# Patient Record
Sex: MALE | Race: WHITE | NOT HISPANIC OR LATINO | Employment: OTHER | URBAN - METROPOLITAN AREA
[De-identification: names, ages, dates, MRNs, and addresses within clinical notes are randomized per-mention and may not be internally consistent; named-entity substitution may affect disease eponyms.]

---

## 2018-06-07 ENCOUNTER — OFFICE VISIT (OUTPATIENT)
Dept: RHEUMATOLOGY | Facility: PHYSICIAN GROUP | Age: 73
End: 2018-06-07
Payer: COMMERCIAL

## 2018-06-07 VITALS
OXYGEN SATURATION: 95 % | WEIGHT: 192 LBS | DIASTOLIC BLOOD PRESSURE: 80 MMHG | BODY MASS INDEX: 26.78 KG/M2 | SYSTOLIC BLOOD PRESSURE: 110 MMHG | RESPIRATION RATE: 16 BRPM | HEART RATE: 60 BPM | TEMPERATURE: 98.3 F

## 2018-06-07 DIAGNOSIS — M35.00 SICCA SYNDROME (HCC): ICD-10-CM

## 2018-06-07 DIAGNOSIS — M35.00 SJOGREN'S SYNDROME, WITH UNSPECIFIED ORGAN INVOLVEMENT (HCC): ICD-10-CM

## 2018-06-07 PROCEDURE — 99204 OFFICE O/P NEW MOD 45 MIN: CPT | Performed by: INTERNAL MEDICINE

## 2018-06-07 RX ORDER — CHOLECALCIFEROL (VITAMIN D3) 25 MCG
TABLET,CHEWABLE ORAL
COMMUNITY

## 2018-06-07 RX ORDER — LOSARTAN POTASSIUM 25 MG/1
50 TABLET ORAL DAILY
COMMUNITY
End: 2020-01-27 | Stop reason: SDUPTHER

## 2018-06-07 RX ORDER — LOTEPREDNOL ETABONATE 5 MG/G
GEL OPHTHALMIC
COMMUNITY
Start: 2018-05-07 | End: 2020-05-29

## 2018-06-07 RX ORDER — ZOLPIDEM TARTRATE 10 MG/1
10 TABLET ORAL NIGHTLY PRN
COMMUNITY
End: 2020-01-27 | Stop reason: SDUPTHER

## 2018-06-07 RX ORDER — BEPOTASTINE BESILATE 15 MG/ML
1 SOLUTION/ DROPS OPHTHALMIC PRN
COMMUNITY

## 2018-06-07 RX ORDER — TEMAZEPAM 7.5 MG/1
7.5 CAPSULE ORAL
COMMUNITY
Start: 2017-05-19 | End: 2018-07-24

## 2018-06-07 RX ORDER — TADALAFIL 10 MG/1
10 TABLET ORAL EVERY MORNING
COMMUNITY
End: 2020-09-04 | Stop reason: SDUPTHER

## 2018-06-07 RX ORDER — ASPIRIN AND DIPYRIDAMOLE 25; 200 MG/1; MG/1
1 CAPSULE, EXTENDED RELEASE ORAL 2 TIMES DAILY
COMMUNITY
End: 2020-01-27

## 2018-06-07 RX ORDER — LIOTHYRONINE SODIUM 5 UG/1
5 TABLET ORAL DAILY
COMMUNITY
End: 2020-01-27 | Stop reason: SDUPTHER

## 2018-06-07 RX ORDER — EPINEPHRINE 0.3 MG/.3ML
0.3 INJECTION SUBCUTANEOUS
COMMUNITY
Start: 2017-05-16 | End: 2020-01-27

## 2018-06-07 RX ORDER — LEVOTHYROXINE SODIUM 0.12 MG/1
125 TABLET ORAL
COMMUNITY
End: 2020-01-27 | Stop reason: SDUPTHER

## 2018-06-07 ASSESSMENT — ENCOUNTER SYMPTOMS
FEVER: 0
WEIGHT LOSS: 0
CHILLS: 0
EYE PAIN: 0

## 2018-06-07 ASSESSMENT — PAIN SCALES - GENERAL: PAINLEVEL: NO PAIN
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
## 2018-06-08 ASSESSMENT — ENCOUNTER SYMPTOMS
NAUSEA: 1
VOMITING: 1
BLURRED VISION: 0
HEARTBURN: 0
BRUISES/BLEEDS EASILY: 0
CONSTIPATION: 1

## 2018-07-21 NOTE — PROGRESS NOTES
ESTABLISHED PATIENT    Mr. Martin Briceño is a 73 y.o. male here for follow-up for positive SSB with sicca symptoms, Sjogrens    Sjogren  Notes most significant symptom is fatigue  He denies any arthralgias, rash  Has ordered new OTC treatments for dry mouth      Positive anti TPO  Has a history of hypothyroidism      RHEUM HISTORY  Mr. Martin Briceño presents with a diagnosis of Sjogren.  He reports onsets of fatigue for 30-40 years.  He has had a history of anemia.  He started to notice dry eyes and dry mouth after cataract surgery in 11/2016.  At that point his symptoms brought to his attention the sicca symptoms.  He had attributed the dry mouth to medications.  He denies any parotid tenderness or rashes.  He does notes some new onset minor finger pain.     He does report chronic constipation. No fevers, chills or night sweats.        Labs dated July 13, 2018 [facility]  Vitamin D total 25 hydroxy was 42  DNA double-stranded was negative at 2  Thyroglobulin antibody was negative  Thyroid peroxidase antibody was elevated at 210  CCP antibody was 19 considered negative  CBC showed a white blood cell count of 6.4 and hemoglobin of 13.4 and a platelet count of 128  CRP was normal at 0.7 mg/L  ESR was normal at 14  SPEP showed a normal electrophoretic pattern  FLAVIO was positive with a pattern of speckled and a titer of 1: 40  Angiotensin-converting enzyme was normal at 24 U/L  G6PD was normal at 12.2 (7-20 0.5)  Calcium was 8.9 creatinine was 0.285 AST was 29 ALT 28 alkaline phosphatase was 57.      Past medical history of kidney stones, GERD triggered but food.hypothyroidism started in 2003     Family History: paternal second cousin had rheumatoid arthritis, father had osteoarthritis,  No history of lymphoma.  Father's sister had liver cancer - although she was a heavy alcohol use.       Social History: stopped alcohol in early April, originally from [place], previous [place] vet., previous  smoker       Current Outpatient Prescriptions on File Prior to Visit   Medication Sig Dispense Refill   • levothyroxine (SYNTHROID) 125 MCG Tab Take 125 mcg by mouth Every morning on an empty stomach.     • losartan (COZAAR) 25 MG Tab Take  by mouth every day.     • zolpidem (AMBIEN) 10 MG Tab Take 10 mg by mouth at bedtime as needed for Sleep.     • tadalafil (CIALIS) 10 MG tablet Take 10 mg by mouth as needed for Erectile Dysfunction.     • liothyronine (CYTOMEL) 5 MCG Tab Take 5 mcg by mouth every day.     • Bepotastine Besilate (BEPREVE) 1.5 % Solution by Ophthalmic route.     • aspirin-dipyridamole (AGGRENOX)  MG CAPSULE SR 12 HR Take 1 Cap by mouth 2 times a day.     • Cyanocobalamin (B-12) 1000 MCG Cap Take  by mouth.     • cholecalciferol (D-3-5) 5000 UNIT Cap Take 5,000 Units by mouth every day.     • Coenzyme Q10 (COQ10) 400 MG Cap Take  by mouth.     • EPINEPHrine (EPIPEN) 0.3 MG/0.3ML Solution Auto-injector solution for injection 0.3 mg by Intramuscular route.     • LIPITOR 20 MG PO TABS 30 mg. 1 po daily     • PRILOSEC 20 MG PO CPDR 1 po daily     • CENTRUM PO TABS 1 po daily     • LOTEMAX 0.5 % Gel        No current facility-administered medications on file prior to visit.        REVIEW OF SYSTEMS  Review of Systems   Constitutional: Positive for malaise/fatigue. Negative for chills and fever.   Eyes: Negative for pain.        Notes dry mouth.  Relates dry eyes to cataracts   Respiratory: Negative for shortness of breath.    Cardiovascular: Negative for chest pain.   Musculoskeletal: Negative for joint pain.   Skin: Negative for rash.   Neurological: Negative for dizziness and headaches.       PHYSICAL EXAM  Ambulatory Vitals   Ambulatory Vitals  Encounter Vitals  Temperature: 36.8 °C (98.3 °F)  Temp Source: Temporal  Blood Pressure : 122/70  BP Location: Right, Upper Arm  Patient BP Position: Sitting  Pulse: (!) 50  Respiration: 16  Pulse Oximetry: 94 %  Weight: 84.5 kg (186 lb 4.8 oz)  Weight  Source: Stand Up Scale      Physical Exam   Constitutional: He is oriented to person, place, and time and well-developed, well-nourished, and in no distress. No distress.   HENT:   Head: Normocephalic and atraumatic.   Right Ear: External ear normal.   Left Ear: External ear normal.   Eyes: Conjunctivae are normal. Right eye exhibits no discharge. Left eye exhibits no discharge. No scleral icterus.   Cardiovascular: Normal heart sounds.    No murmur heard.  Pulmonary/Chest: No stridor. No respiratory distress.   Musculoskeletal: He exhibits no edema.   No overt synovitis of the MCP, PIP, DIP   Lymphadenopathy:     He has no cervical adenopathy.   Neurological: He is alert and oriented to person, place, and time. Gait normal.   Skin: Skin is warm and dry. He is not diaphoretic.   Psychiatric: Mood, memory, affect and judgment normal.           DIAGNOSTICS:    Labs    Noted above      Imaging          ASSESSMENT AND PLAN:  Mr. Martin Briceño presents for follow-up of SSB positive sjogren    SJogren; SSB and positive FLAVIO and sicca symptoms  Notes increase fatigue  Discussed starting plaquenil  I will get labs done in 8 weeks  Discussed that medications can take up to 6 months to take effect    I reviewed the side effects of hydroxychloroquine including but not limited to headache, retinal toxicity, skin rash.  I emphasized the importance of baseline and annual follow-up with evaluation with ophthalmology.  Patient reports he will have eyes check    Positive anti TPO antibodies  On levothyroxine  Refer to endocrinology          1. Anti-TPO antibodies present  REFERRAL TO ENDOCRINOLOGY    CBC WITH DIFFERENTIAL    COMP METABOLIC PANEL    HEP B SURFACE ANTIGEN    HEP C VIRUS ANTIBODY    HEP B CORE AB TOTAL   2. Sjogren's syndrome, with unspecified organ involvement (HCC)  CBC WITH DIFFERENTIAL    COMP METABOLIC PANEL    HEP B SURFACE ANTIGEN    HEP C VIRUS ANTIBODY    HEP B CORE AB TOTAL   3. Positive FLAVIO (antinuclear  antibody)  CBC WITH DIFFERENTIAL    COMP METABOLIC PANEL    HEP B SURFACE ANTIGEN    HEP C VIRUS ANTIBODY    HEP B CORE AB TOTAL     Return in about 8 weeks (around 9/18/2018).      I have spent greater than 50% of this 30 minute visit in counseling/coordination of care with the patient regarding review of labs and the benefits and risks of plaquenil.      he agreed and verbalized he agreement and understanding with the current plan. I answered all questions and concerns he has at this time and advised our patient to call at any time in there interim with questions or concerns in regards he care.     Thank you for allowing me to participate in the care of this patient, I will continue to follow closely.      Please note that this dictation was created using voice recognition software. I have made every reasonable attempt to correct obvious errors, but I expect that there are errors of grammar and possibly content that I did not discover before finalizing the note.

## 2018-07-24 ENCOUNTER — OFFICE VISIT (OUTPATIENT)
Dept: RHEUMATOLOGY | Facility: PHYSICIAN GROUP | Age: 73
End: 2018-07-24
Payer: COMMERCIAL

## 2018-07-24 VITALS
OXYGEN SATURATION: 94 % | DIASTOLIC BLOOD PRESSURE: 70 MMHG | HEART RATE: 50 BPM | WEIGHT: 186.3 LBS | RESPIRATION RATE: 16 BRPM | SYSTOLIC BLOOD PRESSURE: 122 MMHG | TEMPERATURE: 98.3 F

## 2018-07-24 DIAGNOSIS — M35.00 SJOGREN'S SYNDROME, WITH UNSPECIFIED ORGAN INVOLVEMENT (HCC): ICD-10-CM

## 2018-07-24 DIAGNOSIS — R76.8 ANTI-TPO ANTIBODIES PRESENT: ICD-10-CM

## 2018-07-24 DIAGNOSIS — R76.8 POSITIVE ANA (ANTINUCLEAR ANTIBODY): ICD-10-CM

## 2018-07-24 PROCEDURE — 99214 OFFICE O/P EST MOD 30 MIN: CPT | Performed by: INTERNAL MEDICINE

## 2018-07-24 RX ORDER — HYDROXYCHLOROQUINE SULFATE 200 MG/1
TABLET, FILM COATED ORAL
Qty: 60 TAB | Refills: 1 | Status: SHIPPED | OUTPATIENT
Start: 2018-07-24 | End: 2018-10-02 | Stop reason: SDUPTHER

## 2018-07-24 ASSESSMENT — ENCOUNTER SYMPTOMS
DIZZINESS: 0
FEVER: 0
HEADACHES: 0
SHORTNESS OF BREATH: 0
CHILLS: 0
EYE PAIN: 0

## 2018-07-24 NOTE — LETTER
[facility]   [address], [address]  [place] [address]  Phone: [phone]  Fax: [phone]              Encounter Date: 7/24/2018    Dear Dr. Hackett,    It was a pleasure seeing your patient, Martin Briceño, on 7/24/2018. Diagnoses of Anti-TPO antibodies present, Sjogren's syndrome, with unspecified organ involvement (HCC), and Positive FLAVIO (antinuclear antibody) were pertinent to this visit.     Please find attached progress note which includes the history I obtained from Mr. Briceño, my physical examination findings, my impression and recommendations.      Once again, it was a pleasure participating in your patient's care.  Please feel free to contact me if you have any questions or if I can be of any further assistance to your patients.      Sincerely,    Karely Dunbar M.D.  Electronically Signed          PROGRESS NOTE:  ESTABLISHED PATIENT    Mr. Martin Briceño is a 73 y.o. male here for follow-up for positive SSB with sicca symptoms, Sjogrens    Sjogren  Notes most significant symptom is fatigue  He denies any arthralgias, rash  Has ordered new OTC treatments for dry mouth      Positive anti TPO  Has a history of hypothyroidism      RHEUM HISTORY  Mr. Martin Briceño presents with a diagnosis of Sjogren.  He reports onsets of fatigue for 30-40 years.  He has had a history of anemia.  He started to notice dry eyes and dry mouth after cataract surgery in 11/2016.  At that point his symptoms brought to his attention the sicca symptoms.  He had attributed the dry mouth to medications.  He denies any parotid tenderness or rashes.  He does notes some new onset minor finger pain.     He does report chronic constipation. No fevers, chills or night sweats.        Labs dated July 13, 2018 from [facility]  Vitamin D total 25 hydroxy was 42  DNA double-stranded was negative at 2  Thyroglobulin antibody was negative  Thyroid peroxidase antibody was elevated at 210  CCP antibody  was 19 considered negative  CBC showed a white blood cell count of 6.4 and hemoglobin of 13.4 and a platelet count of 128  CRP was normal at 0.7 mg/L  ESR was normal at 14  SPEP showed a normal electrophoretic pattern  FLAVIO was positive with a pattern of speckled and a titer of 1: 40  Angiotensin-converting enzyme was normal at 24 U/L  G6PD was normal at 12.2 (7-20 0.5)  Calcium was 8.9 creatinine was 0.285 AST was 29 ALT 28 alkaline phosphatase was 57.      Past medical history of kidney stones, GERD triggered but food.hypothyroidism started in 2003     Family History: paternal second cousin had rheumatoid arthritis, father had osteoarthritis,  No history of lymphoma.  Father's sister had liver cancer - although she was a heavy alcohol use.       Social History: stopped alcohol in early April, originally from [place], previous [place] vet., previous smoker       Current Outpatient Prescriptions on File Prior to Visit   Medication Sig Dispense Refill   • levothyroxine (SYNTHROID) 125 MCG Tab Take 125 mcg by mouth Every morning on an empty stomach.     • losartan (COZAAR) 25 MG Tab Take  by mouth every day.     • zolpidem (AMBIEN) 10 MG Tab Take 10 mg by mouth at bedtime as needed for Sleep.     • tadalafil (CIALIS) 10 MG tablet Take 10 mg by mouth as needed for Erectile Dysfunction.     • liothyronine (CYTOMEL) 5 MCG Tab Take 5 mcg by mouth every day.     • Bepotastine Besilate (BEPREVE) 1.5 % Solution by Ophthalmic route.     • aspirin-dipyridamole (AGGRENOX)  MG CAPSULE SR 12 HR Take 1 Cap by mouth 2 times a day.     • Cyanocobalamin (B-12) 1000 MCG Cap Take  by mouth.     • cholecalciferol (D-3-5) 5000 UNIT Cap Take 5,000 Units by mouth every day.     • Coenzyme Q10 (COQ10) 400 MG Cap Take  by mouth.     • EPINEPHrine (EPIPEN) 0.3 MG/0.3ML Solution Auto-injector solution for injection 0.3 mg by Intramuscular route.     • LIPITOR 20 MG PO TABS 30 mg. 1 po daily     • PRILOSEC 20 MG PO CPDR 1 po daily     •  CENTRUM PO TABS 1 po daily     • LOTEMAX 0.5 % Gel        No current facility-administered medications on file prior to visit.        REVIEW OF SYSTEMS  Review of Systems   Constitutional: Positive for malaise/fatigue. Negative for chills and fever.   Eyes: Negative for pain.        Notes dry mouth.  Relates dry eyes to cataracts   Respiratory: Negative for shortness of breath.    Cardiovascular: Negative for chest pain.   Musculoskeletal: Negative for joint pain.   Skin: Negative for rash.   Neurological: Negative for dizziness and headaches.       PHYSICAL EXAM  Ambulatory Vitals   Ambulatory Vitals  Encounter Vitals  Temperature: 36.8 °C (98.3 °F)  Temp Source: Temporal  Blood Pressure : 122/70  BP Location: Right, Upper Arm  Patient BP Position: Sitting  Pulse: (!) 50  Respiration: 16  Pulse Oximetry: 94 %  Weight: 84.5 kg (186 lb 4.8 oz)  Weight Source: Stand Up Scale      Physical Exam   Constitutional: He is oriented to person, place, and time and well-developed, well-nourished, and in no distress. No distress.   HENT:   Head: Normocephalic and atraumatic.   Right Ear: External ear normal.   Left Ear: External ear normal.   Eyes: Conjunctivae are normal. Right eye exhibits no discharge. Left eye exhibits no discharge. No scleral icterus.   Cardiovascular: Normal heart sounds.    No murmur heard.  Pulmonary/Chest: No stridor. No respiratory distress.   Musculoskeletal: He exhibits no edema.   No overt synovitis of the MCP, PIP, DIP   Lymphadenopathy:     He has no cervical adenopathy.   Neurological: He is alert and oriented to person, place, and time. Gait normal.   Skin: Skin is warm and dry. He is not diaphoretic.   Psychiatric: Mood, memory, affect and judgment normal.           DIAGNOSTICS:    Labs    Noted above      Imaging          ASSESSMENT AND PLAN:  Mr. Martin Briceño presents for follow-up of SSB positive sjogren    SJogren; SSB and positive FLAVIO and sicca symptoms  Notes increase  fatigue  Discussed starting plaquenil  I will get labs done in 8 weeks  Discussed that medications can take up to 6 months to take effect    I reviewed the side effects of hydroxychloroquine including but not limited to headache, retinal toxicity, skin rash.  I emphasized the importance of baseline and annual follow-up with evaluation with ophthalmology.  Patient reports he will have eyes check    Positive anti TPO antibodies  On levothyroxine  Refer to endocrinology          1. Anti-TPO antibodies present  REFERRAL TO ENDOCRINOLOGY    CBC WITH DIFFERENTIAL    COMP METABOLIC PANEL    HEP B SURFACE ANTIGEN    HEP C VIRUS ANTIBODY    HEP B CORE AB TOTAL   2. Sjogren's syndrome, with unspecified organ involvement (HCC)  CBC WITH DIFFERENTIAL    COMP METABOLIC PANEL    HEP B SURFACE ANTIGEN    HEP C VIRUS ANTIBODY    HEP B CORE AB TOTAL   3. Positive FLAVIO (antinuclear antibody)  CBC WITH DIFFERENTIAL    COMP METABOLIC PANEL    HEP B SURFACE ANTIGEN    HEP C VIRUS ANTIBODY    HEP B CORE AB TOTAL     Return in about 8 weeks (around 9/18/2018).      I have spent greater than 50% of this 30 minute visit in counseling/coordination of care with the patient regarding review of labs and the benefits and risks of plaquenil.      he agreed and verbalized he agreement and understanding with the current plan. I answered all questions and concerns he has at this time and advised our patient to call at any time in there interim with questions or concerns in regards he care.     Thank you for allowing me to participate in the care of this patient, I will continue to follow closely.      Please note that this dictation was created using voice recognition software. I have made every reasonable attempt to correct obvious errors, but I expect that there are errors of grammar and possibly content that I did not discover before finalizing the note.

## 2018-09-10 ENCOUNTER — TELEPHONE (OUTPATIENT)
Dept: RHEUMATOLOGY | Facility: PHYSICIAN GROUP | Age: 73
End: 2018-09-10

## 2018-09-10 DIAGNOSIS — D69.6 THROMBOCYTOPENIA (HCC): ICD-10-CM

## 2018-09-10 NOTE — TELEPHONE ENCOUNTER
No need to call Sudha Mckeon  We did find the results      Please call patient.  His platelet count and red blood cell count did decrease.  I would like to decrease his plaquenil dose to 200 mg and 400 mg alternating days and repeat the CBC at the last week of September.    CBC order placed.

## 2018-09-10 NOTE — TELEPHONE ENCOUNTER
Can you please call [facility]  [phone]    I am missing the hepatitis B labs (surface antigen and core antibody) and CBC that was ordered 7/24/2018      CMP showed a calcium level of 8.7 albumin level of 4.1 and a creatinine of 0.96 and AST 26 ALT of 20 and the hepatitis C antibody was nonreactive.

## 2018-09-20 ENCOUNTER — OFFICE VISIT (OUTPATIENT)
Dept: RHEUMATOLOGY | Facility: MEDICAL CENTER | Age: 73
End: 2018-09-20
Payer: COMMERCIAL

## 2018-09-20 VITALS
SYSTOLIC BLOOD PRESSURE: 100 MMHG | OXYGEN SATURATION: 96 % | HEART RATE: 61 BPM | WEIGHT: 182 LBS | RESPIRATION RATE: 16 BRPM | DIASTOLIC BLOOD PRESSURE: 60 MMHG

## 2018-09-20 DIAGNOSIS — Z79.899 ENCOUNTER FOR MONITORING OF HYDROXYCHLOROQUINE THERAPY: ICD-10-CM

## 2018-09-20 DIAGNOSIS — M35.00 SJOGREN'S SYNDROME, WITH UNSPECIFIED ORGAN INVOLVEMENT (HCC): ICD-10-CM

## 2018-09-20 DIAGNOSIS — Z51.81 ENCOUNTER FOR MONITORING OF HYDROXYCHLOROQUINE THERAPY: ICD-10-CM

## 2018-09-20 PROCEDURE — 99213 OFFICE O/P EST LOW 20 MIN: CPT | Performed by: INTERNAL MEDICINE

## 2018-09-20 ASSESSMENT — ENCOUNTER SYMPTOMS
DIZZINESS: 0
EYE PAIN: 0
FEVER: 0
HEADACHES: 0
CHILLS: 0
SHORTNESS OF BREATH: 0

## 2018-09-20 NOTE — PROGRESS NOTES
ESTABLISHED PATIENT    Mr. Martin Briceño is a 73 y.o. male here for follow-up for positive SSB with sicca symptoms, Sjogrens    Sjogren  We had started 7/24/2018 plaquenil 400 mg daily and the first three weeks he had severe fatigue.  He also notes some improvement in his fatigue compared to earlier last month.  Since the reduction he is feeling improved.  He reports no arthralgias. THe last four days his fatigue is improved.    Current Dose  Plaquenil 200 mg and 400 mg q other day    Positive anti TPO  Has a history of hypothyroidism      RHEUM HISTORY  Mr. Martin Briceño presents with a diagnosis of Sjogren.  He reports onsets of fatigue for 30-40 years.  He has had a history of anemia.  He started to notice dry eyes and dry mouth after cataract surgery in 11/2016.  At that point his symptoms brought to his attention the sicca symptoms.  He had attributed the dry mouth to medications.  He denies any parotid tenderness or rashes.  He does notes some new onset minor finger pain.     He does report chronic constipation. No fevers, chills or night sweats.        Labs dated July 13, 2018 from [facility]  Vitamin D total 25 hydroxy was 42  DNA double-stranded was negative at 2  Thyroglobulin antibody was negative  Thyroid peroxidase antibody was elevated at 210  CCP antibody was 19 considered negative  CBC showed a white blood cell count of 6.4 and hemoglobin of 13.4 and a platelet count of 128  CRP was normal at 0.7 mg/L  ESR was normal at 14  SPEP showed a normal electrophoretic pattern  FLAVIO was positive with a pattern of speckled and a titer of 1: 40  Angiotensin-converting enzyme was normal at 24 U/L  G6PD was normal at 12.2 (7-20 0.5)  Calcium was 8.9 creatinine was 0.285 AST was 29 ALT 28 alkaline phosphatase was 57.      Labs dated 9/3/2018  AST = 26  ALT = 20  HCV nonreactive  HBV sAg nonreactive  HBV core antibody nonreactive  CBC: 4.5/13.4/113      Past medical history of kidney stones, GERD  triggered by food, hypothyroidism started in 2003, history of parathyroidectomy     Family History: paternal second cousin had rheumatoid arthritis, father had osteoarthritis,  No history of lymphoma.  Father's sister had liver cancer - although she was a heavy alcohol use.       Social History: stopped alcohol in early April, originally from [place], previous [place] vet., previous smoker       Current Outpatient Prescriptions on File Prior to Visit   Medication Sig Dispense Refill   • hydroxychloroquine (PLAQUENIL) 200 MG Tab 200 mg q day x 7 days then increase to 200 mg BID po labs in 8 weeks 60 Tab 1   • levothyroxine (SYNTHROID) 125 MCG Tab Take 125 mcg by mouth Every morning on an empty stomach.     • losartan (COZAAR) 25 MG Tab Take  by mouth every day.     • zolpidem (AMBIEN) 10 MG Tab Take 10 mg by mouth at bedtime as needed for Sleep.     • tadalafil (CIALIS) 10 MG tablet Take 10 mg by mouth as needed for Erectile Dysfunction.     • liothyronine (CYTOMEL) 5 MCG Tab Take 5 mcg by mouth every day.     • Bepotastine Besilate (BEPREVE) 1.5 % Solution by Ophthalmic route.     • aspirin-dipyridamole (AGGRENOX)  MG CAPSULE SR 12 HR Take 1 Cap by mouth 2 times a day.     • Cyanocobalamin (B-12) 1000 MCG Cap Take  by mouth.     • cholecalciferol (D-3-5) 5000 UNIT Cap Take 5,000 Units by mouth every day.     • Coenzyme Q10 (COQ10) 400 MG Cap Take  by mouth.     • LOTEMAX 0.5 % Gel      • EPINEPHrine (EPIPEN) 0.3 MG/0.3ML Solution Auto-injector solution for injection 0.3 mg by Intramuscular route.     • LIPITOR 20 MG PO TABS 30 mg. 1 po daily     • PRILOSEC 20 MG PO CPDR 1 po daily     • CENTRUM PO TABS 1 po daily       No current facility-administered medications on file prior to visit.        REVIEW OF SYSTEMS  Review of Systems   Constitutional: Positive for malaise/fatigue. Negative for chills and fever.   Eyes: Negative for pain.        Notes dry mouth.  Relates dry eyes to cataracts   Respiratory:  Negative for shortness of breath.    Cardiovascular: Negative for chest pain.   Gastrointestinal: Positive for nausea.   Musculoskeletal: Negative for joint pain.   Skin: Negative for rash.   Neurological: Negative for dizziness and headaches.       PHYSICAL EXAM  Ambulatory Vitals  Vitals:    09/20/18 1300   BP: 100/60   BP Location: Left arm   Patient Position: Sitting   BP Cuff Size: Adult   Pulse: 61   Resp: 16   SpO2: 96%   Weight: 82.6 kg (182 lb)         Physical Exam   Constitutional: He is oriented to person, place, and time and well-developed, well-nourished, and in no distress. No distress.   HENT:   Head: Normocephalic and atraumatic.   Right Ear: External ear normal.   Left Ear: External ear normal.   Eyes: Conjunctivae are normal. Right eye exhibits no discharge. Left eye exhibits no discharge. No scleral icterus.   Cardiovascular: Normal heart sounds.    No murmur heard.  Pulmonary/Chest: No stridor. No respiratory distress.   Musculoskeletal: He exhibits no edema.   No overt synovitis of the MCP, PIP, DIP   Lymphadenopathy:     He has no cervical adenopathy.   Neurological: He is alert and oriented to person, place, and time. Gait normal.   Skin: Skin is warm and dry. He is not diaphoretic.   Psychiatric: Mood, memory, affect and judgment normal.           DIAGNOSTICS:    Labs    Noted above      Imaging          ASSESSMENT AND PLAN:  Mr. Martin Briceño presents for follow-up of SSB positive sjogren    SJogren; SSB and positive FLAVIO and sicca symptoms  Started plaquenil at 400 mg daily and now 200mg and 400 mg q other day with improvement in symptoms  Repeat labs showed slight drop in WBC and so will repeat end for September  Discussed that medications can take up to 6 months to take effect    I reviewed the side effects of hydroxychloroquine including but not limited to headache, retinal toxicity, skin rash.  I emphasized the importance of baseline and annual follow-up with evaluation with  ophthalmology.  Patient reports he will have eyes check    Positive anti TPO antibodies  On levothyroxine  Has an upcoming appointment with endocrinology          1. Sjogren's syndrome, with unspecified organ involvement (HCC)     2. Encounter for monitoring of hydroxychloroquine therapy       Return in about 4 months (around 1/20/2019).        he agreed and verbalized he agreement and understanding with the current plan. I answered all questions and concerns he has at this time and advised our patient to call at any time in there interim with questions or concerns in regards he care.     Thank you for allowing me to participate in the care of this patient, I will continue to follow closely.      Please note that this dictation was created using voice recognition software. I have made every reasonable attempt to correct obvious errors, but I expect that there are errors of grammar and possibly content that I did not discover before finalizing the note.

## 2018-09-20 NOTE — LETTER
[facility]   [address] [place] [address], [address]  [place] [address]  Phone: [phone]  Fax: [phone]              Encounter Date: 9/20/2018    Dear Dr. Diehl,    It was a pleasure seeing your patient, Martin Briceño, on 9/20/2018. Diagnoses of Sjogren's syndrome, with unspecified organ involvement (HCC) and Encounter for monitoring of hydroxychloroquine therapy were pertinent to this visit.     Please find attached progress note which includes the history I obtained from Mr. Briceño, my physical examination findings, my impression and recommendations.      Once again, it was a pleasure participating in your patient's care.  Please feel free to contact me if you have any questions or if I can be of any further assistance to your patients.      Sincerely,    Karely Dunbar M.D.  Electronically Signed          PROGRESS NOTE:  ESTABLISHED PATIENT    Mr. Martin Brcieño is a 73 y.o. male here for follow-up for positive SSB with sicca symptoms, Sjogrens    Sjogren  We had started 7/24/2018 plaquenil 400 mg daily and the first three weeks he had severe fatigue.  He also notes some improvement in his fatigue compared to earlier last month.  Since the reduction he is feeling improved.  He reports no arthralgias. THe last four days his fatigue is improved.    Current Dose  Plaquenil 200 mg and 400 mg q other day    Positive anti TPO  Has a history of hypothyroidism      RHEUM HISTORY  Mr. Martin Briceño presents with a diagnosis of Sjogren.  He reports onsets of fatigue for 30-40 years.  He has had a history of anemia.  He started to notice dry eyes and dry mouth after cataract surgery in 11/2016.  At that point his symptoms brought to his attention the sicca symptoms.  He had attributed the dry mouth to medications.  He denies any parotid tenderness or rashes.  He does notes some new onset minor finger pain.     He does report chronic constipation. No fevers, chills or night sweats.        Labs dated  July 13, 2018 from [facility]  Vitamin D total 25 hydroxy was 42  DNA double-stranded was negative at 2  Thyroglobulin antibody was negative  Thyroid peroxidase antibody was elevated at 210  CCP antibody was 19 considered negative  CBC showed a white blood cell count of 6.4 and hemoglobin of 13.4 and a platelet count of 128  CRP was normal at 0.7 mg/L  ESR was normal at 14  SPEP showed a normal electrophoretic pattern  FLAVIO was positive with a pattern of speckled and a titer of 1: 40  Angiotensin-converting enzyme was normal at 24 U/L  G6PD was normal at 12.2 (7-20 0.5)  Calcium was 8.9 creatinine was 0.285 AST was 29 ALT 28 alkaline phosphatase was 57.      Labs dated 9/3/2018  AST = 26  ALT = 20  HCV nonreactive  HBV sAg nonreactive  HBV core antibody nonreactive  CBC: 4.5/13.4/113      Past medical history of kidney stones, GERD triggered by food, hypothyroidism started in 2003, history of parathyroidectomy     Family History: paternal second cousin had rheumatoid arthritis, father had osteoarthritis,  No history of lymphoma.  Father's sister had liver cancer - although she was a heavy alcohol use.       Social History: stopped alcohol in early April, originally from [place], previous [place] vet., previous smoker       Current Outpatient Prescriptions on File Prior to Visit   Medication Sig Dispense Refill   • hydroxychloroquine (PLAQUENIL) 200 MG Tab 200 mg q day x 7 days then increase to 200 mg BID po labs in 8 weeks 60 Tab 1   • levothyroxine (SYNTHROID) 125 MCG Tab Take 125 mcg by mouth Every morning on an empty stomach.     • losartan (COZAAR) 25 MG Tab Take  by mouth every day.     • zolpidem (AMBIEN) 10 MG Tab Take 10 mg by mouth at bedtime as needed for Sleep.     • tadalafil (CIALIS) 10 MG tablet Take 10 mg by mouth as needed for Erectile Dysfunction.     • liothyronine (CYTOMEL) 5 MCG Tab Take 5 mcg by mouth every day.     • Bepotastine Besilate (BEPREVE) 1.5 % Solution by Ophthalmic  route.     • aspirin-dipyridamole (AGGRENOX)  MG CAPSULE SR 12 HR Take 1 Cap by mouth 2 times a day.     • Cyanocobalamin (B-12) 1000 MCG Cap Take  by mouth.     • cholecalciferol (D-3-5) 5000 UNIT Cap Take 5,000 Units by mouth every day.     • Coenzyme Q10 (COQ10) 400 MG Cap Take  by mouth.     • LOTEMAX 0.5 % Gel      • EPINEPHrine (EPIPEN) 0.3 MG/0.3ML Solution Auto-injector solution for injection 0.3 mg by Intramuscular route.     • LIPITOR 20 MG PO TABS 30 mg. 1 po daily     • PRILOSEC 20 MG PO CPDR 1 po daily     • CENTRUM PO TABS 1 po daily       No current facility-administered medications on file prior to visit.        REVIEW OF SYSTEMS  Review of Systems   Constitutional: Positive for malaise/fatigue. Negative for chills and fever.   Eyes: Negative for pain.        Notes dry mouth.  Relates dry eyes to cataracts   Respiratory: Negative for shortness of breath.    Cardiovascular: Negative for chest pain.   Gastrointestinal: Positive for nausea.   Musculoskeletal: Negative for joint pain.   Skin: Negative for rash.   Neurological: Negative for dizziness and headaches.       PHYSICAL EXAM  Ambulatory Vitals  Vitals:    09/20/18 1300   BP: 100/60   BP Location: Left arm   Patient Position: Sitting   BP Cuff Size: Adult   Pulse: 61   Resp: 16   SpO2: 96%   Weight: 82.6 kg (182 lb)         Physical Exam   Constitutional: He is oriented to person, place, and time and well-developed, well-nourished, and in no distress. No distress.   HENT:   Head: Normocephalic and atraumatic.   Right Ear: External ear normal.   Left Ear: External ear normal.   Eyes: Conjunctivae are normal. Right eye exhibits no discharge. Left eye exhibits no discharge. No scleral icterus.   Cardiovascular: Normal heart sounds.    No murmur heard.  Pulmonary/Chest: No stridor. No respiratory distress.   Musculoskeletal: He exhibits no edema.   No overt synovitis of the MCP, PIP, DIP   Lymphadenopathy:     He has no cervical adenopathy.      Neurological: He is alert and oriented to person, place, and time. Gait normal.   Skin: Skin is warm and dry. He is not diaphoretic.   Psychiatric: Mood, memory, affect and judgment normal.           DIAGNOSTICS:    Labs    Noted above      Imaging          ASSESSMENT AND PLAN:  Mr. Martin Briceño presents for follow-up of SSB positive sjogren    SJogren; SSB and positive FLAVIO and sicca symptoms  Started plaquenil at 400 mg daily and now 200mg and 400 mg q other day with improvement in symptoms  Repeat labs showed slight drop in WBC and so will repeat end for September  Discussed that medications can take up to 6 months to take effect    I reviewed the side effects of hydroxychloroquine including but not limited to headache, retinal toxicity, skin rash.  I emphasized the importance of baseline and annual follow-up with evaluation with ophthalmology.  Patient reports he will have eyes check    Positive anti TPO antibodies  On levothyroxine  Has an upcoming appointment with endocrinology          1. Sjogren's syndrome, with unspecified organ involvement (HCC)     2. Encounter for monitoring of hydroxychloroquine therapy       Return in about 4 months (around 1/20/2019).        he agreed and verbalized he agreement and understanding with the current plan. I answered all questions and concerns he has at this time and advised our patient to call at any time in there interim with questions or concerns in regards he care.     Thank you for allowing me to participate in the care of this patient, I will continue to follow closely.      Please note that this dictation was created using voice recognition software. I have made every reasonable attempt to correct obvious errors, but I expect that there are errors of grammar and possibly content that I did not discover before finalizing the note.

## 2018-09-21 PROBLEM — M35.00 SJOGREN'S SYNDROME (HCC): Status: ACTIVE | Noted: 2018-09-21

## 2018-09-21 ASSESSMENT — ENCOUNTER SYMPTOMS: NAUSEA: 1

## 2018-09-28 ENCOUNTER — PATIENT MESSAGE (OUTPATIENT)
Dept: RHEUMATOLOGY | Facility: MEDICAL CENTER | Age: 73
End: 2018-09-28

## 2018-09-28 DIAGNOSIS — Z79.899 LONG-TERM USE OF HYDROXYCHLOROQUINE: ICD-10-CM

## 2018-10-29 ENCOUNTER — TELEPHONE (OUTPATIENT)
Dept: RHEUMATOLOGY | Facility: MEDICAL CENTER | Age: 73
End: 2018-10-29

## 2018-10-30 NOTE — TELEPHONE ENCOUNTER
Since I have never seen this patient, I would need to see the patient before considering a referral, may be he can ask his PCP    Would also help if he knew which endocrinologist he wanted to go to

## 2018-11-05 ENCOUNTER — OFFICE VISIT (OUTPATIENT)
Dept: RHEUMATOLOGY | Facility: MEDICAL CENTER | Age: 73
End: 2018-11-05
Payer: COMMERCIAL

## 2018-11-05 VITALS
SYSTOLIC BLOOD PRESSURE: 110 MMHG | RESPIRATION RATE: 14 BRPM | WEIGHT: 177 LBS | HEART RATE: 60 BPM | DIASTOLIC BLOOD PRESSURE: 60 MMHG | OXYGEN SATURATION: 97 % | TEMPERATURE: 97.9 F

## 2018-11-05 DIAGNOSIS — E06.9 THYROIDITIS: ICD-10-CM

## 2018-11-05 DIAGNOSIS — E21.3 HYPERPARATHYROIDISM (HCC): ICD-10-CM

## 2018-11-05 DIAGNOSIS — E03.9 HYPOTHYROIDISM, UNSPECIFIED TYPE: ICD-10-CM

## 2018-11-05 DIAGNOSIS — R53.83 FATIGUE, UNSPECIFIED TYPE: ICD-10-CM

## 2018-11-05 DIAGNOSIS — M35.00 SJOGREN'S SYNDROME, WITH UNSPECIFIED ORGAN INVOLVEMENT (HCC): ICD-10-CM

## 2018-11-05 PROCEDURE — 99214 OFFICE O/P EST MOD 30 MIN: CPT | Performed by: INTERNAL MEDICINE

## 2018-11-05 RX ORDER — FLUTICASONE PROPIONATE 50 MCG
1 SPRAY, SUSPENSION (ML) NASAL PRN
COMMUNITY

## 2018-11-05 NOTE — LETTER
[facility]   [address] [place] [address], [address]  [place] [address]  Phone: [phone]  Fax: [phone]              Encounter Date: 11/5/2018    Dear Dr. Rucker ref. provider found,    It was a pleasure seeing your patient, Martin Briceño, on 11/5/2018. Diagnoses of Sjogren's syndrome, with unspecified organ involvement (HCC), Hypothyroidism, unspecified type, Hyperparathyroidism (HCC), Thyroiditis, and Fatigue, unspecified type were pertinent to this visit.     Please find attached progress note which includes the history I obtained from Mr. Briceño, my physical examination findings, my impression and recommendations.      Once again, it was a pleasure participating in your patient's care.  Please feel free to contact me if you have any questions or if I can be of any further assistance to your patients.      Sincerely,    Yuki Rodríguez M.D.  Electronically Signed          PROGRESS NOTE:  No notes on file

## 2018-11-05 NOTE — PROGRESS NOTES
Chief Complaint- joint pain    Subjective:   Martin Briceño is a 73 y.o. male here today for follow up of rheumatological issues    This is a follow-up visit for this patient who is seen in this clinic for Sjogren's syndrome previously followed by Dr. Dunbar now a brand-new patient to me, patient here stating that he was told he needs a referral to endocrinology for what per notes sounds like a positive anti-TPO antibody.  However patient has already been diagnosed as hypothyroidism and is currently being treated with levothyroxine.  Patient does have a diagnosis of Sjogren's but there is been no minor salivary gland lip biopsy done, patient is symptomatic with dry eyes dry mouth however patient has good dentition, patient's main issue is fatigue and chronic constipation.    Additional comorbidities include a history of hyperparathyroidism status post surgical resection of 3 out of the 4 parathyroids and is followed by his PCP.  Patient has a long history of thrombocytopenia followed by his hematologist.    G6PD 12.2 adequate per Dr Pemberton notes 9/20/2018  HBsAg/HbcAb neg 9/2018 [facility]  HCV neg 9/2018 [facility]  FLAVIO neg 8/2017 [facility]  RF neg 8/2017  [facility]  SSA neg 8/2017  [facility]  SSB 36.6 8/2017  [facility]  SP-1 neg 8/2017  [facility]    Current medicines (including changes today)  Current Outpatient Prescriptions   Medication Sig Dispense Refill   • fluticasone (FLONASE) 50 MCG/ACT nasal spray Spray 1 Spray in nose every day.     • hydroxychloroquine (PLAQUENIL) 200 MG Tab Alternate 200mg and 400 mg daily 45 Tab 5   • levothyroxine (SYNTHROID) 125 MCG Tab Take 125 mcg by mouth Every morning on an empty stomach.     • losartan (COZAAR) 25 MG Tab Take 50 mg by mouth every day.     • zolpidem (AMBIEN) 10 MG Tab Take 10 mg by mouth at bedtime as needed for Sleep.     • tadalafil (CIALIS) 10 MG tablet Take 10 mg by mouth as  needed for Erectile Dysfunction.     • liothyronine (CYTOMEL) 5 MCG Tab Take 5 mcg by mouth every day.     • Bepotastine Besilate (BEPREVE) 1.5 % Solution by Ophthalmic route.     • aspirin-dipyridamole (AGGRENOX)  MG CAPSULE SR 12 HR Take 1 Cap by mouth 2 times a day.     • Cyanocobalamin (B-12) 1000 MCG Cap Take  by mouth.     • cholecalciferol (D-3-5) 5000 UNIT Cap Take 5,000 Units by mouth every day.     • Coenzyme Q10 (COQ10) 400 MG Cap Take  by mouth.     • LOTEMAX 0.5 % Gel      • EPINEPHrine (EPIPEN) 0.3 MG/0.3ML Solution Auto-injector solution for injection 0.3 mg by Intramuscular route.     • LIPITOR 20 MG PO TABS 30 mg. 1 po daily     • PRILOSEC 20 MG PO CPDR 1 po daily     • CENTRUM PO TABS 1 po daily       No current facility-administered medications for this visit.      He  has a past medical history of Heart Burn; Indigestion; and Unspecified Disorder of Thyroid.    ROS   Other than what is mentioned in HPI or physical exam, there is no history of headaches, double vision or blurred vision. No temporal tenderness or jaw claudication. No history of cataracts or glaucoma. No trouble swallowing difficulties or sore throats.  No chest complaints including chest pain, cough or sputum production. No GI complaints including nausea, vomiting, change in bowel habits, or past peptic ulcer disease. No history of blood in the stools. No urinary complaints including dysuria or frequency. No history of alopecia, photosensitivity, oral ulcerations, Raynaud's phenomena.       Objective:     Blood pressure 110/60, pulse 60, temperature 36.6 °C (97.9 °F), temperature source Temporal, resp. rate 14, weight 80.3 kg (177 lb), SpO2 97 %. There is no height or weight on file to calculate BMI.   Physical Exam:    Constitutional: Alert and oriented X3, patient is talkative with good eye contact.Skin: Warm, dry, good turgor, no rashes in visible areas, no psoriatic lesions, no petechial lesions, no malar rashes  .Eye:  Equal, round and reactive, conjunctiva clear, lids normal EOM intact, eyes are noninjected ENMT: Lips without lesions, good dentition, no oropharyngeal ulcers, mildly dry buccal mucosa but without ulceration, pinna without deformity, no angular chelitis neck: Trachea midline, no masses, no thyromegaly.Lymph:  No cervical lymphadenopathy, no axillary lymphadenopathy, no supraclavicular lymphadenopathyRespiratory: Unlabored respiratory effort, lungs clear to auscultation, no wheezes, no ronchi.Cardiovascular: Normal S1, S2, no murmur, no edema.Abdomen: Soft, non-tender, no masses, no hepatosplenomegaly.Psych: Alert and oriented x3, normal affect and mood.Neuro: Cranial nerves 2-12 are grossly intact, no loss of sensation LEExt:no joint laxity noted in bilateral arms, no joint laxity noted in bilateral legs, do not see any deformities of the hands or the feet, elbows without flexion contractures no nodules no tophi, shoulders full range of motion      Assessment and Plan:     1. Sjogren's syndrome, with unspecified organ involvement (HCC)  Currently on Plaquenil 200 mg alternating daily versus twice daily which is difficult for patient to maintain, we opted to do a trial of Plaquenil 20 mg p.o. daily to see if this helps with his fatigue issue, patient states he thinks it is.  Of note G6PD levels are adequate  Patient needs ophthalmology evaluation every year patient has an ophthalmology evaluation pending January 2019  Patient will need CBC every 6 months, next CBC due about March 2019  - fluticasone (FLONASE) 50 MCG/ACT nasal spray; Spray 1 Spray in nose every day.  - US-SOFT TISSUES OF HEAD - NECK; Future    2. Hypothyroidism, unspecified type  Currently being treated with levothyroxine  Patient does have a positive anti-TPO, today we will check thyroid ultrasound to assure no thyroid nodules  - fluticasone (FLONASE) 50 MCG/ACT nasal spray; Spray 1 Spray in nose every day.  - US-SOFT TISSUES OF HEAD - NECK;  Future    3. Hyperparathyroidism (HCC)  Status post resection of 3 of the 4 parathyroid glands with calcium levels stable  - fluticasone (FLONASE) 50 MCG/ACT nasal spray; Spray 1 Spray in nose every day.  - US-SOFT TISSUES OF HEAD - NECK; Future    4. Thyroiditis  Positive anti-TPO check thyroid ultrasound for thyroid nodules  - fluticasone (FLONASE) 50 MCG/ACT nasal spray; Spray 1 Spray in nose every day.  - US-SOFT TISSUES OF HEAD - NECK; Future    5. Fatigue, unspecified type  Unclear etiology being worked up by his PCP    Followup: Return in about 2 months (around 1/5/2019). or sooner enedina Briceño was seen 30 minutes face-to-face of which more than 50% of the time was spent counseling the patient (excluding time for procedures)  regarding  rheumatological condition and care. Therapy was discussed in detail.    Please note that this dictation was created using voice recognition software. I have made every reasonable attempt to correct obvious errors, but I expect that there are errors of grammar and possibly content that I did not discover before finalizing the note.

## 2018-11-12 ENCOUNTER — TELEPHONE (OUTPATIENT)
Dept: RHEUMATOLOGY | Facility: MEDICAL CENTER | Age: 73
End: 2018-11-12

## 2018-11-13 NOTE — TELEPHONE ENCOUNTER
Please notify patient that we received the results of his thyroid ultrasound and it did not show any problem.  We will see patient at his upcoming appointment January 28, 2019

## 2019-01-28 ENCOUNTER — OFFICE VISIT (OUTPATIENT)
Dept: RHEUMATOLOGY | Facility: MEDICAL CENTER | Age: 74
End: 2019-01-28
Payer: COMMERCIAL

## 2019-01-28 VITALS
WEIGHT: 179 LBS | DIASTOLIC BLOOD PRESSURE: 60 MMHG | HEART RATE: 60 BPM | SYSTOLIC BLOOD PRESSURE: 98 MMHG | RESPIRATION RATE: 16 BRPM | OXYGEN SATURATION: 96 % | TEMPERATURE: 98.1 F

## 2019-01-28 DIAGNOSIS — R53.83 FATIGUE, UNSPECIFIED TYPE: ICD-10-CM

## 2019-01-28 DIAGNOSIS — E03.9 HYPOTHYROIDISM, UNSPECIFIED TYPE: ICD-10-CM

## 2019-01-28 DIAGNOSIS — E21.3 HYPERPARATHYROIDISM (HCC): ICD-10-CM

## 2019-01-28 DIAGNOSIS — M35.00 SJOGREN'S SYNDROME, WITH UNSPECIFIED ORGAN INVOLVEMENT (HCC): ICD-10-CM

## 2019-01-28 DIAGNOSIS — E06.9 THYROIDITIS: ICD-10-CM

## 2019-01-28 DIAGNOSIS — Z79.899 LONG-TERM USE OF PLAQUENIL: ICD-10-CM

## 2019-01-28 PROCEDURE — 99214 OFFICE O/P EST MOD 30 MIN: CPT | Performed by: INTERNAL MEDICINE

## 2019-01-28 RX ORDER — TEMAZEPAM 15 MG/1
15 CAPSULE ORAL NIGHTLY PRN
COMMUNITY
End: 2020-01-27 | Stop reason: SDUPTHER

## 2019-01-28 NOTE — LETTER
[facility]   [address] [place] [address], [address]  [place] [address]  Phone: [phone]  Fax: [phone]              Encounter Date: 1/28/2019    Dear Dr. Rucker ref. provider found,    It was a pleasure seeing your patient, Martin Briceño, on 1/28/2019. Diagnoses of Sjogren's syndrome, with unspecified organ involvement (HCC), Long-term use of Plaquenil, Hyperparathyroidism (HCC), Hypothyroidism, unspecified type, Thyroiditis, and Fatigue, unspecified type were pertinent to this visit.     Please find attached progress note which includes the history I obtained from Mr. Briceño, my physical examination findings, my impression and recommendations.      Once again, it was a pleasure participating in your patient's care.  Please feel free to contact me if you have any questions or if I can be of any further assistance to your patients.      Sincerely,    Yuki Rodríguez M.D.  Electronically Signed          PROGRESS NOTE:  No notes on file

## 2019-01-28 NOTE — PROGRESS NOTES
Chief Complaint- joint pain    Subjective:   Martin Briceño is a 73 y.o. male here today for follow up of rheumatological issues    This is a follow-up visit for this patient who was seen in this clinic for Sjogren's syndrome followed by Dr. Dunbar previously this is the second visit with me.  Patient is currently on Plaquenil at 200 mg p.o. daily predominately for fatigue issues.  Patient uses silo melts for his mouth nightly with helps keep his mouth moist, denies any dental problems does have an ophthalmology evaluation pending and denies any problems with the eyes.  Patient denies any new rashes denies any fevers of unknown etiology denies any parotid gland enlargement.  Patient denies any side effects from the medication, denies any unexplained weight loss, denies any fevers of unknown etiology, denies any GI upset, denies any rashes, denies any new joint swelling, denies recurrent infections.      Additional comorbidities include a history of hyperparathyroidism status post surgical resection of 3 out of the 4 parathyroids and is followed by his PCP.  Patient has a long history of thrombocytopenia followed by his hematologist.     G6PD 12.2 adequate per Dr Pemberton notes 9/20/2018  HBsAg/HbcAb neg 9/2018 [facility]  HCV neg 9/2018 [facility]  FLAVIO neg 8/2017 [facility]  RF neg 8/2017  [facility]  SSA neg 8/2017  [facility]  SSB 36.6 8/2017  [facility]  SP-1 neg 8/2017  [facility]     Current medicines (including changes today)  Current Outpatient Prescriptions   Medication Sig Dispense Refill   • temazepam (RESTORIL) 15 MG Cap Take 15 mg by mouth at bedtime as needed for Sleep.     • fluticasone (FLONASE) 50 MCG/ACT nasal spray Spray 1 Spray in nose every day.     • hydroxychloroquine (PLAQUENIL) 200 MG Tab Alternate 200mg and 400 mg daily 45 Tab 5   • levothyroxine (SYNTHROID) 125 MCG Tab Take 125 mcg by mouth Every morning on an  empty stomach.     • losartan (COZAAR) 25 MG Tab Take 50 mg by mouth every day.     • zolpidem (AMBIEN) 10 MG Tab Take 10 mg by mouth at bedtime as needed for Sleep.     • tadalafil (CIALIS) 10 MG tablet Take 10 mg by mouth as needed for Erectile Dysfunction.     • liothyronine (CYTOMEL) 5 MCG Tab Take 5 mcg by mouth every day.     • aspirin-dipyridamole (AGGRENOX)  MG CAPSULE SR 12 HR Take 1 Cap by mouth 2 times a day.     • Cyanocobalamin (B-12) 1000 MCG Cap Take  by mouth.     • Coenzyme Q10 (COQ10) 400 MG Cap Take  by mouth.     • LIPITOR 20 MG PO TABS 30 mg. 1 po daily     • PRILOSEC 20 MG PO CPDR 1 po daily     • Bepotastine Besilate (BEPREVE) 1.5 % Solution by Ophthalmic route.     • cholecalciferol (D-3-5) 5000 UNIT Cap Take 5,000 Units by mouth every day.     • LOTEMAX 0.5 % Gel      • EPINEPHrine (EPIPEN) 0.3 MG/0.3ML Solution Auto-injector solution for injection 0.3 mg by Intramuscular route.     • CENTRUM PO TABS 1 po daily       No current facility-administered medications for this visit.      He  has a past medical history of Heart Burn; Indigestion; and Unspecified Disorder of Thyroid.    ROS   Other than what is mentioned in HPI or physical exam, there is no history of headaches, double vision or blurred vision. No temporal tenderness or jaw claudication. No history of cataracts or glaucoma. No trouble swallowing difficulties or sore throats.  No chest complaints including chest pain, cough or sputum production. No GI complaints including nausea, vomiting, change in bowel habits, or past peptic ulcer disease. No history of blood in the stools. No urinary complaints including dysuria or frequency. No history of alopecia, photosensitivity, oral ulcerations, Raynaud's phenomena.       Objective:     Blood pressure (!) 98/60, pulse 60, temperature 36.7 °C (98.1 °F), temperature source Temporal, resp. rate 16, weight 81.2 kg (179 lb), SpO2 96 %. There is no height or weight on file to calculate BMI.    Physical Exam:    Constitutional: Alert and oriented X3, patient is talkative with good eye contact.Skin: Warm, dry, good turgor, no rashes in visible areas, no psoriatic lesions, no petechial lesions, no malar rashes  .Eye: Equal, round and reactive, conjunctiva clear, lids normal EOM intactENMT: Lips without lesions, good dentition, no oropharyngeal ulcers, moist buccal mucosa, pinna without deformityNeck: Trachea midline, no masses, no thyromegaly.Lymph:  No cervical lymphadenopathy, no axillary lymphadenopathy, no supraclavicular lymphadenopathyRespiratory: Unlabored respiratory effort, lungs clear to auscultation, no wheezes, no ronchi.Cardiovascular: Normal S1, S2, no murmur, no edema.Abdomen: Soft, non-tender, no masses, no hepatosplenomegaly.Psych: Alert and oriented x3, normal affect and mood.Neuro: Cranial nerves 2-12 are grossly intact, no loss of sensation LEExt:no joint laxity noted in bilateral arms, no joint laxity noted in bilateral legs, no evidence of sausage digits no dactylitis no splayed toes no crossover toes no evidence of swan-neck or boutonniere deformities, gait without antalgia and without foot drop        Assessment and Plan:     1. Sjogren's syndrome, with unspecified organ involvement (HCC)  With sicca symptoms and positive SSB, currently on Plaquenil at 200 mg p.o. daily and also Zylet melts nightly  - COMP METABOLIC PANEL; Future  - CBC WITH DIFFERENTIAL; Future    2. Long-term use of Plaquenil  Patient needs labs every 6 months patient due for labs February 2019, labs ordered for patient  Of note G6PD levels are adequate  Patient scheduled for ophthalmology evaluation pending  - COMP METABOLIC PANEL; Future  - CBC WITH DIFFERENTIAL; Future    3. Hyperparathyroidism (HCC)  Followed by endocrinology  - COMP METABOLIC PANEL; Future  - CBC WITH DIFFERENTIAL; Future    4. Hypothyroidism, unspecified type  Followed by endocrinology    5. Thyroiditis  All of endocrinology    6. Fatigue,  unspecified type  Helped with Plaquenil 20 mg p.o. daily.    Followup: Return in about 6 months (around 7/28/2019). or sooner enedina Briceño was seen 30 minutes face-to-face of which more than 50% of the time was spent counseling the patient (excluding time for procedures)  regarding  rheumatological condition and care. Therapy was discussed in detail.    Please note that this dictation was created using voice recognition software. I have made every reasonable attempt to correct obvious errors, but I expect that there are errors of grammar and possibly content that I did not discover before finalizing the note.

## 2019-04-25 RX ORDER — HYDROXYCHLOROQUINE SULFATE 200 MG/1
TABLET, FILM COATED ORAL
Qty: 90 TAB | Refills: 1 | Status: SHIPPED | OUTPATIENT
Start: 2019-04-25 | End: 2019-11-25 | Stop reason: SDUPTHER

## 2019-04-25 NOTE — TELEPHONE ENCOUNTER
Was the patient seen in the last year in this department? Yes  April labs in media    Does patient have an active prescription for medications requested? No     Received Request Via: Pharmacy

## 2019-07-29 ENCOUNTER — OFFICE VISIT (OUTPATIENT)
Dept: RHEUMATOLOGY | Facility: MEDICAL CENTER | Age: 74
End: 2019-07-29
Payer: COMMERCIAL

## 2019-07-29 VITALS
WEIGHT: 184.75 LBS | RESPIRATION RATE: 14 BRPM | HEART RATE: 68 BPM | OXYGEN SATURATION: 95 % | SYSTOLIC BLOOD PRESSURE: 110 MMHG | DIASTOLIC BLOOD PRESSURE: 58 MMHG | TEMPERATURE: 98.2 F

## 2019-07-29 DIAGNOSIS — Z79.899 LONG-TERM USE OF PLAQUENIL: ICD-10-CM

## 2019-07-29 DIAGNOSIS — E06.9 THYROIDITIS: ICD-10-CM

## 2019-07-29 DIAGNOSIS — M35.00 SJOGREN'S SYNDROME, WITH UNSPECIFIED ORGAN INVOLVEMENT (HCC): ICD-10-CM

## 2019-07-29 DIAGNOSIS — E03.9 HYPOTHYROIDISM, UNSPECIFIED TYPE: ICD-10-CM

## 2019-07-29 DIAGNOSIS — E21.3 HYPERPARATHYROIDISM (HCC): ICD-10-CM

## 2019-07-29 PROCEDURE — 99214 OFFICE O/P EST MOD 30 MIN: CPT | Performed by: INTERNAL MEDICINE

## 2019-07-29 NOTE — LETTER
[facility]   [address] [place] [address], [address]  [place] [address]  Phone: [phone]  Fax: [phone]              Encounter Date: 7/29/2019    Dear Dr. Rucker ref. provider found,    It was a pleasure seeing your patient, Martin Briceño, on 7/29/2019. Diagnoses of Sjogren's syndrome, with unspecified organ involvement (HCC), Long-term use of Plaquenil, Thyroiditis, Hyperparathyroidism (HCC), and Hypothyroidism, unspecified type were pertinent to this visit.     Please find attached progress note which includes the history I obtained from Mr. Briceño, my physical examination findings, my impression and recommendations.      Once again, it was a pleasure participating in your patient's care.  Please feel free to contact me if you have any questions or if I can be of any further assistance to your patients.      Sincerely,    Yuki Rodríguez M.D.  Electronically Signed          PROGRESS NOTE:  No notes on file

## 2019-07-29 NOTE — PROGRESS NOTES
Chief Complaint- joint pain     Subjective:   Martin Briceño is a 74 y.o. male here today for follow up of rheumatological issues    This is a follow-up visit for this patient who is seen in this clinic for Sjogren's syndrome, this is the third visit with me previously followed by Dr. Dunbar.  Patient is currently on Plaquenil 200 mg p.o. daily with symptoms predominantly fatigue.  Patient states that since taking Plaquenil his fatigue issues have been a lot less.  Patient denies any side effects from the medication, denies any unexplained weight loss, denies any fevers of unknown etiology, denies any GI upset, denies any rashes, denies any new joint swelling, denies recurrent infections.  Patient states that his last ophthalmology evaluation was within the last year.     Patient's major issue is upper GI pain, currently being followed by Dr. Gallo up at [facility] gastroenterology.  Thus far no etiology per patient report.    Additional comorbidities include a history of hyperparathyroidism status post surgical resection of 3 out of the 4 parathyroids and is followed by his PCP.  Patient has a long history of thrombocytopenia followed by his hematologist.    Of note, patient does use THC products on a regular basis, as this is considered high risk we will not be able to prescribe any narcotic pain medications for this patient.     G6PD 12.2 adequate per Dr Pemberton notes 9/20/2018  HBsAg/HbcAb neg 9/2018 [facility]  HCV neg 9/2018 [facility]  FLAVIO neg 8/2017 [facility]  RF neg 8/2017  [facility]  SSA neg 8/2017  [facility]    Addendum 10/16/2019   SSA-52/SSA-60 neg 10/2019 [facility]    SSB 36.6 8/2017  [facility]  SP-1 neg 8/2017  [facility]      Addendum 10/8/2019  SS-B neg 10/2019 [facility]        Current medicines (including changes today)  Current Outpatient Prescriptions   Medication Sig Dispense Refill   •  hydroxychloroquine (PLAQUENIL) 200 MG Tab 1 tab po qday 90 Tab 1   • temazepam (RESTORIL) 15 MG Cap Take 15 mg by mouth at bedtime as needed for Sleep.     • fluticasone (FLONASE) 50 MCG/ACT nasal spray Spray 1 Spray in nose every day.     • levothyroxine (SYNTHROID) 125 MCG Tab Take 125 mcg by mouth Every morning on an empty stomach.     • losartan (COZAAR) 25 MG Tab Take 50 mg by mouth every day.     • zolpidem (AMBIEN) 10 MG Tab Take 10 mg by mouth at bedtime as needed for Sleep.     • tadalafil (CIALIS) 10 MG tablet Take 10 mg by mouth as needed for Erectile Dysfunction.     • liothyronine (CYTOMEL) 5 MCG Tab Take 5 mcg by mouth every day.     • Bepotastine Besilate (BEPREVE) 1.5 % Solution by Ophthalmic route.     • aspirin-dipyridamole (AGGRENOX)  MG CAPSULE SR 12 HR Take 1 Cap by mouth 2 times a day.     • Cyanocobalamin (B-12) 1000 MCG Cap Take  by mouth.     • cholecalciferol (D-3-5) 5000 UNIT Cap Take 5,000 Units by mouth every day.     • Coenzyme Q10 (COQ10) 400 MG Cap Take  by mouth.     • LIPITOR 20 MG PO TABS 30 mg. 1 po daily     • PRILOSEC 20 MG PO CPDR 1 po daily     • LOTEMAX 0.5 % Gel      • EPINEPHrine (EPIPEN) 0.3 MG/0.3ML Solution Auto-injector solution for injection 0.3 mg by Intramuscular route.     • CENTRUM PO TABS 1 po daily       No current facility-administered medications for this visit.      He  has a past medical history of Heart Burn; Indigestion; and Unspecified Disorder of Thyroid.    ROS   Other than what is mentioned in HPI or physical exam, there is no history of headaches, double vision or blurred vision. No temporal tenderness or jaw claudication. No history of cataracts or glaucoma. No trouble swallowing difficulties or sore throats.  No chest complaints including chest pain, cough or sputum production. No GI complaints including nausea, vomiting, change in bowel habits, or past peptic ulcer disease. No history of blood in the stools. No urinary complaints including  dysuria or frequency. No history of alopecia, photosensitivity, oral ulcerations, Raynaud's phenomena.       Objective:     /58   Pulse 68   Temp 36.8 °C (98.2 °F) (Temporal)   Resp 14   Wt 83.8 kg (184 lb 11.9 oz)   SpO2 95%  There is no height or weight on file to calculate BMI.   Physical Exam:    Constitutional: Alert and oriented X3, patient is talkative with good eye contact.Skin: Warm, dry, good turgor, no rashes in visible areas no real apparent dry skin.Eye: Equal, round and reactive, conjunctiva clear, lids normal EOM intactENMT: Lips without lesions, good dentition, no oropharyngeal ulcers, moist buccal mucosa, pinna without deformity, no parotid gland enlargement neck: Trachea midline, no masses, no thyromegaly.Lymph:  No cervical lymphadenopathy, no axillary lymphadenopathy, no supraclavicular lymphadenopathyRespiratory: Unlabored respiratory effort, lungs clear to auscultation, no wheezes, no ronchi.Cardiovascular: Normal S1, S2, no murmur, no edema.Abdomen: Soft, non-tender, no masses, no hepatosplenomegaly.Psych: Alert and oriented x3, normal affect and mood.Neuro: Cranial nerves 2-12 are grossly intact, no loss of sensation LEExt:no joint laxity noted in bilateral arms, no joint laxity noted in bilateral legs, joints with full range of motion minimal crepitus in knees no dactylitis no joint deformities shoulders full range of motion without limitation gait without antalgia without foot drop, no Achilles inflammation     Assessment and Plan:     1. Sjogren's syndrome, with unspecified organ involvement (HCC)  Clinically stable, continue Plaquenil 200 mg p.o. daily, patient can take an extra tablet a day if fatigue is exacerbated  Patient on Plaquenil 200 mg p.o. daily, patient needs CBC every 6 months next CBC due October 2019, labs ordered for patient  Of note G6PD levels are adequate  Patient states his last ophthalmology evaluation was within the year  - Comp Metabolic Panel; Future  -  CBC WITH DIFFERENTIAL; Future  - WESTERGREN SED RATE; Future  - SSA 52 AND 60 (RO)(RAJ) AB, IGG; Future  - SSB(LA)(RAJ)IGG AB; Future    2. Thyroiditis  On thyroid medication  Can exacerbate joint pain, I recommend monitoring thyroid on a regular basis to assure it is not contributing to the patient's joint pain  - Comp Metabolic Panel; Future  - CBC WITH DIFFERENTIAL; Future  - WESTERGREN SED RATE; Future  - SSA 52 AND 60 (RO)(RAJ) AB, IGG; Future  - SSB(LA)(RAJ)IGG AB; Future    3. Hyperparathyroidism (HCC)  Followed by endocrinology  - Comp Metabolic Panel; Future  - CBC WITH DIFFERENTIAL; Future  - WESTERGREN SED RATE; Future  - SSA 52 AND 60 (RO)(RAJ) AB, IGG; Future  - SSB(LA)(RAJ)IGG AB; Future    4. Plaquenil  On Plaquenil 200 mg p.o. daily, patient did take an extra tablet on days with fatigue is a problem.  Patient will need labs every 6 months, next labs due October 2019, labs ordered for patient  Of note G6PD levels are adequate  Followup: Return in about 6 months (around 1/29/2020). or sooner enedina Briceño  was seen 30 minutes face-to-face of which more than 50% of the time was spent counseling the patient (excluding time for procedures)  regarding  rheumatological condition and care. Therapy was discussed in detail.      Please note that this dictation was created using voice recognition software. I have made every reasonable attempt to correct obvious errors, but I expect that there are errors of grammar and possibly content that I did not discover before finalizing the note.

## 2019-10-07 ENCOUNTER — HOSPITAL ENCOUNTER (OUTPATIENT)
Dept: LAB | Facility: MEDICAL CENTER | Age: 74
End: 2019-10-07
Attending: INTERNAL MEDICINE
Payer: COMMERCIAL

## 2019-10-07 ENCOUNTER — TELEPHONE (OUTPATIENT)
Dept: RHEUMATOLOGY | Facility: MEDICAL CENTER | Age: 74
End: 2019-10-07

## 2019-10-07 DIAGNOSIS — R79.89 LFT ELEVATION: ICD-10-CM

## 2019-10-07 LAB
ALT SERPL-CCNC: 36 U/L (ref 2–50)
AST SERPL-CCNC: 42 U/L (ref 12–45)

## 2019-10-07 PROCEDURE — 84450 TRANSFERASE (AST) (SGOT): CPT

## 2019-10-07 PROCEDURE — 84460 ALANINE AMINO (ALT) (SGPT): CPT

## 2019-10-07 PROCEDURE — 36415 COLL VENOUS BLD VENIPUNCTURE: CPT

## 2019-10-07 NOTE — TELEPHONE ENCOUNTER
Please notify patient that we received the results of his blood tests from October 4, 2019 and his liver tests are quite high  Did patient drink any alcohol within 3 days of doing the blood test?  If he did then this can cause a falsely elevated liver function    We need to recheck his liver tests, labs of been ordered in the computer, please do not drink any alcohol including no beer or Tylenol within 3 days of doing the blood test.

## 2019-10-10 ENCOUNTER — TELEPHONE (OUTPATIENT)
Dept: RHEUMATOLOGY | Facility: MEDICAL CENTER | Age: 74
End: 2019-10-10

## 2019-10-10 NOTE — TELEPHONE ENCOUNTER
Tex called wanting to touch base with you about the results of his Liver numbers.  They are still higher than they normally run and he doesn't drink and hasnt taken any Tylenol.  Please call him.  Thank you

## 2019-10-10 NOTE — TELEPHONE ENCOUNTER
Please notify patient that his repeat liver test study done October 7, 2019 are in normal range now    Not sure why his liver tests were elevated, could be 1 of the THC products that he uses?  May be check with his primary care doctor.

## 2019-10-31 ENCOUNTER — TELEPHONE (OUTPATIENT)
Dept: RHEUMATOLOGY | Facility: MEDICAL CENTER | Age: 74
End: 2019-10-31

## 2019-10-31 NOTE — TELEPHONE ENCOUNTER
Tex called today stating he has really been experiencing a lot more fatigue. He wanted to know if you thought he needed to increase the Plaquenil from 1X a day to twice daily?   Please advise and thank you

## 2019-11-25 RX ORDER — HYDROXYCHLOROQUINE SULFATE 200 MG/1
TABLET, FILM COATED ORAL
Qty: 180 TAB | Refills: 0 | Status: SHIPPED | OUTPATIENT
Start: 2019-11-25 | End: 2020-01-07

## 2019-11-25 NOTE — TELEPHONE ENCOUNTER
Was the patient seen in the last year in this department? Yes  [facility]   Does patient have an active prescription for medications requested? No     Received Request Via: Pharmacy

## 2019-11-27 ENCOUNTER — OFFICE VISIT (OUTPATIENT)
Dept: URGENT CARE | Facility: CLINIC | Age: 74
End: 2019-11-27
Payer: MEDICARE

## 2019-11-27 VITALS
DIASTOLIC BLOOD PRESSURE: 60 MMHG | SYSTOLIC BLOOD PRESSURE: 106 MMHG | OXYGEN SATURATION: 94 % | WEIGHT: 175 LBS | RESPIRATION RATE: 16 BRPM | HEART RATE: 56 BPM | TEMPERATURE: 99.7 F

## 2019-11-27 DIAGNOSIS — M62.830 SPASM OF THORACIC BACK MUSCLE: ICD-10-CM

## 2019-11-27 DIAGNOSIS — M54.6 ACUTE THORACIC BACK PAIN, UNSPECIFIED BACK PAIN LATERALITY: ICD-10-CM

## 2019-11-27 LAB
APPEARANCE UR: CLEAR
BILIRUB UR STRIP-MCNC: NORMAL MG/DL
COLOR UR AUTO: YELLOW
GLUCOSE UR STRIP.AUTO-MCNC: NORMAL MG/DL
KETONES UR STRIP.AUTO-MCNC: NORMAL MG/DL
LEUKOCYTE ESTERASE UR QL STRIP.AUTO: NORMAL
NITRITE UR QL STRIP.AUTO: NORMAL
PH UR STRIP.AUTO: 5.5 [PH] (ref 5–8)
PROT UR QL STRIP: NORMAL MG/DL
RBC UR QL AUTO: NORMAL
SP GR UR STRIP.AUTO: 1.03
UROBILINOGEN UR STRIP-MCNC: 0.2 MG/DL

## 2019-11-27 PROCEDURE — 81002 URINALYSIS NONAUTO W/O SCOPE: CPT | Performed by: PHYSICIAN ASSISTANT

## 2019-11-27 PROCEDURE — 99204 OFFICE O/P NEW MOD 45 MIN: CPT | Performed by: PHYSICIAN ASSISTANT

## 2019-11-27 RX ORDER — FLUOROMETHOLONE ACETATE 1 MG/ML
SUSPENSION/ DROPS OPHTHALMIC
Refills: 0 | COMMUNITY
Start: 2019-11-11 | End: 2020-01-27

## 2019-11-27 RX ORDER — MULTIVIT WITH MINERALS/LUTEIN
1000 TABLET ORAL
COMMUNITY

## 2019-11-27 RX ORDER — CYCLOBENZAPRINE HCL 5 MG
5-10 TABLET ORAL 3 TIMES DAILY PRN
Qty: 30 TAB | Refills: 0 | Status: SHIPPED | OUTPATIENT
Start: 2019-11-27 | End: 2020-01-27

## 2019-11-27 RX ORDER — CYCLOSPORINE 0.5 MG/ML
1 EMULSION OPHTHALMIC
COMMUNITY
Start: 2017-05-11 | End: 2020-01-27

## 2019-11-27 RX ORDER — PHENOL 1.4 %
1800 AEROSOL, SPRAY (ML) MUCOUS MEMBRANE
COMMUNITY
End: 2020-05-29

## 2019-11-27 RX ORDER — METHYLPREDNISOLONE 4 MG/1
TABLET ORAL
Qty: 21 TAB | Refills: 0 | Status: SHIPPED | OUTPATIENT
Start: 2019-11-27 | End: 2020-01-27

## 2019-11-27 ASSESSMENT — ENCOUNTER SYMPTOMS
BACK PAIN: 1
NAUSEA: 0
ABDOMINAL PAIN: 0
VOMITING: 0
FEVER: 0
COUGH: 0
CHILLS: 0
SHORTNESS OF BREATH: 0

## 2019-11-27 NOTE — PROGRESS NOTES
Subjective:   Martin Briceño  is a 74 y.o. male who presents for Back Pain (left side mid- back pain x1 days )        Back Pain   Pertinent negatives include no abdominal pain or fever.     Patient notes last 24 to 48 hours of pain in the left hip area back.  Notes no radiation of pain in the legs or upper back but notes some radiation across to right-sided back with movement.  Denies past medical history of similar.  Denies history of back injuries or surgeries.  Denies history of renal abnormalities.  Denies history of kidney stones or urinary infection.  Denies any abnormalities of urine noted now.  Denies dysuria frequency urgency hematuria.  Denies fevers chills nausea vomiting abdominal pain.  Has tried Tylenol for pain with little improvement.  Notes he recently moved homes.  He is been lifting many boxes and building shelving units over the last few days and suspects this may have contributed to onset of back pain.  Long-term use of Plaquenil.  Patient denies feeling sick denying fevers or chills signs of illness.    Review of Systems   Constitutional: Negative for chills and fever.   Respiratory: Negative for cough and shortness of breath.    Gastrointestinal: Negative for abdominal pain, nausea and vomiting.   Musculoskeletal: Positive for back pain.   Skin: Negative for rash.     Allergies   Allergen Reactions   • Mirtazapine Unspecified     Keeps him up       Objective:   /60   Pulse (!) 56   Temp 37.6 °C (99.7 °F) (Temporal)   Resp 16   Wt 79.4 kg (175 lb)   SpO2 94%   Physical Exam  Vitals signs and nursing note reviewed.   Constitutional:       General: He is not in acute distress.     Appearance: He is well-developed. He is not diaphoretic.   HENT:      Head: Normocephalic and atraumatic.      Right Ear: External ear normal.      Left Ear: External ear normal.      Nose: Nose normal.   Eyes:      General: No scleral icterus.        Right eye: No discharge.         Left eye: No discharge.       Conjunctiva/sclera: Conjunctivae normal.   Neck:      Musculoskeletal: Neck supple.   Pulmonary:      Effort: Pulmonary effort is normal. No respiratory distress.   Musculoskeletal:      Thoracic back: He exhibits decreased range of motion, tenderness, bony tenderness ( trace area of focal upper ttp, grossly normal, minimal pain), pain and spasm. He exhibits no swelling, no edema, no deformity, no laceration and normal pulse.        Back:    Skin:     General: Skin is warm and dry.      Coloration: Skin is not pale.   Neurological:      Mental Status: He is alert and oriented to person, place, and time.      Coordination: Coordination normal.     POCT UA -- NEG / NORMAL      Assessment/Plan:   1. Acute thoracic back pain, unspecified back pain laterality  - POCT Urinalysis  - methylPREDNISolone (MEDROL DOSEPAK) 4 MG Tablet Therapy Pack; Follow schedule on package instructions.  Dispense: 21 Tab; Refill: 0  - cyclobenzaprine (FLEXERIL) 5 MG tablet; Take 1-2 Tabs by mouth 3 times a day as needed.  Dispense: 30 Tab; Refill: 0    2. Spasm of thoracic back muscle    Other orders  - Calcium Carbonate 600 MG Tab; Take 1,800 mg by mouth.  - Ascorbic Acid (VITAMIN C) 1000 MG Tab; Take 1,000 mg by mouth.  - cyclosporin (RESTASIS) 0.05 % ophthalmic emulsion; 1 Drop.  - Lifitegrast (XIIDRA) 5 % Solution  - FLAREX 0.1 % ophthalmic suspension; INSTILL 1 DROP BOTH EYES DAILY AS NEEDED; Refill: 0  Recommend conservative care, rest, ice/heat, work on gentle ROM exercises - sent w/ stretching exercises, discuss red flag s/sx to RTC vs ER -   Cautioned regarding potential side effects of steroid, avoid nsaids while using  Return to clinic with lack of resolution or progression of symptoms.  Discuss   Cautioned regarding potential for sedation with medication.    Differential diagnosis, natural history, supportive care, and indications for immediate follow-up discussed.

## 2019-12-01 ENCOUNTER — TELEPHONE (OUTPATIENT)
Dept: URGENT CARE | Facility: CLINIC | Age: 74
End: 2019-12-01

## 2019-12-02 DIAGNOSIS — M62.830 SPASM OF THORACIC BACK MUSCLE: ICD-10-CM

## 2019-12-02 DIAGNOSIS — M54.6 ACUTE THORACIC BACK PAIN, UNSPECIFIED BACK PAIN LATERALITY: ICD-10-CM

## 2019-12-02 NOTE — PROGRESS NOTES
Patient contacts clinic will request referral for follow-up with persistent back pain symptoms.  Sports medicine referral urgently placed today    BH

## 2019-12-04 ENCOUNTER — OFFICE VISIT (OUTPATIENT)
Dept: MEDICAL GROUP | Facility: CLINIC | Age: 74
End: 2019-12-04
Payer: MEDICARE

## 2019-12-04 VITALS
RESPIRATION RATE: 14 BRPM | OXYGEN SATURATION: 98 % | HEIGHT: 70 IN | DIASTOLIC BLOOD PRESSURE: 60 MMHG | TEMPERATURE: 98.2 F | SYSTOLIC BLOOD PRESSURE: 102 MMHG | BODY MASS INDEX: 25.05 KG/M2 | HEART RATE: 60 BPM | WEIGHT: 175 LBS

## 2019-12-04 DIAGNOSIS — S29.012A STRAIN OF THORACIC PARASPINAL MUSCLES EXCLUDING T1 AND T2 LEVELS, INITIAL ENCOUNTER: ICD-10-CM

## 2019-12-04 PROCEDURE — 99202 OFFICE O/P NEW SF 15 MIN: CPT | Performed by: FAMILY MEDICINE

## 2019-12-04 ASSESSMENT — ENCOUNTER SYMPTOMS
SHORTNESS OF BREATH: 0
FEVER: 0
VOMITING: 0
FOCAL WEAKNESS: 0
BACK PAIN: 1

## 2019-12-04 NOTE — PROGRESS NOTES
Subjective:     Martin Briceño is a 74 y.o. male who presents for Back Pain (No known history of back issues. Last week, on knees, slightly bent forward, building shelving and moving baskets. ) and Flank Pain (Left side. )    HPI  Pt presents for evaluation of back pain   Pt with back pain which started about a week ago   Has been moving houses and has been more active lately   No distinct strain or injury that he can recall, however does recall doing repetitive side-to-side twisting motions  Having pain which is more in the mid back   Pain more on the left side   Pain will radiate into the low back and into sides   Pain is been improving since onset  Was given steroids in urgent care and thinks they helped a little  Was given some exercises to do urgent care, however some of them are too difficult/painful to do at home    Review of Systems   Constitutional: Negative for fever.   Respiratory: Negative for shortness of breath.    Cardiovascular: Negative for chest pain.   Gastrointestinal: Negative for vomiting.   Musculoskeletal: Positive for back pain.   Skin: Negative for rash.   Neurological: Negative for focal weakness.       PMH:  has a past medical history of Heart burn, Indigestion, and Unspecified disorder of thyroid.  MEDS:   Current Outpatient Medications:   •  Calcium Carbonate 600 MG Tab, Take 1,800 mg by mouth., Disp: , Rfl:   •  Ascorbic Acid (VITAMIN C) 1000 MG Tab, Take 1,000 mg by mouth., Disp: , Rfl:   •  cyclosporin (RESTASIS) 0.05 % ophthalmic emulsion, 1 Drop., Disp: , Rfl:   •  Lifitegrast (XIIDRA) 5 % Solution, , Disp: , Rfl:   •  FLAREX 0.1 % ophthalmic suspension, INSTILL 1 DROP BOTH EYES DAILY AS NEEDED, Disp: , Rfl: 0  •  methylPREDNISolone (MEDROL DOSEPAK) 4 MG Tablet Therapy Pack, Follow schedule on package instructions., Disp: 21 Tab, Rfl: 0  •  cyclobenzaprine (FLEXERIL) 5 MG tablet, Take 1-2 Tabs by mouth 3 times a day as needed., Disp: 30 Tab, Rfl: 0  •  hydroxychloroquine  (PLAQUENIL) 200 MG Tab, 1 tab po twice daily, Disp: 180 Tab, Rfl: 0  •  temazepam (RESTORIL) 15 MG Cap, Take 15 mg by mouth at bedtime as needed for Sleep., Disp: , Rfl:   •  fluticasone (FLONASE) 50 MCG/ACT nasal spray, Spray 1 Spray in nose every day., Disp: , Rfl:   •  levothyroxine (SYNTHROID) 125 MCG Tab, Take 125 mcg by mouth Every morning on an empty stomach., Disp: , Rfl:   •  losartan (COZAAR) 25 MG Tab, Take 50 mg by mouth every day., Disp: , Rfl:   •  zolpidem (AMBIEN) 10 MG Tab, Take 10 mg by mouth at bedtime as needed for Sleep., Disp: , Rfl:   •  tadalafil (CIALIS) 10 MG tablet, Take 10 mg by mouth as needed for Erectile Dysfunction., Disp: , Rfl:   •  liothyronine (CYTOMEL) 5 MCG Tab, Take 5 mcg by mouth every day., Disp: , Rfl:   •  Bepotastine Besilate (BEPREVE) 1.5 % Solution, by Ophthalmic route., Disp: , Rfl:   •  aspirin-dipyridamole (AGGRENOX)  MG CAPSULE SR 12 HR, Take 1 Cap by mouth 2 times a day., Disp: , Rfl:   •  Cyanocobalamin (B-12) 1000 MCG Cap, Take  by mouth., Disp: , Rfl:   •  cholecalciferol (D-3-5) 5000 UNIT Cap, Take 5,000 Units by mouth every day., Disp: , Rfl:   •  Coenzyme Q10 (COQ10) 400 MG Cap, Take  by mouth., Disp: , Rfl:   •  LOTEMAX 0.5 % Gel, , Disp: , Rfl:   •  EPINEPHrine (EPIPEN) 0.3 MG/0.3ML Solution Auto-injector solution for injection, 0.3 mg by Intramuscular route., Disp: , Rfl:   •  LIPITOR 20 MG PO TABS, 30 mg. 1 po daily, Disp: , Rfl:   •  PRILOSEC 20 MG PO CPDR, 1 po daily, Disp: , Rfl:   •  CENTRUM PO TABS, 1 po daily, Disp: , Rfl:   ALLERGIES:   Allergies   Allergen Reactions   • Mirtazapine Unspecified     Keeps him up      SURGHX:   Past Surgical History:   Procedure Laterality Date   • GASTROSCOPY WITH BIOPSY  6/11/08    Performed by JAYNE RIZO at [facility]   • EGD W/ENDOSCOPIC ULTRASOUND  6/11/08    Performed by JAYNE RIZO at [facility]   • OTHER      partial thyroidectomy 2003   • OTHER  "ABDOMINAL SURGERY      appendectomy 1976     SOCHX:  reports that he has never smoked. He has never used smokeless tobacco.  FH: Family history was reviewed, not contributing to acute pain      Objective:   /60 (BP Location: Left arm, Patient Position: Sitting, BP Cuff Size: Adult)   Pulse 60   Temp 36.8 °C (98.2 °F) (Temporal)   Resp 14   Ht 1.778 m (5' 10\")   Wt 79.4 kg (175 lb)   SpO2 98%   BMI 25.11 kg/m²     Physical Exam  Constitutional:       General: He is not in acute distress.     Appearance: He is well-developed. He is not diaphoretic.   HENT:      Head: Normocephalic and atraumatic.   Musculoskeletal:      Comments: Back:  General: No asymmetry, bruising, or erythema appreciated  ROM: flexion 90°, extension 30°, lateral bend 30°, lateral twist 30°  Palpation: +Mild TTP along left latissimus dorsi and paraspinal muscles, no tenderness to palpation of spinous processes, no step-offs appreciated, no tenderness to palpation of right paraspinal muscles, no significant scoliosis appreciated  Strength: 5/5 hip flexion/extension  Neuro: Sensation intact and equal bilaterally in LE's   Skin:     General: Skin is warm and dry.      Findings: No rash.   Neurological:      Mental Status: He is alert and oriented to person, place, and time.   Psychiatric:         Behavior: Behavior normal.         Thought Content: Thought content normal.         Judgment: Judgment normal.         Assessment/Plan:   Assessment    1. Strain of thoracic paraspinal muscles excluding T1 and T2 levels, initial encounter  Patient is a 74-year-old male with thoracic muscle strain.  Also appears to have strained latissimus dorsi.  Advised that pain should slowly improve on its own, though could be helped by heat, gentle stretching, gentle exercise, and prevention of reinjury.  Did not recommend trigger point injections at this time.  Patient given list of exercises to begin doing and as long as he is making full recovery on his " own, will not require follow-up.  If not making full recovery over the next 2 to 3 weeks, will call back and would plan to get into physical therapy.

## 2020-01-08 RX ORDER — HYDROXYCHLOROQUINE SULFATE 200 MG/1
TABLET, FILM COATED ORAL
Qty: 180 TAB | Refills: 0 | Status: SHIPPED | OUTPATIENT
Start: 2020-01-08 | End: 2020-03-23

## 2020-01-14 ENCOUNTER — OFFICE VISIT (OUTPATIENT)
Dept: RHEUMATOLOGY | Facility: MEDICAL CENTER | Age: 75
End: 2020-01-14
Payer: MEDICARE

## 2020-01-14 VITALS
TEMPERATURE: 98.2 F | WEIGHT: 171 LBS | BODY MASS INDEX: 24.54 KG/M2 | RESPIRATION RATE: 14 BRPM | DIASTOLIC BLOOD PRESSURE: 70 MMHG | HEART RATE: 60 BPM | OXYGEN SATURATION: 97 % | SYSTOLIC BLOOD PRESSURE: 120 MMHG

## 2020-01-14 DIAGNOSIS — E21.3 HYPERPARATHYROIDISM (HCC): ICD-10-CM

## 2020-01-14 DIAGNOSIS — E55.9 VITAMIN D DEFICIENCY: ICD-10-CM

## 2020-01-14 DIAGNOSIS — E06.9 THYROIDITIS: ICD-10-CM

## 2020-01-14 DIAGNOSIS — K57.92 DIVERTICULITIS: ICD-10-CM

## 2020-01-14 DIAGNOSIS — M35.00 SJOGREN'S SYNDROME, WITH UNSPECIFIED ORGAN INVOLVEMENT (HCC): ICD-10-CM

## 2020-01-14 DIAGNOSIS — Z79.899 LONG-TERM USE OF PLAQUENIL: ICD-10-CM

## 2020-01-14 PROCEDURE — 99214 OFFICE O/P EST MOD 30 MIN: CPT | Performed by: INTERNAL MEDICINE

## 2020-01-14 RX ORDER — CYCLOSPORINE 0 G/ML
1 SOLUTION/ DROPS OPHTHALMIC; TOPICAL
COMMUNITY
End: 2020-08-26

## 2020-01-14 NOTE — PROGRESS NOTES
Chief Complaint- joint pain     Subjective:   Martin Briceño is a 74 y.o. male here today for follow up of rheumatological issues    This is a follow-up visit for this patient who we see in this clinic for Sjogren's syndrome, this is the fourth visit with me previously followed by Dr. Dunbar.  Patient is currently on Plaquenil 200 mg p.o. twice daily without any overt symptoms of Sjogren's.   Patient denies any side effects from the medication, denies any unexplained weight loss, denies any fevers of unknown etiology, denies any GI upset, denies any rashes, denies any new joint swelling, denies recurrent infections.  Of note patient has an upcoming ophthalmology evaluation was February 2020 at [facility], patient denies any teeth loss denies any angular colitis denies any oral ulcerations, uses eyedrops for dry eyes    Patient here today complaining of left-sided abdominal pain, denies any urinary frequency or dysuria, denies any bruising, patient does have a GI physician Dr. Gallo at [facility] for which patient last saw 6 months ago but did not contact his GI physician.  After extensive questioning patient does have a diagnosis of diverticulosis, patient has been eating a lot of nuts lately, suspect diverticulitis, patient states symptoms are resolving.     Additional comorbidities include a history of hyperparathyroidism status post surgical resection of 3 out of the 4 parathyroids and is followed by his PCP.  Patient has a long history of thrombocytopenia followed by his hematologist.     Of note, patient does use THC products on a regular basis, and also takes a benzo diazepam nightly, as this is considered high risk medication use and high risk recreational drug use, we will not be able to prescribe any narcotic pain medications for this patient in this clinic.     G6PD 12.2 adequate per Dr Pemberton notes 9/20/2018  HBsAg/HbcAb neg 9/2018 [facility]  HCV neg 9/2018 [facility]  [facility]  LFAVIO neg 8/2017 [facility]  RF neg 8/2017  [facility]  SSA neg 8/2017  [facility]    Addendum 5/21/2020  SSA neg 5/2020  SSB neg 5/2020       SSA-52/SSA-60 neg 10/2019 [facility]  SSB 36.6 8/2017  [facility]; SS-B neg 10/2019 [facility]  SP-1 neg 8/2017  [facility]       Current medicines (including changes today)  Current Outpatient Medications   Medication Sig Dispense Refill   • cycloSPORINE, PF, (CEQUA) 0.09 % Solution by Ophthalmic route.     • hydroxychloroquine (PLAQUENIL) 200 MG Tab 1 tab p.o. twice daily 180 Tab 0   • Calcium Carbonate 600 MG Tab Take 1,800 mg by mouth.     • Ascorbic Acid (VITAMIN C) 1000 MG Tab Take 1,000 mg by mouth.     • Lifitegrast (XIIDRA) 5 % Solution      • FLAREX 0.1 % ophthalmic suspension INSTILL 1 DROP BOTH EYES DAILY AS NEEDED  0   • temazepam (RESTORIL) 15 MG Cap Take 15 mg by mouth at bedtime as needed for Sleep.     • fluticasone (FLONASE) 50 MCG/ACT nasal spray Spray 1 Spray in nose every day.     • levothyroxine (SYNTHROID) 125 MCG Tab Take 125 mcg by mouth Every morning on an empty stomach.     • losartan (COZAAR) 25 MG Tab Take 50 mg by mouth every day.     • zolpidem (AMBIEN) 10 MG Tab Take 10 mg by mouth at bedtime as needed for Sleep.     • tadalafil (CIALIS) 10 MG tablet Take 10 mg by mouth as needed for Erectile Dysfunction.     • liothyronine (CYTOMEL) 5 MCG Tab Take 5 mcg by mouth every day.     • Bepotastine Besilate (BEPREVE) 1.5 % Solution by Ophthalmic route.     • aspirin-dipyridamole (AGGRENOX)  MG CAPSULE SR 12 HR Take 1 Cap by mouth 2 times a day.     • Cyanocobalamin (B-12) 1000 MCG Cap Take  by mouth.     • cholecalciferol (D-3-5) 5000 UNIT Cap Take 5,000 Units by mouth every day.     • Coenzyme Q10 (COQ10) 400 MG Cap Take  by mouth.     • EPINEPHrine (EPIPEN) 0.3 MG/0.3ML Solution Auto-injector solution for injection 0.3 mg by Intramuscular route.     •  LIPITOR 20 MG PO TABS 30 mg. 1 po daily     • PRILOSEC 20 MG PO CPDR 1 po daily     • cyclosporin (RESTASIS) 0.05 % ophthalmic emulsion 1 Drop.     • methylPREDNISolone (MEDROL DOSEPAK) 4 MG Tablet Therapy Pack Follow schedule on package instructions. (Patient not taking: Reported on 1/14/2020) 21 Tab 0   • cyclobenzaprine (FLEXERIL) 5 MG tablet Take 1-2 Tabs by mouth 3 times a day as needed. (Patient not taking: Reported on 1/14/2020) 30 Tab 0   • LOTEMAX 0.5 % Gel      • CENTRUM PO TABS 1 po daily       No current facility-administered medications for this visit.      He  has a past medical history of Heart burn, Indigestion, and Unspecified disorder of thyroid.    ROS   Other than what is mentioned in HPI or physical exam, there is no history of headaches, double vision or blurred vision. No temporal tenderness or jaw claudication. No trouble swallowing difficulties or sore throats.  No chest complaints including chest pain, cough or sputum production. No GI complaints including nausea, vomiting, change in bowel habits, or past peptic ulcer disease. No history of blood in the stools. No urinary complaints including dysuria or frequency. No history of alopecia, photosensitivity, oral ulcerations, Raynaud's phenomena.       Objective:     /70   Pulse 60   Temp 36.8 °C (98.2 °F) (Temporal)   Resp 14   Wt 77.6 kg (171 lb)   SpO2 97%  Body mass index is 24.54 kg/m².   Physical Exam:    Constitutional: Alert and oriented X3, patient is talkative with good eye contact.Skin: Warm, dry, good turgor, no rashes in visible areas.Eye: Equal, round and reactive, conjunctiva clear, lids normal EOM intactENMT: Lips without lesions, good dentition, no oropharyngeal ulcers, moist buccal mucosa, pinna without deformityNeck: Trachea midline, no masses, no thyromegaly.Lymph:  No cervical lymphadenopathy, no axillary lymphadenopathy, no supraclavicular lymphadenopathyRespiratory: Unlabored respiratory effort, lungs clear to  auscultation, no wheezes, no ronchi.Cardiovascular: Normal S1, S2, no murmur, no edema.Abdomen: Soft, non-tender, no masses, no hepatosplenomegaly, tenderness to palpation left middle quadrant, no rebound no guarding.Psych: Alert and oriented x3, normal affect and mood.Neuro: Cranial nerves 2-12 are grossly intact, no loss of sensation LEExt:no joint laxity noted in bilateral arms, no joint laxity noted in bilateral legs, no Raynaud's no digital tip ulcers    Lab Results   Component Value Date/Time    ASTSGOT 42 10/07/2019 04:44 PM    ALTSGPT 36 10/07/2019 04:44 PM      Assessment and Plan:     1. Sjogren's syndrome, with unspecified organ involvement (HCC)  Currently serologically negative, Plaquenil seems to help patient's fatigue per patient report, will continue Plaquenil at 200 mg p.o. twice daily.  - Comp Metabolic Panel; Future  - CBC WITH DIFFERENTIAL; Future  - VITAMIN D,25 HYDROXY; Future  - WESTERGREN SED RATE; Future    2. Long-term use of Plaquenil  On Plaquenil 200 mg p.o. twice daily of note G6PD levels are adequate, next ophthalmology evaluation pending February 2020 with [facility]  Patient needs monitoring labs every 6 months next labs will be due April 2020, labs ordered for patient  - Comp Metabolic Panel; Future  - CBC WITH DIFFERENTIAL; Future  - VITAMIN D,25 HYDROXY; Future  - WESTERGREN SED RATE; Future    3. Thyroiditis  Followed by endocrinology, currently on levothyroxine  - Comp Metabolic Panel; Future  - CBC WITH DIFFERENTIAL; Future  - VITAMIN D,25 HYDROXY; Future  - WESTERGREN SED RATE; Future    4. Hyperparathyroidism (HCC)  Followed by endocrinology, calcium levels not elevated  - Comp Metabolic Panel; Future  - CBC WITH DIFFERENTIAL; Future  - VITAMIN D,25 HYDROXY; Future  - WESTERGREN SED RATE; Future    5. Diverticulitis  Secondary to eating a lot of nuts in the light of diverticulosis, advised patient to encourage fiber, avoid small nuts and seeds, patient states  symptoms are better, if symptoms worsen strongly recommend going to ER for possible need of antibiotics, patient currently without fever and is stated before symptoms are improving  Patient will be asking his PCP for a referral to a new GI here in [place] as patient has changed all his doctors from [facility] to [place].    6. Vitamin D deficiency  Today check vitamin D level to evaluate for need of supplementation  - cycloSPORINE, PF, (CEQUA) 0.09 % Solution; by Ophthalmic route.  - Comp Metabolic Panel; Future  - CBC WITH DIFFERENTIAL; Future  - VITAMIN D,25 HYDROXY; Future  - WESTERGREN SED RATE; Future    Followup: Return in about 6 months (around 7/14/2020). or sooner enedina Briceño  was seen 30 minutes face-to-face of which more than 50% of the time was spent counseling the patient (excluding time for procedures)  regarding  rheumatological condition and care. Therapy was discussed in detail.      Please note that this dictation was created using voice recognition software. I have made every reasonable attempt to correct obvious errors, but I expect that there are errors of grammar and possibly content that I did not discover before finalizing the note.

## 2020-01-27 ENCOUNTER — OFFICE VISIT (OUTPATIENT)
Dept: MEDICAL GROUP | Facility: MEDICAL CENTER | Age: 75
End: 2020-01-27
Payer: MEDICARE

## 2020-01-27 VITALS
HEART RATE: 64 BPM | SYSTOLIC BLOOD PRESSURE: 136 MMHG | WEIGHT: 170.5 LBS | BODY MASS INDEX: 24.41 KG/M2 | DIASTOLIC BLOOD PRESSURE: 70 MMHG | HEIGHT: 70 IN | OXYGEN SATURATION: 98 % | TEMPERATURE: 97.7 F

## 2020-01-27 DIAGNOSIS — G89.29 CHRONIC LEFT SHOULDER PAIN: ICD-10-CM

## 2020-01-27 DIAGNOSIS — E06.3 HYPOTHYROIDISM DUE TO HASHIMOTO'S THYROIDITIS: ICD-10-CM

## 2020-01-27 DIAGNOSIS — I10 ESSENTIAL HYPERTENSION: ICD-10-CM

## 2020-01-27 DIAGNOSIS — E78.00 PURE HYPERCHOLESTEROLEMIA: ICD-10-CM

## 2020-01-27 DIAGNOSIS — M35.00 SJOGREN'S SYNDROME, WITH UNSPECIFIED ORGAN INVOLVEMENT (HCC): ICD-10-CM

## 2020-01-27 DIAGNOSIS — S29.012A STRAIN OF THORACIC PARASPINAL MUSCLES EXCLUDING T1 AND T2 LEVELS, INITIAL ENCOUNTER: ICD-10-CM

## 2020-01-27 DIAGNOSIS — K21.9 GASTROESOPHAGEAL REFLUX DISEASE WITHOUT ESOPHAGITIS: ICD-10-CM

## 2020-01-27 DIAGNOSIS — F51.01 PRIMARY INSOMNIA: ICD-10-CM

## 2020-01-27 DIAGNOSIS — E03.8 HYPOTHYROIDISM DUE TO HASHIMOTO'S THYROIDITIS: ICD-10-CM

## 2020-01-27 DIAGNOSIS — J30.2 SEASONAL ALLERGIES: ICD-10-CM

## 2020-01-27 DIAGNOSIS — R10.32 LEFT LOWER QUADRANT ABDOMINAL PAIN: ICD-10-CM

## 2020-01-27 DIAGNOSIS — M25.512 CHRONIC LEFT SHOULDER PAIN: ICD-10-CM

## 2020-01-27 PROBLEM — G47.01 INSOMNIA DUE TO MEDICAL CONDITION: Status: ACTIVE | Noted: 2020-01-27

## 2020-01-27 PROBLEM — R10.10 PAIN OF UPPER ABDOMEN: Status: ACTIVE | Noted: 2020-01-27

## 2020-01-27 PROCEDURE — 99214 OFFICE O/P EST MOD 30 MIN: CPT | Performed by: FAMILY MEDICINE

## 2020-01-27 RX ORDER — LEVOTHYROXINE SODIUM 0.12 MG/1
125 TABLET ORAL
Qty: 90 TAB | Refills: 3 | Status: SHIPPED | OUTPATIENT
Start: 2020-01-27 | End: 2020-03-17 | Stop reason: SDUPTHER

## 2020-01-27 RX ORDER — OMEPRAZOLE 40 MG/1
40 CAPSULE, DELAYED RELEASE ORAL DAILY
Qty: 90 CAP | Refills: 3 | Status: SHIPPED | OUTPATIENT
Start: 2020-01-27 | End: 2020-09-04 | Stop reason: SDUPTHER

## 2020-01-27 RX ORDER — ZOLPIDEM TARTRATE 10 MG/1
10 TABLET ORAL NIGHTLY PRN
Qty: 30 TAB | Refills: 5 | Status: SHIPPED
Start: 2020-01-27 | End: 2020-02-26

## 2020-01-27 RX ORDER — LOSARTAN POTASSIUM 50 MG/1
50 TABLET ORAL DAILY
Qty: 90 TAB | Refills: 3 | Status: SHIPPED
Start: 2020-01-27 | End: 2020-07-21

## 2020-01-27 RX ORDER — ATORVASTATIN CALCIUM 20 MG/1
20 TABLET, FILM COATED ORAL DAILY
Qty: 90 TAB | Refills: 3 | Status: SHIPPED | OUTPATIENT
Start: 2020-01-27 | End: 2020-03-17 | Stop reason: SDUPTHER

## 2020-01-27 RX ORDER — TEMAZEPAM 15 MG/1
15 CAPSULE ORAL NIGHTLY PRN
Qty: 30 CAP | Refills: 5 | Status: SHIPPED
Start: 2020-01-27 | End: 2020-03-17 | Stop reason: SDUPTHER

## 2020-01-27 RX ORDER — LIOTHYRONINE SODIUM 5 UG/1
5 TABLET ORAL DAILY
Qty: 90 TAB | Refills: 3 | Status: SHIPPED | OUTPATIENT
Start: 2020-01-27 | End: 2020-03-17 | Stop reason: SDUPTHER

## 2020-01-27 RX ORDER — ATORVASTATIN CALCIUM 10 MG/1
10 TABLET, FILM COATED ORAL DAILY
Qty: 90 TAB | Refills: 3 | Status: SHIPPED | OUTPATIENT
Start: 2020-01-27 | End: 2020-03-17 | Stop reason: SDUPTHER

## 2020-01-27 SDOH — HEALTH STABILITY: MENTAL HEALTH: HOW OFTEN DO YOU HAVE A DRINK CONTAINING ALCOHOL?: NEVER

## 2020-01-27 ASSESSMENT — PATIENT HEALTH QUESTIONNAIRE - PHQ9: CLINICAL INTERPRETATION OF PHQ2 SCORE: 0

## 2020-01-27 NOTE — PROGRESS NOTES
Martin Briceño is a 74 y.o. male new patient here for abdominal pain and shoulder pain  HPI:  Moved from [facility] to [place]. See's his brother as patient.    Primary insomnia  Getting to sleep has always been a problem and would sleep all night. Now sleeping only about 3-5 am.  Takes Ambien 10 mg, quarter tablet, and temazepam 15 mg nightly, plus THC.    Pure hypercholesterolemia  Patient is tolerating atorvastatin 30 mg daily. History of 3 TIA's, 9622-4459.    Essential hypertension  Taking losartan 50 mg tablet, since the TIA's in 2010.    Sjogren's syndrome (HCC)  Patient has been having seronegative Sjogren's.  Currently on Plaquenil 200 mg twice daily. Being followed by Rheumatologist.    Hypothyroidism  Patient is tolerating levothyroxine 125 mcg daily and cytomel 5 mcg daily.    Gastroesophageal reflux disease without esophagitis  Has been taking omeprazole 40 mg once daily for years that controls GERD symptoms. Has to avoid spice, onions, acidic foods, garlic.    Seasonal allergies  Has done allergy shots in the past, having recurrent fall allergies, typically has fall and spring allergies.    Left lower quadrant abdominal pain  For the last 2 months has been having sharp pain after eating any food, in left sided pain. Has reduced food and lost 5 pounds as a result. There is also associated burning pain in upper epigastric region.    Chronic left shoulder pain  History of rotator cuff injury and bicep repair in right shoulder a couple years ago.    Now left shoulder is bothersome for the last 2 months. Pain is significant and radiating down his right arm. Lateral raise makes symptoms worse. Was told by previous ortho that he may need surgery eventually.    Current medicines (including changes today)  Current Outpatient Medications   Medication Sig Dispense Refill   • losartan (COZAAR) 50 MG Tab Take 1 Tab by mouth every day. 90 Tab 3   • atorvastatin (LIPITOR) 20 MG Tab Take 1 Tab by mouth every day. 90 Tab 3   •  levothyroxine (SYNTHROID) 125 MCG Tab Take 1 Tab by mouth Every morning on an empty stomach. 90 Tab 3   • liothyronine (CYTOMEL) 5 MCG Tab Take 1 Tab by mouth every day. 90 Tab 3   • atorvastatin (LIPITOR) 10 MG Tab Take 1 Tab by mouth every day. 90 Tab 3   • omeprazole (PRILOSEC) 40 MG delayed-release capsule Take 1 Cap by mouth every day. 90 Cap 3   • temazepam (RESTORIL) 15 MG Cap Take 1 Cap by mouth at bedtime as needed for Sleep for up to 30 days. 30 Cap 5   • zolpidem (AMBIEN) 10 MG Tab Take 1 Tab by mouth at bedtime as needed for Sleep for up to 30 days. 30 Tab 5   • cycloSPORINE, PF, (CEQUA) 0.09 % Solution by Ophthalmic route.     • hydroxychloroquine (PLAQUENIL) 200 MG Tab 1 tab p.o. twice daily 180 Tab 0   • Calcium Carbonate 600 MG Tab Take 1,800 mg by mouth.     • Ascorbic Acid (VITAMIN C) 1000 MG Tab Take 1,000 mg by mouth.     • Lifitegrast (XIIDRA) 5 % Solution      • tadalafil (CIALIS) 10 MG tablet Take 10 mg by mouth as needed for Erectile Dysfunction.     • Bepotastine Besilate (BEPREVE) 1.5 % Solution by Ophthalmic route.     • aspirin-dipyridamole (AGGRENOX)  MG CAPSULE SR 12 HR Take 1 Cap by mouth 2 times a day.     • Cyanocobalamin (B-12) 1000 MCG Cap Take  by mouth.     • cholecalciferol (D-3-5) 5000 UNIT Cap Take 6,000 Units by mouth every day.     • Coenzyme Q10 (COQ10) 400 MG Cap Take  by mouth.     • EPINEPHrine (EPIPEN) 0.3 MG/0.3ML Solution Auto-injector solution for injection 0.3 mg by Intramuscular route.     • CENTRUM PO TABS 1 po daily     • fluticasone (FLONASE) 50 MCG/ACT nasal spray Spray 1 Spray in nose every day.     • LOTEMAX 0.5 % Gel        No current facility-administered medications for this visit.      He  has a past medical history of Heart burn, Indigestion, and Unspecified disorder of thyroid.  He  has a past surgical history that includes other abdominal surgery; gastroscopy with biopsy (6/11/08); egd w/endoscopic ultrasound (6/11/08); and thyroidectomy  "().  Social History     Tobacco Use   • Smoking status: Former Smoker     Packs/day: 1.00     Years: 30.00     Pack years: 30.00     Types: Cigarettes     Start date:      Last attempt to quit:      Years since quittin.0   • Smokeless tobacco: Never Used   Substance Use Topics   • Alcohol use: Not Currently     Frequency: Never   • Drug use: Yes     Types: Marijuana     Social History     Patient does not qualify to have social determinant information on file (likely too young).   Social History Narrative   • Not on file     Family History   Problem Relation Age of Onset   • COPD Mother    • Heart Disease Father         A. Fib     Family Status   Relation Name Status   • Mo     • Fa  Alive   • Bro  Alive   • Child  Alive         ROS  No chest pain  All other systems reviewed and are negative     Objective:     /70 (BP Location: Right arm, Patient Position: Sitting)   Pulse 64   Temp 36.5 °C (97.7 °F) (Temporal)   Ht 1.778 m (5' 10\")   Wt 77.3 kg (170 lb 8 oz)   SpO2 98%  Body mass index is 24.46 kg/m².  Physical Exam:    Constitutional: Alert, no distress.  Skin: Warm, dry, good turgor, no rashes in visible areas.  Eye: Equal, round and reactive, conjunctiva clear, lids normal.  ENMT: Lips without lesions, good dentition, oropharynx clear. TMs pearly gray bilaterally.  Neck: Trachea midline, no masses, no thyromegaly. No cervical or supraclavicular lymphadenopathy.  Respiratory: Unlabored respiratory effort, lungs clear to auscultation, no wheezes, no ronchi.  Cardiovascular: Normal S1, S2, no murmur, no edema.  Abdomen: Soft, tender in epigastric region and minimal tenderness in left lower quadrant.  Psych: Alert and oriented x3, normal affect and mood.        Assessment and Plan:   The following treatment plan was discussed    1. Essential hypertension  Controlled. Continue losartan.    2. Pure hypercholesterolemia  Controlled. Continue atorvastatin. Check labs and message with " results.  - Lipid Profile; Future    3. Primary insomnia  Stable. Continue current medications.  - temazepam (RESTORIL) 15 MG Cap; Take 1 Cap by mouth at bedtime as needed for Sleep for up to 30 days.  Dispense: 30 Cap; Refill: 5  - zolpidem (AMBIEN) 10 MG Tab; Take 1 Tab by mouth at bedtime as needed for Sleep for up to 30 days.  Dispense: 30 Tab; Refill: 5    4. Sjogren's syndrome, with unspecified organ involvement (HCC)  Stable. Continue plaquenil.    5. Hypothyroidism due to Hashimoto's thyroiditis  Continue current medications. Check labs and call with results.  - TSH; Future  - FREE THYROXINE; Future  - T3 FREE; Future    6. Gastroesophageal reflux disease without esophagitis  Controlled. Continue omeprazole. Discontinue THC to see if that improves.  - REFERRAL TO GASTROENTEROLOGY    7. Seasonal allergies  Monitor. May need to go back to seasonal allergies.    8. Left lower quadrant abdominal pain  Check CT scan to rule out diverticulitis.  - CT-ABDOMEN-PELVIS WITH  - REFERRAL TO GASTROENTEROLOGY    9. Chronic left shoulder pain  Referral to ortho to eval and treatment.  - REFERRAL TO ORTHOPEDICS    10. Strain of thoracic paraspinal muscles excluding T1 and T2 levels, initial encounter  Improving slowly. Referral to PT.  - REFERRAL TO PHYSICAL THERAPY Reason for Therapy: Eval/Treat/Report      Records requested.  Followup: Return if symptoms worsen or fail to improve.

## 2020-01-27 NOTE — ASSESSMENT & PLAN NOTE
History of rotator cuff injury and bicep repair in right shoulder a couple years ago.    Now left shoulder is bothersome for the last 2 months. Pain is significant and radiating down his right arm. Lateral raise makes symptoms worse. Was told by previous ortho that he may need surgery eventually.

## 2020-01-27 NOTE — ASSESSMENT & PLAN NOTE
Has been taking omeprazole 40 mg once daily for years that controls GERD symptoms. Has to avoid spice, onions, acidic foods, garlic.

## 2020-01-27 NOTE — ASSESSMENT & PLAN NOTE
Patient has been having seronegative Sjogren's.  Currently on Plaquenil 200 mg twice daily. Being followed by Rheumatologist.

## 2020-01-27 NOTE — ASSESSMENT & PLAN NOTE
For the last 2 months has been having sharp pain after eating any food, in left sided pain. Has reduced food and lost 5 pounds as a result. There is also associated burning pain in upper epigastric region.

## 2020-01-27 NOTE — ASSESSMENT & PLAN NOTE
Has done allergy shots in the past, having recurrent fall allergies, typically has fall and spring allergies.

## 2020-01-27 NOTE — ASSESSMENT & PLAN NOTE
Getting to sleep has always been a problem and would sleep all night. Now sleeping only about 3-5 am.  Takes Ambien 10 mg, quarter tablet, and temazepam 15 mg nightly, plus THC.

## 2020-01-28 ENCOUNTER — TELEPHONE (OUTPATIENT)
Dept: MEDICAL GROUP | Facility: MEDICAL CENTER | Age: 75
End: 2020-01-28

## 2020-01-28 ENCOUNTER — HOSPITAL ENCOUNTER (OUTPATIENT)
Dept: LAB | Facility: MEDICAL CENTER | Age: 75
End: 2020-01-28
Attending: FAMILY MEDICINE
Payer: MEDICARE

## 2020-01-28 DIAGNOSIS — E78.00 PURE HYPERCHOLESTEROLEMIA: ICD-10-CM

## 2020-01-28 DIAGNOSIS — E06.3 HYPOTHYROIDISM DUE TO HASHIMOTO'S THYROIDITIS: ICD-10-CM

## 2020-01-28 DIAGNOSIS — I10 ESSENTIAL HYPERTENSION: ICD-10-CM

## 2020-01-28 DIAGNOSIS — E03.8 HYPOTHYROIDISM DUE TO HASHIMOTO'S THYROIDITIS: ICD-10-CM

## 2020-01-28 LAB
CHOLEST SERPL-MCNC: 94 MG/DL (ref 100–199)
FASTING STATUS PATIENT QL REPORTED: NORMAL
HDLC SERPL-MCNC: 42 MG/DL
LDLC SERPL CALC-MCNC: 37 MG/DL
T3FREE SERPL-MCNC: 2.83 PG/ML (ref 2.4–4.2)
T4 FREE SERPL-MCNC: 0.81 NG/DL (ref 0.53–1.43)
TRIGL SERPL-MCNC: 73 MG/DL (ref 0–149)
TSH SERPL DL<=0.005 MIU/L-ACNC: 2.4 UIU/ML (ref 0.38–5.33)

## 2020-01-28 PROCEDURE — 84481 FREE ASSAY (FT-3): CPT

## 2020-01-28 PROCEDURE — 36415 COLL VENOUS BLD VENIPUNCTURE: CPT

## 2020-01-28 PROCEDURE — 84439 ASSAY OF FREE THYROXINE: CPT

## 2020-01-28 PROCEDURE — 80061 LIPID PANEL: CPT

## 2020-01-28 PROCEDURE — 84443 ASSAY THYROID STIM HORMONE: CPT

## 2020-01-28 NOTE — TELEPHONE ENCOUNTER
Patient has scheduled CT of abdomen and pelvis. Patient needs labs to check creatinine and BUN. Please order labs.

## 2020-01-29 ENCOUNTER — TELEPHONE (OUTPATIENT)
Dept: MEDICAL GROUP | Facility: MEDICAL CENTER | Age: 75
End: 2020-01-29

## 2020-01-29 NOTE — TELEPHONE ENCOUNTER
----- Message from Vasu Zamudio M.D. sent at 1/29/2020  7:16 AM PST -----  Please notify patient that thyroid function and cholesterol are both well controlled.  Continue current medications as prescribed.  We should recheck labs in 6 to 12 months.  Vasu Zamudio M.D.

## 2020-01-30 ENCOUNTER — HOSPITAL ENCOUNTER (OUTPATIENT)
Dept: LAB | Facility: MEDICAL CENTER | Age: 75
End: 2020-01-30
Attending: FAMILY MEDICINE
Payer: MEDICARE

## 2020-01-30 DIAGNOSIS — I10 ESSENTIAL HYPERTENSION: ICD-10-CM

## 2020-01-30 LAB
ANION GAP SERPL CALC-SCNC: 9 MMOL/L (ref 0–11.9)
BUN SERPL-MCNC: 18 MG/DL (ref 8–22)
CALCIUM SERPL-MCNC: 9.2 MG/DL (ref 8.5–10.5)
CHLORIDE SERPL-SCNC: 103 MMOL/L (ref 96–112)
CO2 SERPL-SCNC: 25 MMOL/L (ref 20–33)
CREAT SERPL-MCNC: 0.95 MG/DL (ref 0.5–1.4)
GLUCOSE SERPL-MCNC: 89 MG/DL (ref 65–99)
POTASSIUM SERPL-SCNC: 3.7 MMOL/L (ref 3.6–5.5)
SODIUM SERPL-SCNC: 137 MMOL/L (ref 135–145)

## 2020-01-30 PROCEDURE — 80048 BASIC METABOLIC PNL TOTAL CA: CPT

## 2020-01-30 PROCEDURE — 36415 COLL VENOUS BLD VENIPUNCTURE: CPT

## 2020-02-03 ENCOUNTER — HOSPITAL ENCOUNTER (OUTPATIENT)
Dept: RADIOLOGY | Facility: MEDICAL CENTER | Age: 75
End: 2020-02-03
Attending: FAMILY MEDICINE
Payer: MEDICARE

## 2020-02-03 PROCEDURE — 700117 HCHG RX CONTRAST REV CODE 255: Performed by: FAMILY MEDICINE

## 2020-02-03 PROCEDURE — 74177 CT ABD & PELVIS W/CONTRAST: CPT

## 2020-02-03 RX ADMIN — IOHEXOL 100 ML: 350 INJECTION, SOLUTION INTRAVENOUS at 11:15

## 2020-02-03 RX ADMIN — IOHEXOL 25 ML: 240 INJECTION, SOLUTION INTRATHECAL; INTRAVASCULAR; INTRAVENOUS; ORAL at 11:15

## 2020-02-19 RX ORDER — HYDROXYCHLOROQUINE SULFATE 200 MG/1
TABLET, FILM COATED ORAL
Qty: 180 TAB | Refills: 0 | OUTPATIENT
Start: 2020-02-19

## 2020-02-19 NOTE — TELEPHONE ENCOUNTER
Received request via: Patient  [facility] labs, will get our labs done in April before his appt w you  Was the patient seen in the last year in this department? Yes    Does the patient have an active prescription (recently filled or refills available) for medication(s) requested? No

## 2020-03-17 ENCOUNTER — PATIENT MESSAGE (OUTPATIENT)
Dept: MEDICAL GROUP | Facility: MEDICAL CENTER | Age: 75
End: 2020-03-17

## 2020-03-17 DIAGNOSIS — F51.01 PRIMARY INSOMNIA: ICD-10-CM

## 2020-03-17 RX ORDER — ASPIRIN AND DIPYRIDAMOLE 25; 200 MG/1; MG/1
1 CAPSULE, EXTENDED RELEASE ORAL 2 TIMES DAILY
Qty: 180 CAP | Refills: 3 | Status: ON HOLD
Start: 2020-03-17 | End: 2020-09-03

## 2020-03-17 RX ORDER — LIOTHYRONINE SODIUM 5 UG/1
5 TABLET ORAL DAILY
Qty: 90 TAB | Refills: 3 | Status: SHIPPED | OUTPATIENT
Start: 2020-03-17 | End: 2020-09-04 | Stop reason: SDUPTHER

## 2020-03-17 RX ORDER — ATORVASTATIN CALCIUM 20 MG/1
20 TABLET, FILM COATED ORAL DAILY
Qty: 90 TAB | Refills: 3 | Status: SHIPPED | OUTPATIENT
Start: 2020-03-17 | End: 2020-09-04 | Stop reason: SDUPTHER

## 2020-03-17 RX ORDER — ATORVASTATIN CALCIUM 10 MG/1
10 TABLET, FILM COATED ORAL DAILY
Qty: 90 TAB | Refills: 3 | Status: SHIPPED
Start: 2020-03-17 | End: 2020-05-29

## 2020-03-17 RX ORDER — TEMAZEPAM 15 MG/1
15 CAPSULE ORAL NIGHTLY PRN
Qty: 90 CAP | Refills: 1 | Status: SHIPPED
Start: 2020-03-17 | End: 2020-06-15

## 2020-03-17 RX ORDER — LEVOTHYROXINE SODIUM 0.12 MG/1
125 TABLET ORAL
Qty: 90 TAB | Refills: 3 | Status: SHIPPED | OUTPATIENT
Start: 2020-03-17 | End: 2020-09-04 | Stop reason: SDUPTHER

## 2020-03-17 NOTE — TELEPHONE ENCOUNTER
From: Martin Briceño  To: Vasu Zamudio M.D.  Sent: 3/17/2020 9:41 AM PDT  Subject: Prescription Question    Dr. Zamudio,  When I met you for my intake a couple of months ago, I provided my list of medications. Most still on reorder from my physician in [facility]. (JACOB Diehl).  Unfortunately, some can no longer be refilled. (As I recall you submitted them to [facility].)  The following show No Refill: Atorvastatin 20mg -90 days; Atorvastatin 10mg - 90 days; Levothyroxine 125 mcg; Tenazeoam 15mg - 90 with 2 refills.    While I currently have enough Aggrenox and Liothyronine to last for another month or two, refills will  3/31.    I am 75, and while I have had no fever, I have had a low grade headache for about a week and minor sore throat off and on. So I am pretty well in lock down.    Do I need to come in to refill?    Thank you,  Tex MEJIA

## 2020-03-21 ENCOUNTER — HOSPITAL ENCOUNTER (EMERGENCY)
Facility: MEDICAL CENTER | Age: 75
End: 2020-03-21
Attending: EMERGENCY MEDICINE
Payer: MEDICARE

## 2020-03-21 VITALS
TEMPERATURE: 98.4 F | HEIGHT: 70 IN | HEART RATE: 69 BPM | WEIGHT: 171.3 LBS | RESPIRATION RATE: 18 BRPM | DIASTOLIC BLOOD PRESSURE: 83 MMHG | BODY MASS INDEX: 24.52 KG/M2 | OXYGEN SATURATION: 96 % | SYSTOLIC BLOOD PRESSURE: 142 MMHG

## 2020-03-21 DIAGNOSIS — B34.9 VIRAL SYNDROME: ICD-10-CM

## 2020-03-21 LAB
S PYO AG THROAT QL: NORMAL
S PYO DNA SPEC NAA+PROBE: NOT DETECTED
SIGNIFICANT IND 70042: NORMAL
SITE SITE: NORMAL
SOURCE SOURCE: NORMAL

## 2020-03-21 PROCEDURE — 87651 STREP A DNA AMP PROBE: CPT

## 2020-03-21 PROCEDURE — 87081 CULTURE SCREEN ONLY: CPT

## 2020-03-21 PROCEDURE — 99283 EMERGENCY DEPT VISIT LOW MDM: CPT

## 2020-03-21 PROCEDURE — 87880 STREP A ASSAY W/OPTIC: CPT

## 2020-03-21 ASSESSMENT — ENCOUNTER SYMPTOMS
SORE THROAT: 1
ABDOMINAL PAIN: 0
FEVER: 0
COUGH: 1
SHORTNESS OF BREATH: 0
WHEEZING: 0
NECK PAIN: 0
VOMITING: 0
NAUSEA: 0
HEADACHES: 0
BACK PAIN: 0

## 2020-03-21 NOTE — ED PROVIDER NOTES
ED Provider Note   3/21/2020  4:54 PM    Means of Arrival: Walk In  History obtained by: patient  Limitations: none    CHIEF COMPLAINT  Chief Complaint   Patient presents with   • Cough     dry cough starting today    • Fever     99.5 f measured at home on    • Sore Throat     x 10 days along with KNUTSON       HPI  Martin Briceño is a 74 y.o. male with history of Sjogren's disease takes Plaquenil presents with concerns of sore throat for the past 10 days.  Over the past 24 hours he is now developed cough and a low-grade fever.  No known sick contacts.  Last travel was in [facility] 1 week ago.  No nausea or vomiting.  No other GI symptoms.  No chest pain.  He says sore throat feels very dry, scratchy pain worse with swallowing. Cough is dry and infrequent.     REVIEW OF SYSTEMS  Review of Systems   Constitutional: Negative for fever.   HENT: Positive for sore throat. Negative for congestion.    Respiratory: Positive for cough. Negative for shortness of breath and wheezing.    Cardiovascular: Negative for chest pain.   Gastrointestinal: Negative for abdominal pain, nausea and vomiting.   Musculoskeletal: Negative for back pain and neck pain.   Neurological: Negative for headaches.   All other systems reviewed and are negative.    See HPI for further details.     PAST MEDICAL HISTORY   has a past medical history of Heart burn, Indigestion, Stroke (HCC), and Unspecified disorder of thyroid.    SOCIAL HISTORY  Social History     Tobacco Use   • Smoking status: Former Smoker     Packs/day: 1.00     Years: 30.00     Pack years: 30.00     Types: Cigarettes     Start date:      Last attempt to quit:      Years since quittin.2   • Smokeless tobacco: Never Used   Substance and Sexual Activity   • Alcohol use: Not Currently     Frequency: Never   • Drug use: Yes     Types: Marijuana, Inhaled     Comment: estela   • Sexual activity: Not Currently       SURGICAL HISTORY   has a past surgical history that  "includes other abdominal surgery; gastroscopy with biopsy (6/11/08); egd w/endoscopic ultrasound (6/11/08); and thyroidectomy (2003).    CURRENT MEDICATIONS  Home Medications    **Home medications have not yet been reviewed for this encounter**         ALLERGIES  Allergies   Allergen Reactions   • Mirtazapine Unspecified     Keeps him up        PHYSICAL EXAM  VITAL SIGNS: /92   Pulse 63   Temp 36.9 °C (98.4 °F) (Temporal)   Resp 16   Ht 1.778 m (5' 10\")   Wt 77.7 kg (171 lb 4.8 oz)   SpO2 95%   BMI 24.58 kg/m²    Pulse ox interpretation: I interpret this pulse ox as normal.  Constitutional: Alert in no apparent distress.  HENT: Normocephalic, Atraumatic, Bilateral external ears normal. Nose normal. Erythema in posterior pharynx with mild edema.   Eyes: Pupils are equal. Conjunctiva normal, non-icteric.   Heart: Regular rate and rythm, no murmurs.    Lungs: No respiratory distress, regular respirations. Clear to auscultation bilaterally.  Abdomen: Normal appearance, nondistended, nontender.  Skin: Warm, Dry, No erythema, No rash.   Neurologic: Alert, Grossly non-focal. No slurred speech. Moving extremities normally.   MSK: Normal range of motion without limitations or pain.   Psychiatric: Affect normal, Judgment normal, Mood normal, Appears appropriate and not intoxicated.   Physical Exam      COURSE & MEDICAL DECISION MAKING  Pertinent Labs & Imaging studies reviewed. (See chart for details)    4:54 PM This is an emergent evaluation of a 74 y.o., male who presents with concerns cough and sore throat.  Symptom onset 10 days ago.  Last travel was to [facility] 1 week ago.  Otherwise no known sick contacts.  Different diagnosis includes viral syndrome, strep pharyngitis.  He does have erythematous posterior pharynx with mild edema and he will be tested for strep pharyngitis.  We also discussed possibility of him having Covid 19, I think this is definitely in the normal possibilities.  He is not severely " ill with respiratory distress or abnormal lung sounds.  He has normal oxygen levels.  Plan will be if he has strep pharyngitis to be treated with antibiotics otherwise he needs to go to self-isolation, contact the health department to discuss testing.  We have limited testing and it is being reserved for hospitalized patients.    7:04 PM  Negative strep test.  He was given discharge instructions on self distancing and information on [place] 19.  He needs to come back if he is unable swallow, has significant trouble breathing.    During all my interactions with him he was wearing a mask and I was protecting myself with goggles, N95 mask, gloves and used hand hygiene techniques.  Except for brief physical evaluation I also was 6 feet away during interview.     The patient will return for worsening symptoms and is stable at the time of discharge. The patient verbalizes understanding. Guidance was provided on appropriate use of medications including driving under the influence, overdose, and side effects.     FINAL IMPRESSION  1. Viral syndrome               Electronically signed by: Antoni Magdaleno II, M.D., 3/21/2020 4:54 PM

## 2020-03-21 NOTE — ED TRIAGE NOTES
Chief Complaint   Patient presents with   • Cough     dry cough starting today    • Fever     99.5 f measured at home on 19th   • Sore Throat     x 10 days along with KNUTSON   Pt meets screening criteria 1 for covid as he has cough and traveled to [facility] 1 week ago.

## 2020-03-22 NOTE — ED NOTES
Called lab regarding strep test  Results, pcr order can take up to two hours, per ERP, collected rapid strep.

## 2020-03-22 NOTE — DISCHARGE INSTRUCTIONS
Your strep test was negative.       You likely have a viral illness and may have COVID-19. At this point we do not have testing available in the outpatient setting here in the emergency department for COVID-19. We also have limited testing for flu and other viral illnesses due to national shortages.  Therefore, COVID-19 is not ruled out and you will need to remain in home quarantine until all three of the following are true:  You are 7 days from symptom onset  your symptoms are improving   you have been fever free for at least 72hours.  Testing is only occurring through the health district at this point; you may call them at [phone].  After a phone triage process you may or may not be tested.  If you develop significant shortness of breath, meaning that it is difficult for you to walk even short distances without having to stop and catch your breath, or you become severely dizzy and this is persistent then please return to the emergency department.

## 2020-03-22 NOTE — ED NOTES
D/c instructions went over with pt and verbalized understanding, ambulated out in no acute distress.

## 2020-03-24 LAB
S PYO SPEC QL CULT: NORMAL
SIGNIFICANT IND 70042: NORMAL
SITE SITE: NORMAL
SOURCE SOURCE: NORMAL

## 2020-03-31 PROBLEM — D63.8 ANEMIA OF CHRONIC DISEASE: Status: ACTIVE | Noted: 2020-03-31

## 2020-04-16 ENCOUNTER — TELEMEDICINE (OUTPATIENT)
Dept: MEDICAL GROUP | Facility: MEDICAL CENTER | Age: 75
End: 2020-04-16
Payer: MEDICARE

## 2020-04-16 VITALS
DIASTOLIC BLOOD PRESSURE: 87 MMHG | HEIGHT: 70 IN | BODY MASS INDEX: 23.77 KG/M2 | SYSTOLIC BLOOD PRESSURE: 134 MMHG | TEMPERATURE: 97.7 F | WEIGHT: 166 LBS

## 2020-04-16 DIAGNOSIS — J01.90 ACUTE NON-RECURRENT SINUSITIS, UNSPECIFIED LOCATION: ICD-10-CM

## 2020-04-16 PROCEDURE — 99214 OFFICE O/P EST MOD 30 MIN: CPT | Mod: 95,CR | Performed by: FAMILY MEDICINE

## 2020-04-16 RX ORDER — ALBUTEROL SULFATE 90 UG/1
2 AEROSOL, METERED RESPIRATORY (INHALATION) EVERY 4 HOURS PRN
Qty: 1 INHALER | Refills: 2 | Status: SHIPPED | OUTPATIENT
Start: 2020-04-16 | End: 2020-09-04 | Stop reason: SDUPTHER

## 2020-04-16 RX ORDER — AMOXICILLIN 875 MG/1
875 TABLET, COATED ORAL 2 TIMES DAILY
Qty: 20 TAB | Refills: 0 | Status: SHIPPED | OUTPATIENT
Start: 2020-04-16 | End: 2020-04-26

## 2020-04-16 NOTE — PROGRESS NOTES
Telemedicine Visit: Established Patient     This encounter was conducted via Zoom .   Verbal consent was obtained. Patient's identity was verified.    Subjective:   CC: sinus problems  Martin Briceño is a 74 y.o. male presenting for evaluation and management of:    Started 1 month ago because he was having upper chest congestion and tmax of 100, sore throat that came and went, cough that came and went. He went to the ER 3 weeks ago and was advised that it did not sound like COVID-19. He has been having consistent and more significant fatigue.  2 weeks ago he started to feel better, so he spent more time up and doing more, but fatigue hit him the next 1-2 days.  Today his most persistent symptoms is post nasal drip, cough and overall weakness. Post nasal drip is better in the afternoon but more present in the morning, although this morning he is doing better.  He takes Plaquenil for Sjogren's.      ROS   Denies any recent fevers or chills. No chest pains or shortness of breath.     Allergies   Allergen Reactions   • Mirtazapine Unspecified     Keeps him up        Current medicines (including changes today)  Current Outpatient Medications   Medication Sig Dispense Refill   • hydroxychloroquine (PLAQUENIL) 200 MG Tab TAKE 1 TABLET BY MOUTH TWICE A DAY 60 Tab 0   • atorvastatin (LIPITOR) 20 MG Tab Take 1 Tab by mouth every day. 90 Tab 3   • atorvastatin (LIPITOR) 10 MG Tab Take 1 Tab by mouth every day. 90 Tab 3   • levothyroxine (SYNTHROID) 125 MCG Tab Take 1 Tab by mouth Every morning on an empty stomach. 90 Tab 3   • liothyronine (CYTOMEL) 5 MCG Tab Take 1 Tab by mouth every day. 90 Tab 3   • temazepam (RESTORIL) 15 MG Cap Take 1 Cap by mouth at bedtime as needed for Sleep for up to 90 days. 90 Cap 1   • aspirin-dipyridamole (AGGRENOX)  MG CAPSULE SR 12 HR Take 1 Cap by mouth 2 times a day. 180 Cap 3   • losartan (COZAAR) 50 MG Tab Take 1 Tab by mouth every day. 90 Tab 3   • omeprazole (PRILOSEC) 40 MG  delayed-release capsule Take 1 Cap by mouth every day. 90 Cap 3   • cycloSPORINE, PF, (CEQUA) 0.09 % Solution by Ophthalmic route.     • Calcium Carbonate 600 MG Tab Take 1,800 mg by mouth.     • Ascorbic Acid (VITAMIN C) 1000 MG Tab Take 1,000 mg by mouth.     • Lifitegrast (XIIDRA) 5 % Solution      • fluticasone (FLONASE) 50 MCG/ACT nasal spray Spray 1 Spray in nose every day.     • tadalafil (CIALIS) 10 MG tablet Take 10 mg by mouth as needed for Erectile Dysfunction.     • Bepotastine Besilate (BEPREVE) 1.5 % Solution by Ophthalmic route.     • Cyanocobalamin (B-12) 1000 MCG Cap Take  by mouth.     • cholecalciferol (D-3-5) 5000 UNIT Cap Take 6,000 Units by mouth every day.     • Coenzyme Q10 (COQ10) 400 MG Cap Take  by mouth.     • LOTEMAX 0.5 % Gel      • CENTRUM PO TABS 1 po daily       No current facility-administered medications for this visit.        Patient Active Problem List    Diagnosis Date Noted   • Anemia of chronic disease 03/31/2020   • Essential hypertension 01/27/2020   • Pure hypercholesterolemia 01/27/2020   • Primary insomnia 01/27/2020   • Gastroesophageal reflux disease without esophagitis 01/27/2020   • Seasonal allergies 01/27/2020   • Left lower quadrant abdominal pain 01/27/2020   • Chronic left shoulder pain 01/27/2020   • Long-term use of Plaquenil 07/29/2019   • Hypothyroidism 11/05/2018   • Hyperparathyroidism (HCC) 11/05/2018   • Fatigue 11/05/2018   • Sjogren's syndrome (HCC) 09/21/2018       Family History   Problem Relation Age of Onset   • COPD Mother    • Heart Disease Father         A. Fib       He  has a past medical history of Heart burn, Indigestion, Stroke (HCC), and Unspecified disorder of thyroid.  He  has a past surgical history that includes other abdominal surgery; gastroscopy with biopsy (6/11/08); egd w/endoscopic ultrasound (6/11/08); and thyroidectomy (2003).       Objective:   Vitals obtained by patient:  /87 (BP Location: Right arm, Patient Position:  "Sitting)   Temp 36.5 °C (97.7 °F)   Ht 1.778 m (5' 10\")   Wt 75.3 kg (166 lb)     Physical Exam:  Constitutional: Alert, no distress, well-groomed.  Skin: No rashes in visible areas.  Eye: Round. Conjunctiva clear, lids normal. No icterus.   ENMT: Lips pink without lesions, good dentition, moist mucous membranes. Phonation normal.  Neck: No masses, no thyromegaly. Moves freely without pain.  CV: Pulse as reported by patient  Respiratory: Unlabored respiratory effort, no cough or audible wheeze  Psych: Alert and oriented x3, normal affect and mood.       Assessment and Plan:   The following treatment plan was discussed:     1. Acute non-recurrent sinusitis, unspecified location  New problem. Given that his symptoms are waxing and waning without clear longitudinal improvement and that he is on Plaquenil, we will do atrial of Amoxil and Flonase for possible bacterial sinusitis. If no improvement this could also be slow to improve post viral syndrome and continue Flonase and add Astelin nasal if he requires more symptoms relief.  - amoxicillin (AMOXIL) 875 MG tablet; Take 1 Tab by mouth 2 times a day for 10 days.  Dispense: 20 Tab; Refill: 0            Follow-up: Return if symptoms worsen or fail to improve.           "

## 2020-04-28 ENCOUNTER — PATIENT MESSAGE (OUTPATIENT)
Dept: MEDICAL GROUP | Facility: MEDICAL CENTER | Age: 75
End: 2020-04-28

## 2020-04-28 DIAGNOSIS — K43.9 HERNIA OF ABDOMINAL WALL: ICD-10-CM

## 2020-05-13 ENCOUNTER — OFFICE VISIT (OUTPATIENT)
Dept: MEDICAL GROUP | Facility: MEDICAL CENTER | Age: 75
End: 2020-05-13
Payer: MEDICARE

## 2020-05-13 ENCOUNTER — HOSPITAL ENCOUNTER (OUTPATIENT)
Dept: LAB | Facility: MEDICAL CENTER | Age: 75
End: 2020-05-13
Attending: FAMILY MEDICINE
Payer: MEDICARE

## 2020-05-13 VITALS
TEMPERATURE: 98.1 F | HEART RATE: 64 BPM | DIASTOLIC BLOOD PRESSURE: 60 MMHG | WEIGHT: 166 LBS | BODY MASS INDEX: 23.77 KG/M2 | SYSTOLIC BLOOD PRESSURE: 128 MMHG | HEIGHT: 70 IN | OXYGEN SATURATION: 97 %

## 2020-05-13 DIAGNOSIS — S22.070S COMPRESSION FRACTURE OF T10 VERTEBRA, SEQUELA: ICD-10-CM

## 2020-05-13 DIAGNOSIS — R53.82 CHRONIC FATIGUE: ICD-10-CM

## 2020-05-13 DIAGNOSIS — Z87.09 HISTORY OF UPPER RESPIRATORY INFECTION: ICD-10-CM

## 2020-05-13 DIAGNOSIS — J30.2 SEASONAL ALLERGIES: ICD-10-CM

## 2020-05-13 LAB
ALBUMIN SERPL BCP-MCNC: 4.5 G/DL (ref 3.2–4.9)
ALBUMIN/GLOB SERPL: 1.8 G/DL
ALP SERPL-CCNC: 53 U/L (ref 30–99)
ALT SERPL-CCNC: 15 U/L (ref 2–50)
ANION GAP SERPL CALC-SCNC: 12 MMOL/L (ref 7–16)
AST SERPL-CCNC: 20 U/L (ref 12–45)
BASOPHILS # BLD AUTO: 0.5 % (ref 0–1.8)
BASOPHILS # BLD: 0.04 K/UL (ref 0–0.12)
BILIRUB SERPL-MCNC: 0.5 MG/DL (ref 0.1–1.5)
BUN SERPL-MCNC: 14 MG/DL (ref 8–22)
CALCIUM SERPL-MCNC: 9.1 MG/DL (ref 8.4–10.2)
CHLORIDE SERPL-SCNC: 103 MMOL/L (ref 96–112)
CO2 SERPL-SCNC: 25 MMOL/L (ref 20–33)
CREAT SERPL-MCNC: 0.88 MG/DL (ref 0.5–1.4)
EOSINOPHIL # BLD AUTO: 0.05 K/UL (ref 0–0.51)
EOSINOPHIL NFR BLD: 0.7 % (ref 0–6.9)
ERYTHROCYTE [DISTWIDTH] IN BLOOD BY AUTOMATED COUNT: 46.5 FL (ref 35.9–50)
FASTING STATUS PATIENT QL REPORTED: NORMAL
GLOBULIN SER CALC-MCNC: 2.5 G/DL (ref 1.9–3.5)
GLUCOSE SERPL-MCNC: 89 MG/DL (ref 65–99)
HCT VFR BLD AUTO: 38.7 % (ref 42–52)
HGB BLD-MCNC: 13.3 G/DL (ref 14–18)
IMM GRANULOCYTES # BLD AUTO: 0.04 K/UL (ref 0–0.11)
IMM GRANULOCYTES NFR BLD AUTO: 0.5 % (ref 0–0.9)
LYMPHOCYTES # BLD AUTO: 1.44 K/UL (ref 1–4.8)
LYMPHOCYTES NFR BLD: 19.7 % (ref 22–41)
MCH RBC QN AUTO: 34.9 PG (ref 27–33)
MCHC RBC AUTO-ENTMCNC: 34.4 G/DL (ref 33.7–35.3)
MCV RBC AUTO: 101.6 FL (ref 81.4–97.8)
MONOCYTES # BLD AUTO: 0.78 K/UL (ref 0–0.85)
MONOCYTES NFR BLD AUTO: 10.7 % (ref 0–13.4)
NEUTROPHILS # BLD AUTO: 4.96 K/UL (ref 1.82–7.42)
NEUTROPHILS NFR BLD: 67.9 % (ref 44–72)
NRBC # BLD AUTO: 0 K/UL
NRBC BLD-RTO: 0 /100 WBC
PLATELET # BLD AUTO: 130 K/UL (ref 164–446)
PMV BLD AUTO: 8 FL (ref 9–12.9)
POTASSIUM SERPL-SCNC: 3.9 MMOL/L (ref 3.6–5.5)
PROT SERPL-MCNC: 7 G/DL (ref 6–8.2)
RBC # BLD AUTO: 3.81 M/UL (ref 4.7–6.1)
SODIUM SERPL-SCNC: 140 MMOL/L (ref 135–145)
TESTOST SERPL-MCNC: 911 NG/DL (ref 175–781)
TSH SERPL DL<=0.005 MIU/L-ACNC: 0.57 UIU/ML (ref 0.38–5.33)
VIT B12 SERPL-MCNC: 1124 PG/ML (ref 211–911)
WBC # BLD AUTO: 7.3 K/UL (ref 4.8–10.8)

## 2020-05-13 PROCEDURE — 84403 ASSAY OF TOTAL TESTOSTERONE: CPT

## 2020-05-13 PROCEDURE — 99214 OFFICE O/P EST MOD 30 MIN: CPT | Performed by: FAMILY MEDICINE

## 2020-05-13 PROCEDURE — 36415 COLL VENOUS BLD VENIPUNCTURE: CPT

## 2020-05-13 PROCEDURE — 82607 VITAMIN B-12: CPT

## 2020-05-13 PROCEDURE — 80053 COMPREHEN METABOLIC PANEL: CPT

## 2020-05-13 PROCEDURE — 84443 ASSAY THYROID STIM HORMONE: CPT

## 2020-05-13 PROCEDURE — 86769 SARS-COV-2 COVID-19 ANTIBODY: CPT

## 2020-05-13 PROCEDURE — 85025 COMPLETE CBC W/AUTO DIFF WBC: CPT

## 2020-05-13 NOTE — PROGRESS NOTES
"Subjective:   Martin Briceño is a 75 y.o. male here today for fatigue    Every morning and night he gets significant fatigue, dizziness, headache, tightness in \"bronchials\", mild sore throat, mild cough, symptoms have been present for the last 9 weeks.  He has taken albuterol inhaler, which helps slightly. OTC cough and cold medication, which helps with symptoms. Flonase when he gets nasal drainage. We tried amoxicillin but did not resolve symptoms. We discontinued Plaquenil because of current symptoms.      Current medicines (including changes today)  Current Outpatient Medications   Medication Sig Dispense Refill   • albuterol 108 (90 Base) MCG/ACT Aero Soln inhalation aerosol Inhale 2 Puffs by mouth every four hours as needed for Shortness of Breath. 1 Inhaler 2   • hydroxychloroquine (PLAQUENIL) 200 MG Tab TAKE 1 TABLET BY MOUTH TWICE A DAY 60 Tab 0   • atorvastatin (LIPITOR) 20 MG Tab Take 1 Tab by mouth every day. 90 Tab 3   • atorvastatin (LIPITOR) 10 MG Tab Take 1 Tab by mouth every day. 90 Tab 3   • levothyroxine (SYNTHROID) 125 MCG Tab Take 1 Tab by mouth Every morning on an empty stomach. 90 Tab 3   • liothyronine (CYTOMEL) 5 MCG Tab Take 1 Tab by mouth every day. 90 Tab 3   • temazepam (RESTORIL) 15 MG Cap Take 1 Cap by mouth at bedtime as needed for Sleep for up to 90 days. 90 Cap 1   • aspirin-dipyridamole (AGGRENOX)  MG CAPSULE SR 12 HR Take 1 Cap by mouth 2 times a day. 180 Cap 3   • losartan (COZAAR) 50 MG Tab Take 1 Tab by mouth every day. 90 Tab 3   • omeprazole (PRILOSEC) 40 MG delayed-release capsule Take 1 Cap by mouth every day. 90 Cap 3   • cycloSPORINE, PF, (CEQUA) 0.09 % Solution by Ophthalmic route.     • Calcium Carbonate 600 MG Tab Take 1,800 mg by mouth.     • Ascorbic Acid (VITAMIN C) 1000 MG Tab Take 1,000 mg by mouth.     • Lifitegrast (XIIDRA) 5 % Solution      • fluticasone (FLONASE) 50 MCG/ACT nasal spray Spray 1 Spray in nose every day.     • tadalafil (CIALIS) 10 MG " "tablet Take 10 mg by mouth as needed for Erectile Dysfunction.     • Bepotastine Besilate (BEPREVE) 1.5 % Solution by Ophthalmic route.     • Cyanocobalamin (B-12) 1000 MCG Cap Take  by mouth.     • cholecalciferol (D-3-5) 5000 UNIT Cap Take 6,000 Units by mouth every day.     • Coenzyme Q10 (COQ10) 400 MG Cap Take  by mouth.     • LOTEMAX 0.5 % Gel      • CENTRUM PO TABS 1 po daily       No current facility-administered medications for this visit.      He  has a past medical history of Heart burn, Indigestion, Stroke (HCC), and Unspecified disorder of thyroid.    ROS   Thin nasal drainage, no wheezing in lungs, but does get wheezing in nose.       Objective:     /60   Pulse 64   Temp 36.7 °C (98.1 °F) (Temporal)   Ht 1.778 m (5' 10\")   Wt 75.3 kg (166 lb)   SpO2 97%  Body mass index is 23.82 kg/m².   Physical Exam:  Constitutional: Alert, no distress.  Skin: Warm, dry, good turgor, no rashes in visible areas.  Eye: Equal, round and reactive, conjunctiva clear, lids normal.  ENMT: Lips without lesions, good dentition, oropharynx with visible post nasal drainage. + thin nasal drainage bilaterally.  Respiratory: Unlabored respiratory effort, lungs clear to auscultation, no wheezes, no ronchi.  Psych: Alert and oriented x3, normal affect and mood.        Assessment and Plan:   The following treatment plan was discussed    1. Chronic fatigue  Possibly related to Sjogren's.  Will check labs and message with results.  - CBC WITH DIFFERENTIAL; Future  - Comp Metabolic Panel; Future  - TSH WITH REFLEX TO FT4; Future  - TESTOSTERONE SERUM; Future  - VITAMIN B12; Future    2. Seasonal allergies  Uncontrolled we will try a week of regular Flonase twice daily.  If no improvement consider adding Astelin nasal spray.    3. Compression fracture of T10 vertebra, sequela  Still having pain, possibly because of overactivity recently.  Advised patient to follow-up with physical therapy.    4. History of upper respiratory " infection  Check COVID-19 antibodies.  - Miscellaneous Test        Followup: Return if symptoms worsen or fail to improve.

## 2020-05-14 ENCOUNTER — PATIENT MESSAGE (OUTPATIENT)
Dept: MEDICAL GROUP | Facility: MEDICAL CENTER | Age: 75
End: 2020-05-14

## 2020-05-14 DIAGNOSIS — R79.89 ELEVATED TESTOSTERONE LEVEL IN MALE: ICD-10-CM

## 2020-05-14 LAB — TEST NAME 95000: NORMAL

## 2020-05-19 ENCOUNTER — TELEPHONE (OUTPATIENT)
Dept: RHEUMATOLOGY | Facility: MEDICAL CENTER | Age: 75
End: 2020-05-19

## 2020-05-19 ENCOUNTER — HOSPITAL ENCOUNTER (OUTPATIENT)
Dept: LAB | Facility: MEDICAL CENTER | Age: 75
End: 2020-05-19
Attending: INTERNAL MEDICINE
Payer: MEDICARE

## 2020-05-19 DIAGNOSIS — E55.9 VITAMIN D DEFICIENCY: ICD-10-CM

## 2020-05-19 DIAGNOSIS — R71.8 ELEVATED MCV: ICD-10-CM

## 2020-05-19 DIAGNOSIS — E21.3 HYPERPARATHYROIDISM (HCC): ICD-10-CM

## 2020-05-19 DIAGNOSIS — Z79.899 LONG-TERM USE OF PLAQUENIL: ICD-10-CM

## 2020-05-19 DIAGNOSIS — E06.9 THYROIDITIS: ICD-10-CM

## 2020-05-19 DIAGNOSIS — M35.00 SJOGREN'S SYNDROME, WITH UNSPECIFIED ORGAN INVOLVEMENT (HCC): ICD-10-CM

## 2020-05-19 LAB
25(OH)D3 SERPL-MCNC: 59 NG/ML (ref 30–100)
ALBUMIN SERPL BCP-MCNC: 4.4 G/DL (ref 3.2–4.9)
ALBUMIN/GLOB SERPL: 1.8 G/DL
ALP SERPL-CCNC: 51 U/L (ref 30–99)
ALT SERPL-CCNC: 13 U/L (ref 2–50)
ANION GAP SERPL CALC-SCNC: 13 MMOL/L (ref 7–16)
AST SERPL-CCNC: 18 U/L (ref 12–45)
BASOPHILS # BLD AUTO: 0.7 % (ref 0–1.8)
BASOPHILS # BLD: 0.03 K/UL (ref 0–0.12)
BILIRUB SERPL-MCNC: 0.7 MG/DL (ref 0.1–1.5)
BUN SERPL-MCNC: 13 MG/DL (ref 8–22)
CALCIUM SERPL-MCNC: 8.9 MG/DL (ref 8.4–10.2)
CHLORIDE SERPL-SCNC: 102 MMOL/L (ref 96–112)
CO2 SERPL-SCNC: 26 MMOL/L (ref 20–33)
CREAT SERPL-MCNC: 0.81 MG/DL (ref 0.5–1.4)
EOSINOPHIL # BLD AUTO: 0.05 K/UL (ref 0–0.51)
EOSINOPHIL NFR BLD: 1.2 % (ref 0–6.9)
ERYTHROCYTE [DISTWIDTH] IN BLOOD BY AUTOMATED COUNT: 45.8 FL (ref 35.9–50)
ERYTHROCYTE [SEDIMENTATION RATE] IN BLOOD BY WESTERGREN METHOD: 4 MM/HOUR (ref 0–20)
FASTING STATUS PATIENT QL REPORTED: NORMAL
GLOBULIN SER CALC-MCNC: 2.5 G/DL (ref 1.9–3.5)
GLUCOSE SERPL-MCNC: 110 MG/DL (ref 65–99)
HCT VFR BLD AUTO: 38.1 % (ref 42–52)
HGB BLD-MCNC: 13.1 G/DL (ref 14–18)
IMM GRANULOCYTES # BLD AUTO: 0.02 K/UL (ref 0–0.11)
IMM GRANULOCYTES NFR BLD AUTO: 0.5 % (ref 0–0.9)
LYMPHOCYTES # BLD AUTO: 1.22 K/UL (ref 1–4.8)
LYMPHOCYTES NFR BLD: 29.8 % (ref 22–41)
MCH RBC QN AUTO: 34.7 PG (ref 27–33)
MCHC RBC AUTO-ENTMCNC: 34.4 G/DL (ref 33.7–35.3)
MCV RBC AUTO: 101.1 FL (ref 81.4–97.8)
MONOCYTES # BLD AUTO: 0.51 K/UL (ref 0–0.85)
MONOCYTES NFR BLD AUTO: 12.4 % (ref 0–13.4)
NEUTROPHILS # BLD AUTO: 2.27 K/UL (ref 1.82–7.42)
NEUTROPHILS NFR BLD: 55.4 % (ref 44–72)
NRBC # BLD AUTO: 0 K/UL
NRBC BLD-RTO: 0 /100 WBC
PLATELET # BLD AUTO: 130 K/UL (ref 164–446)
PMV BLD AUTO: 8.3 FL (ref 9–12.9)
POTASSIUM SERPL-SCNC: 3.8 MMOL/L (ref 3.6–5.5)
PROT SERPL-MCNC: 6.9 G/DL (ref 6–8.2)
RBC # BLD AUTO: 3.77 M/UL (ref 4.7–6.1)
SODIUM SERPL-SCNC: 141 MMOL/L (ref 135–145)
WBC # BLD AUTO: 4.1 K/UL (ref 4.8–10.8)

## 2020-05-19 PROCEDURE — 82306 VITAMIN D 25 HYDROXY: CPT

## 2020-05-19 PROCEDURE — 85025 COMPLETE CBC W/AUTO DIFF WBC: CPT

## 2020-05-19 PROCEDURE — 80053 COMPREHEN METABOLIC PANEL: CPT

## 2020-05-19 PROCEDURE — 36415 COLL VENOUS BLD VENIPUNCTURE: CPT

## 2020-05-19 PROCEDURE — 85652 RBC SED RATE AUTOMATED: CPT

## 2020-05-19 PROCEDURE — 86235 NUCLEAR ANTIGEN ANTIBODY: CPT

## 2020-05-19 NOTE — TELEPHONE ENCOUNTER
Please notify patient that we received the results of his blood tests and his blood test shows slightly enlarged red blood cells which could indicate a vitamin B deficiency    I have ordered labs in the computer to check his vitamin B levels, patient has had a vitamin B12 level done recently which is okay, I am going to check folic acid and vitamin B1, if patient's vitamin B levels are normal then we will refer to hematology for further evaluation  Patient does not need to be fasting

## 2020-05-20 ENCOUNTER — HOSPITAL ENCOUNTER (OUTPATIENT)
Dept: LAB | Facility: MEDICAL CENTER | Age: 75
End: 2020-05-20
Attending: INTERNAL MEDICINE
Payer: MEDICARE

## 2020-05-20 DIAGNOSIS — R71.8 ELEVATED MCV: ICD-10-CM

## 2020-05-20 LAB — FOLATE SERPL-MCNC: 9.2 NG/ML

## 2020-05-20 PROCEDURE — 36415 COLL VENOUS BLD VENIPUNCTURE: CPT

## 2020-05-20 PROCEDURE — 82746 ASSAY OF FOLIC ACID SERUM: CPT

## 2020-05-20 PROCEDURE — 84425 ASSAY OF VITAMIN B-1: CPT

## 2020-05-21 ENCOUNTER — PATIENT MESSAGE (OUTPATIENT)
Dept: MEDICAL GROUP | Facility: MEDICAL CENTER | Age: 75
End: 2020-05-21

## 2020-05-21 LAB
ENA SS-B IGG SER IA-ACNC: 0 AU/ML (ref 0–40)
SSA52 R0ENA AB IGG Q0420: 3 AU/ML (ref 0–40)
SSA60 R0ENA AB IGG Q0419: 0 AU/ML (ref 0–40)

## 2020-05-21 RX ORDER — AZELASTINE 1 MG/ML
1 SPRAY, METERED NASAL 2 TIMES DAILY
Qty: 30 ML | Refills: 2 | Status: SHIPPED
Start: 2020-05-21 | End: 2020-08-26

## 2020-05-23 LAB — VIT B1 BLD-MCNC: 93 NMOL/L (ref 70–180)

## 2020-05-27 ENCOUNTER — PATIENT MESSAGE (OUTPATIENT)
Dept: MEDICAL GROUP | Facility: MEDICAL CENTER | Age: 75
End: 2020-05-27

## 2020-05-28 ENCOUNTER — OFFICE VISIT (OUTPATIENT)
Dept: ADMISSIONS | Facility: MEDICAL CENTER | Age: 75
End: 2020-05-28
Attending: ORTHOPAEDIC SURGERY
Payer: MEDICARE

## 2020-05-28 DIAGNOSIS — Z01.812 PRE-OPERATIVE LABORATORY EXAMINATION: ICD-10-CM

## 2020-05-28 LAB — COVID ORDER STATUS COVID19: NORMAL

## 2020-05-28 PROCEDURE — C9803 HOPD COVID-19 SPEC COLLECT: HCPCS

## 2020-05-29 DIAGNOSIS — Z01.810 PRE-OPERATIVE CARDIOVASCULAR EXAMINATION: ICD-10-CM

## 2020-05-29 PROCEDURE — 93010 ELECTROCARDIOGRAM REPORT: CPT | Performed by: INTERNAL MEDICINE

## 2020-05-29 PROCEDURE — 93005 ELECTROCARDIOGRAM TRACING: CPT

## 2020-05-29 RX ORDER — AMOXICILLIN 875 MG/1
TABLET, COATED ORAL
COMMUNITY
End: 2020-05-29

## 2020-05-29 RX ORDER — ZOLPIDEM TARTRATE 10 MG/1
2.5 TABLET ORAL NIGHTLY PRN
COMMUNITY
End: 2020-09-04 | Stop reason: SDUPTHER

## 2020-05-29 RX ORDER — OXYCODONE AND ACETAMINOPHEN 10; 325 MG/1; MG/1
TABLET ORAL
COMMUNITY
Start: 2020-05-27 | End: 2020-07-31

## 2020-05-29 RX ORDER — FLUOROMETHOLONE ACETATE 1 MG/ML
SUSPENSION/ DROPS OPHTHALMIC
COMMUNITY
End: 2020-07-31

## 2020-05-29 RX ORDER — CALCIUM CARBONATE 500 MG/1
500-1000 TABLET, CHEWABLE ORAL PRN
COMMUNITY

## 2020-05-29 RX ORDER — CYCLOBENZAPRINE HCL 5 MG
TABLET ORAL
COMMUNITY
End: 2020-07-21

## 2020-05-29 RX ORDER — ALPRAZOLAM 2 MG/1
TABLET ORAL
COMMUNITY
End: 2020-05-29

## 2020-05-29 RX ORDER — METHYLPREDNISOLONE 4 MG/1
TABLET ORAL
COMMUNITY
End: 2020-05-29

## 2020-05-29 RX ORDER — ALBUTEROL SULFATE 90 UG/1
AEROSOL, METERED RESPIRATORY (INHALATION)
COMMUNITY
Start: 2020-04-16 | End: 2020-05-29

## 2020-05-29 ASSESSMENT — FIBROSIS 4 INDEX: FIB4 SCORE: 2.8

## 2020-05-29 NOTE — OR NURSING
"Preadmit appointment: \" Preparing for your Procedure information\" sheet given to patient with verbal and written instructions. Patient instructed to continue prescribed medications through the day before surgery, instructed to take the following medications the day of surgery per anesthesia protocol:   Albuterol PRN, Bepreve eye drops, Cequa Eye drops, Flarex eye drops, Flonase, Synthroid, XIIdra eye drops, Cytomel, Prilosec, Tylenol PRN. Pt states, \"no issues with anesthesia\".  Anesthesia Fasting guidelines handout given to Pt, NPO 8 hours prior to surgery check-in and clear liquids up to 2 hours prior to surgery check-in, clear liquids defined for Pt    All Pt's questions and concerns answered or addressed.  COVID TEST 5/28.  Pt does have a history with his prostate, he is unable to void post surgery and would like to be catheterized in surgery if possible.      Pt Stopped the Aggrenox on 5/27.  "

## 2020-05-30 LAB
EKG IMPRESSION: NORMAL
SARS-COV-2 RNA RESP QL NAA+PROBE: NOT DETECTED
SPECIMEN SOURCE: NORMAL

## 2020-05-30 NOTE — OR NURSING
"1740: Pt called and asked COVID screening questions. Answered \"No\" to all questions. Pt reminded to bring a mask and that no visitors are allowed.  "

## 2020-05-31 ENCOUNTER — ANESTHESIA EVENT (OUTPATIENT)
Dept: SURGERY | Facility: MEDICAL CENTER | Age: 75
End: 2020-05-31
Payer: MEDICARE

## 2020-05-31 NOTE — ANESTHESIA PREPROCEDURE EVALUATION
76 yo M with Sjogren's syndrome, hypothyroidism, h/o TIASs, HTN, GERD, HLD, daily MJ use presenting for L shoulder arthroscopy for RCR    Relevant Problems   CARDIAC   (+) Essential hypertension      GI   (+) Gastroesophageal reflux disease without esophagitis      ENDO   (+) Hypothyroidism       Physical Exam    Airway   Mallampati: I  TM distance: >3 FB  Neck ROM: full       Cardiovascular - normal exam  Rhythm: regular  Rate: normal  (-) murmur     Dental - normal exam           Pulmonary - normal exam  Breath sounds clear to auscultation     Abdominal   (-) obese     Neurological - normal exam                 Anesthesia Plan    ASA 2       Plan - general and peripheral nerve block     Peripheral nerve block will be post-op pain control  Airway plan will be ETT        Induction: intravenous    Postoperative Plan: Postoperative administration of opioids is intended.    Pertinent diagnostic labs and testing reviewed    Informed Consent:    Anesthetic plan and risks discussed with patient.    Use of blood products discussed with: patient whom consented to blood products.

## 2020-06-01 ENCOUNTER — ANESTHESIA (OUTPATIENT)
Dept: SURGERY | Facility: MEDICAL CENTER | Age: 75
End: 2020-06-01
Payer: MEDICARE

## 2020-06-01 ENCOUNTER — HOSPITAL ENCOUNTER (OUTPATIENT)
Facility: MEDICAL CENTER | Age: 75
End: 2020-06-01
Attending: ORTHOPAEDIC SURGERY | Admitting: ORTHOPAEDIC SURGERY
Payer: MEDICARE

## 2020-06-01 VITALS
OXYGEN SATURATION: 95 % | SYSTOLIC BLOOD PRESSURE: 140 MMHG | RESPIRATION RATE: 18 BRPM | WEIGHT: 166.01 LBS | HEART RATE: 64 BPM | TEMPERATURE: 97.3 F | BODY MASS INDEX: 23.77 KG/M2 | HEIGHT: 70 IN | DIASTOLIC BLOOD PRESSURE: 88 MMHG

## 2020-06-01 PROCEDURE — 700111 HCHG RX REV CODE 636 W/ 250 OVERRIDE (IP): Performed by: ANESTHESIOLOGY

## 2020-06-01 PROCEDURE — 700101 HCHG RX REV CODE 250: Performed by: ANESTHESIOLOGY

## 2020-06-01 PROCEDURE — A9270 NON-COVERED ITEM OR SERVICE: HCPCS | Performed by: ANESTHESIOLOGY

## 2020-06-01 PROCEDURE — 160025 RECOVERY II MINUTES (STATS): Performed by: ORTHOPAEDIC SURGERY

## 2020-06-01 PROCEDURE — 502581 HCHG PACK, SHOULDER ARTHROSCOPY: Performed by: ORTHOPAEDIC SURGERY

## 2020-06-01 PROCEDURE — 160002 HCHG RECOVERY MINUTES (STAT): Performed by: ORTHOPAEDIC SURGERY

## 2020-06-01 PROCEDURE — 160009 HCHG ANES TIME/MIN: Performed by: ORTHOPAEDIC SURGERY

## 2020-06-01 PROCEDURE — 160029 HCHG SURGERY MINUTES - 1ST 30 MINS LEVEL 4: Performed by: ORTHOPAEDIC SURGERY

## 2020-06-01 PROCEDURE — 700102 HCHG RX REV CODE 250 W/ 637 OVERRIDE(OP): Performed by: ORTHOPAEDIC SURGERY

## 2020-06-01 PROCEDURE — 160041 HCHG SURGERY MINUTES - EA ADDL 1 MIN LEVEL 4: Performed by: ORTHOPAEDIC SURGERY

## 2020-06-01 PROCEDURE — 64415 NJX AA&/STRD BRCH PLXS IMG: CPT | Performed by: ORTHOPAEDIC SURGERY

## 2020-06-01 PROCEDURE — 501162 HCHG PROBE, VULCAN: Performed by: ORTHOPAEDIC SURGERY

## 2020-06-01 PROCEDURE — 700102 HCHG RX REV CODE 250 W/ 637 OVERRIDE(OP): Performed by: ANESTHESIOLOGY

## 2020-06-01 PROCEDURE — 160035 HCHG PACU - 1ST 60 MINS PHASE I: Performed by: ORTHOPAEDIC SURGERY

## 2020-06-01 PROCEDURE — 160046 HCHG PACU - 1ST 60 MINS PHASE II: Performed by: ORTHOPAEDIC SURGERY

## 2020-06-01 PROCEDURE — 700111 HCHG RX REV CODE 636 W/ 250 OVERRIDE (IP): Performed by: ORTHOPAEDIC SURGERY

## 2020-06-01 PROCEDURE — 160048 HCHG OR STATISTICAL LEVEL 1-5: Performed by: ORTHOPAEDIC SURGERY

## 2020-06-01 PROCEDURE — A9270 NON-COVERED ITEM OR SERVICE: HCPCS | Performed by: ORTHOPAEDIC SURGERY

## 2020-06-01 PROCEDURE — C1713 ANCHOR/SCREW BN/BN,TIS/BN: HCPCS | Performed by: ORTHOPAEDIC SURGERY

## 2020-06-01 PROCEDURE — 700105 HCHG RX REV CODE 258: Performed by: ORTHOPAEDIC SURGERY

## 2020-06-01 DEVICE — ANCHOR KNOTLESS REELX STT 4.5MM (5EA/BX): Type: IMPLANTABLE DEVICE | Site: SHOULDER | Status: FUNCTIONAL

## 2020-06-01 RX ORDER — MIDAZOLAM HYDROCHLORIDE 1 MG/ML
INJECTION INTRAMUSCULAR; INTRAVENOUS PRN
Status: DISCONTINUED | OUTPATIENT
Start: 2020-06-01 | End: 2020-06-01 | Stop reason: SURG

## 2020-06-01 RX ORDER — DEXAMETHASONE SODIUM PHOSPHATE 4 MG/ML
INJECTION, SOLUTION INTRA-ARTICULAR; INTRALESIONAL; INTRAMUSCULAR; INTRAVENOUS; SOFT TISSUE PRN
Status: DISCONTINUED | OUTPATIENT
Start: 2020-06-01 | End: 2020-06-01 | Stop reason: SURG

## 2020-06-01 RX ORDER — HYDROMORPHONE HYDROCHLORIDE 1 MG/ML
0.4 INJECTION, SOLUTION INTRAMUSCULAR; INTRAVENOUS; SUBCUTANEOUS
Status: DISCONTINUED | OUTPATIENT
Start: 2020-06-01 | End: 2020-06-01 | Stop reason: HOSPADM

## 2020-06-01 RX ORDER — LIDOCAINE HYDROCHLORIDE 40 MG/ML
SOLUTION TOPICAL PRN
Status: DISCONTINUED | OUTPATIENT
Start: 2020-06-01 | End: 2020-06-01 | Stop reason: SURG

## 2020-06-01 RX ORDER — MEPERIDINE HYDROCHLORIDE 25 MG/ML
6.25 INJECTION INTRAMUSCULAR; INTRAVENOUS; SUBCUTANEOUS
Status: DISCONTINUED | OUTPATIENT
Start: 2020-06-01 | End: 2020-06-01 | Stop reason: HOSPADM

## 2020-06-01 RX ORDER — HYDRALAZINE HYDROCHLORIDE 20 MG/ML
5 INJECTION INTRAMUSCULAR; INTRAVENOUS
Status: DISCONTINUED | OUTPATIENT
Start: 2020-06-01 | End: 2020-06-01 | Stop reason: HOSPADM

## 2020-06-01 RX ORDER — KETAMINE HYDROCHLORIDE 50 MG/ML
INJECTION, SOLUTION INTRAMUSCULAR; INTRAVENOUS PRN
Status: DISCONTINUED | OUTPATIENT
Start: 2020-06-01 | End: 2020-06-01 | Stop reason: SURG

## 2020-06-01 RX ORDER — BUPIVACAINE HYDROCHLORIDE AND EPINEPHRINE 5; 5 MG/ML; UG/ML
INJECTION, SOLUTION EPIDURAL; INTRACAUDAL; PERINEURAL
Status: COMPLETED | OUTPATIENT
Start: 2020-06-01 | End: 2020-06-01

## 2020-06-01 RX ORDER — SODIUM CHLORIDE, SODIUM LACTATE, POTASSIUM CHLORIDE, CALCIUM CHLORIDE 600; 310; 30; 20 MG/100ML; MG/100ML; MG/100ML; MG/100ML
INJECTION, SOLUTION INTRAVENOUS CONTINUOUS
Status: DISCONTINUED | OUTPATIENT
Start: 2020-06-01 | End: 2020-06-01 | Stop reason: HOSPADM

## 2020-06-01 RX ORDER — ONDANSETRON 2 MG/ML
INJECTION INTRAMUSCULAR; INTRAVENOUS PRN
Status: DISCONTINUED | OUTPATIENT
Start: 2020-06-01 | End: 2020-06-01 | Stop reason: SURG

## 2020-06-01 RX ORDER — HYDROMORPHONE HYDROCHLORIDE 1 MG/ML
0.2 INJECTION, SOLUTION INTRAMUSCULAR; INTRAVENOUS; SUBCUTANEOUS
Status: DISCONTINUED | OUTPATIENT
Start: 2020-06-01 | End: 2020-06-01 | Stop reason: HOSPADM

## 2020-06-01 RX ORDER — ROCURONIUM BROMIDE 10 MG/ML
INJECTION, SOLUTION INTRAVENOUS PRN
Status: DISCONTINUED | OUTPATIENT
Start: 2020-06-01 | End: 2020-06-01 | Stop reason: SURG

## 2020-06-01 RX ORDER — ONDANSETRON 2 MG/ML
4 INJECTION INTRAMUSCULAR; INTRAVENOUS
Status: DISCONTINUED | OUTPATIENT
Start: 2020-06-01 | End: 2020-06-01 | Stop reason: HOSPADM

## 2020-06-01 RX ORDER — LABETALOL HYDROCHLORIDE 5 MG/ML
5 INJECTION, SOLUTION INTRAVENOUS
Status: DISCONTINUED | OUTPATIENT
Start: 2020-06-01 | End: 2020-06-01 | Stop reason: HOSPADM

## 2020-06-01 RX ORDER — ACETAMINOPHEN 500 MG
1000 TABLET ORAL ONCE
Status: DISCONTINUED | OUTPATIENT
Start: 2020-06-01 | End: 2020-06-01 | Stop reason: HOSPADM

## 2020-06-01 RX ORDER — LORAZEPAM 2 MG/ML
0.5 INJECTION INTRAMUSCULAR
Status: DISCONTINUED | OUTPATIENT
Start: 2020-06-01 | End: 2020-06-01 | Stop reason: HOSPADM

## 2020-06-01 RX ORDER — HALOPERIDOL 5 MG/ML
1 INJECTION INTRAMUSCULAR
Status: DISCONTINUED | OUTPATIENT
Start: 2020-06-01 | End: 2020-06-01 | Stop reason: HOSPADM

## 2020-06-01 RX ORDER — EPINEPHRINE 1 MG/ML(1)
AMPUL (ML) INJECTION
Status: DISCONTINUED | OUTPATIENT
Start: 2020-06-01 | End: 2020-06-01 | Stop reason: HOSPADM

## 2020-06-01 RX ORDER — HYDROMORPHONE HYDROCHLORIDE 1 MG/ML
0.1 INJECTION, SOLUTION INTRAMUSCULAR; INTRAVENOUS; SUBCUTANEOUS
Status: DISCONTINUED | OUTPATIENT
Start: 2020-06-01 | End: 2020-06-01 | Stop reason: HOSPADM

## 2020-06-01 RX ORDER — SUCCINYLCHOLINE/SOD CL,ISO/PF 200MG/10ML
SYRINGE (ML) INTRAVENOUS PRN
Status: DISCONTINUED | OUTPATIENT
Start: 2020-06-01 | End: 2020-06-01 | Stop reason: SURG

## 2020-06-01 RX ORDER — DIPHENHYDRAMINE HYDROCHLORIDE 50 MG/ML
12.5 INJECTION INTRAMUSCULAR; INTRAVENOUS
Status: DISCONTINUED | OUTPATIENT
Start: 2020-06-01 | End: 2020-06-01 | Stop reason: HOSPADM

## 2020-06-01 RX ORDER — GABAPENTIN 300 MG/1
300 CAPSULE ORAL ONCE
Status: COMPLETED | OUTPATIENT
Start: 2020-06-01 | End: 2020-06-01

## 2020-06-01 RX ORDER — LIDOCAINE HYDROCHLORIDE 20 MG/ML
INJECTION, SOLUTION EPIDURAL; INFILTRATION; INTRACAUDAL; PERINEURAL PRN
Status: DISCONTINUED | OUTPATIENT
Start: 2020-06-01 | End: 2020-06-01 | Stop reason: HOSPADM

## 2020-06-01 RX ORDER — OXYCODONE HCL 5 MG/5 ML
5 SOLUTION, ORAL ORAL
Status: DISCONTINUED | OUTPATIENT
Start: 2020-06-01 | End: 2020-06-01 | Stop reason: HOSPADM

## 2020-06-01 RX ORDER — OXYCODONE HCL 5 MG/5 ML
10 SOLUTION, ORAL ORAL
Status: DISCONTINUED | OUTPATIENT
Start: 2020-06-01 | End: 2020-06-01 | Stop reason: HOSPADM

## 2020-06-01 RX ORDER — CEFAZOLIN SODIUM 1 G/3ML
INJECTION, POWDER, FOR SOLUTION INTRAMUSCULAR; INTRAVENOUS PRN
Status: DISCONTINUED | OUTPATIENT
Start: 2020-06-01 | End: 2020-06-01 | Stop reason: SURG

## 2020-06-01 RX ADMIN — FENTANYL CITRATE 25 MCG: 50 INJECTION, SOLUTION INTRAMUSCULAR; INTRAVENOUS at 11:20

## 2020-06-01 RX ADMIN — KETAMINE HYDROCHLORIDE 25 MG: 50 INJECTION INTRAMUSCULAR; INTRAVENOUS at 10:24

## 2020-06-01 RX ADMIN — PROPOFOL 80 MG: 10 INJECTION, EMULSION INTRAVENOUS at 10:25

## 2020-06-01 RX ADMIN — ONDANSETRON 4 MG: 2 INJECTION INTRAMUSCULAR; INTRAVENOUS at 10:30

## 2020-06-01 RX ADMIN — DEXAMETHASONE SODIUM PHOSPHATE 4 MG: 4 INJECTION, SOLUTION INTRAMUSCULAR; INTRAVENOUS at 10:30

## 2020-06-01 RX ADMIN — MIDAZOLAM HYDROCHLORIDE 1 MG: 1 INJECTION, SOLUTION INTRAMUSCULAR; INTRAVENOUS at 10:05

## 2020-06-01 RX ADMIN — POVIDONE-IODINE 15 ML: 10 SOLUTION TOPICAL at 08:25

## 2020-06-01 RX ADMIN — ROCURONIUM BROMIDE 30 MG: 10 INJECTION, SOLUTION INTRAVENOUS at 10:30

## 2020-06-01 RX ADMIN — CEFAZOLIN 2 G: 1 INJECTION, POWDER, FOR SOLUTION INTRAVENOUS at 10:30

## 2020-06-01 RX ADMIN — MIDAZOLAM HYDROCHLORIDE 1 MG: 1 INJECTION, SOLUTION INTRAMUSCULAR; INTRAVENOUS at 10:22

## 2020-06-01 RX ADMIN — SODIUM CHLORIDE, POTASSIUM CHLORIDE, SODIUM LACTATE AND CALCIUM CHLORIDE: 600; 310; 30; 20 INJECTION, SOLUTION INTRAVENOUS at 08:25

## 2020-06-01 RX ADMIN — FENTANYL CITRATE 25 MCG: 50 INJECTION, SOLUTION INTRAMUSCULAR; INTRAVENOUS at 10:05

## 2020-06-01 RX ADMIN — LIDOCAINE HYDROCHLORIDE 120 MG: 40 SOLUTION TOPICAL at 10:26

## 2020-06-01 RX ADMIN — Medication 100 MG: at 10:25

## 2020-06-01 RX ADMIN — LIDOCAINE HYDROCHLORIDE 60 MG: 20 INJECTION, SOLUTION EPIDURAL; INFILTRATION; INTRACAUDAL; PERINEURAL at 10:25

## 2020-06-01 RX ADMIN — GABAPENTIN 300 MG: 300 CAPSULE ORAL at 08:25

## 2020-06-01 RX ADMIN — SUGAMMADEX 200 MG: 100 INJECTION, SOLUTION INTRAVENOUS at 11:20

## 2020-06-01 RX ADMIN — MEPERIDINE HYDROCHLORIDE 6.25 MG: 25 INJECTION INTRAMUSCULAR; INTRAVENOUS; SUBCUTANEOUS at 12:52

## 2020-06-01 RX ADMIN — BUPIVACAINE HYDROCHLORIDE AND EPINEPHRINE BITARTRATE 15 ML: 5; .0091 INJECTION, SOLUTION EPIDURAL; INTRACAUDAL; PERINEURAL at 10:05

## 2020-06-01 ASSESSMENT — PAIN SCALES - GENERAL: PAIN_LEVEL: 5

## 2020-06-01 ASSESSMENT — FIBROSIS 4 INDEX: FIB4 SCORE: 2.8

## 2020-06-01 NOTE — OR NURSING
0836: Patient allergies and NPO status verified, home medication reconciliation completed and belongings secured. Patient verbalizes understanding of pain scale, expected course of stay and plan of care. Surgical site verified with patient. IV access established. Sequentials placed on legs. Triple aim completed by CNA.

## 2020-06-01 NOTE — OR NURSING
"1133: To PACU post left shoulder arthroscopy w/ block. Pt is extubated, breathing is spontaneous and unlabored. C/o pain in lower arm.  1159: Pain in lower arm is \"dull ache\", tolerable.  1224: No change. Meets criteria for stage ll.  "

## 2020-06-01 NOTE — OR SURGEON
Immediate Post OP Note    PreOp Diagnosis: L partial RCT, SLAP    PostOp Diagnosis: same    Procedure(s):  ARTHROSCOPY, SHOULDER, WITH ROTATOR CUFF REPAIR -  DEBRIDEMENT  DECOMPRESSION, SHOULDER, ARTHROSCOPIC - SUBACROMIAL  ARTHROSCOPY, SHOULDER, WITH BICEPS TENOTOMY TENOTOMY - Wound Class: Clean    Surgeon(s):  Newton Perea M.D.    Anesthesiologist/Type of Anesthesia:  Anesthesiologist: Shalini Justin M.D./General    Surgical Staff:  Circulator: Vielka Betancur R.N.  Relief Circulator: Derek Trimble R.N.  Scrub Person: Vasquez Jane Assist: Freedom Wu    Specimens removed if any:  * No specimens in log *    Estimated Blood Loss: MIN    Findings: ABOVE    Complications: NONE        6/1/2020 11:31 AM Newton Perea M.D.

## 2020-06-01 NOTE — ANESTHESIA PROCEDURE NOTES
Peripheral Block    Date/Time: 6/1/2020 10:05 AM  Performed by: Shalini Justin M.D.  Authorized by: Shalini Justin M.D.     Patient Location:  OR  Start Time:  6/1/2020 10:05 AM  End Time:  6/1/2020 10:10 AM  Reason for Block: at surgeon's request and post-op pain management    patient identified, IV checked, site marked, risks and benefits discussed, surgical consent, monitors and equipment checked, pre-op evaluation and timeout performed    Patient Position:  Supine  Prep: ChloraPrep    Monitoring:  Heart rate, continuous pulse ox and cardiac monitor  Block Region:  Upper Extremity  Upper Extremity - Block Type:  BRACHIAL PLEXUS block, Interscalene approach      Laterality:  Left  Procedures: ultrasound guided  Image captured, interpreted and electronically stored.  Strength:  1 %  Dose:  3 ml  Block Type:  Single-shot  Needle Length:  50mm  Needle Gauge:  22 G  Needle Localization:  Ultrasound guidance  Catheter at Skin Depth:  3  Injection Assessment:  Negative aspiration for heme, no paresthesia on injection, incremental injection and local visualized surrounding nerve on ultrasound  Evidence of intravascular injection: No     H&P reviewed. Consent signed. R/B/A discussed with patient including incomplete block, nerve damage, pain on injection, paresthesias, temporary hemidiaphragmatic paralysis, Amrit's syndrome, intravascular injection. All questions answered. Site marked and T.O. performed. Pt given sedation. Using sterile technique and ultrasound, identified brachial plexus at the level of the anterior and middle scalene muscles. Incremental injection (3-5 mL) with negative aspiration of heme. Pt tolerated procedure well.

## 2020-06-01 NOTE — ANESTHESIA PROCEDURE NOTES
Airway    Date/Time: 6/1/2020 10:27 AM  Performed by: Shalini Justin M.D.  Authorized by: Shalini Justin M.D.     Location:  OR  Urgency:  Elective  Indications for Airway Management:  Anesthesia      Spontaneous Ventilation: absent    Sedation Level:  Deep  Preoxygenated: Yes    Patient Position:  Sniffing  MILS Maintained Throughout: Yes    Mask Difficulty Assessment:  0 - not attempted  Final Airway Type:  Endotracheal airway  Final Endotracheal Airway:  ETT  Cuffed: Yes    Technique Used for Successful ETT Placement:  Direct laryngoscopy    Insertion Site:  Oral  Blade Type:  Sandra  Laryngoscope Blade/Videolaryngoscope Blade Size:  3  ETT Size (mm):  7.5  Measured from:  Teeth  ETT to Teeth (cm):  23  Placement Verified by: auscultation and capnometry    Cormack-Lehane Classification:  Grade I - full view of glottis  Number of Attempts at Approach:  1  Ventilation Between Attempts:  None  Number of Other Approaches Attempted:  0

## 2020-06-01 NOTE — DISCHARGE INSTRUCTIONS
ACTIVITY: Rest and take it easy for the first 24 hours.  A responsible adult is recommended to remain with you during that time.  It is normal to feel sleepy.  We encourage you to not do anything that requires balance, judgment or coordination.    MILD FLU-LIKE SYMPTOMS ARE NORMAL. YOU MAY EXPERIENCE GENERALIZED MUSCLE ACHES, THROAT IRRITATION, HEADACHE AND/OR SOME NAUSEA.    FOR 24 HOURS DO NOT:  Drive, operate machinery or run household appliances.  Drink beer or alcoholic beverages.   Make important decisions or sign legal documents.    SPECIAL INSTRUCTIONS: Activity is to be non weight bearing to operative extremity. Ice and elevate.    DIET: To avoid nausea, slowly advance diet as tolerated, avoiding spicy or greasy foods for the first day.  Add more substantial food to your diet according to your physician's instructions.  INCREASE FLUIDS AND FIBER TO AVOID CONSTIPATION.    SURGICAL DRESSING/BATHING: Keep dressings clean and dry. Do not remove dressings or shower until after your physical therapy appointment.    FOLLOW-UP APPOINTMENT:  A follow-up appointment should be arranged with your doctor; call to schedule.    You should CALL YOUR PHYSICIAN if you develop:  Fever greater than 101 degrees F.  Pain not relieved by medication, or persistent nausea or vomiting.  Excessive bleeding (blood soaking through dressing) or unexpected drainage from the wound.  Extreme redness or swelling around the incision site, drainage of pus or foul smelling drainage.  Inability to urinate or empty your bladder within 8 hours.    You should call 911 if you develop problems with breathing or chest pain.    If you are unable to contact your doctor or surgical center, you should go to the nearest emergency room or urgent care center.      Physician's telephone #: Dr. Perea [phone]    If any questions arise, call your doctor.  If your doctor is not available, please feel free to call the Surgical Center at [phone].   The Center is open Monday through Friday from 7AM to 7PM.      You can also call the HEALTH HOTLINE open 24 hours/day, 7 days/week and speak to a nurse at [phone], or toll free at [phone].    A registered nurse may call you a few days after your surgery to see how you are doing after your procedure.    MEDICATIONS: Resume taking daily medication.  Take prescribed pain medication with food.  If no medication is prescribed, you may take non-aspirin pain medication if needed.  PAIN MEDICATION CAN BE VERY CONSTIPATING.  Take a stool softener or laxative such as senokot, pericolace, or milk of magnesia if needed.    Prescription given pre-operatively.      Last pain medication given: None.    If your physician has prescribed pain medication that includes Acetaminophen (Tylenol), do not take additional Acetaminophen (Tylenol) while taking the prescribed medication.      Peripheral Nerve Block Discharge Instructions from Same Day Surgery and Inpatient :    Precautions  · The numbness may affect your balance  · Be careful when walking or moving around  · Your leg may be weak: be very careful putting weight on it  · If your surgeon did not specify a time, you should not bear weight for 24 hours  · Be sure to ask for help when you need it  · It is better to have help than to fall and hurt yourself  What to Expect - Upper Extremity  · You may experience numbness and weakness in shoulder, arm and hand  on the same side as your surgery  · This is normal. For some people, this may be an unpleasant sensation. Be very careful with your numb limb  · Ask for help when you need it  Shoulder Surgery Side Effects  · In addition to numbness and weakness you may experience other symptoms  · Other nerves that are close to those nerves injected can also be affected by local anesthesia  · You may experience a hoarseness in your voice  · Your breathing may feel different  · You may also notice drooping of your eyelid, pupil  "constriction, and decreased sweating, on the side of your surgery  · All of these side effects are normal and will resolve when the local anesthetic wears off   Prevent Injury  · Protect the limb like a baby  · Beware of exposing your limb to extreme heat or cold or trauma  · The limb may be injured without you noticing because it is numb  · Keep the limb elevated whenever possible  · Do not sleep on the limb  · Change the position of the limb regularly  · Avoid putting pressure on your surgical limb  Pain Control  · The initial block on the day of surgery will make your extremity feel \"numb\"  · Any consecutive injection including prior to discharge from the hospital will make your extremity feel \"numb\"  · You may feel an aching or burning when the local anesthesia starts to wear off  · Take pain pills as prescribed by your surgeon  · Call your surgeon or anesthesiologist if you do not have adequate pain control       Depression / Suicide Risk      As you are discharged from this [facility] facility, it is important to learn how to keep safe from harming yourself.    Recognize the warning signs:  · Abrupt changes in personality, positive or negative- including increase in energy   · Giving away possessions  · Change in eating patterns- significant weight changes-  positive or negative  · Change in sleeping patterns- unable to sleep or sleeping all the time   · Unwillingness or inability to communicate  · Depression  · Unusual sadness, discouragement and loneliness  · Talk of wanting to die  · Neglect of personal appearance   · Rebelliousness- reckless behavior  · Withdrawal from people/activities they love  · Confusion- inability to concentrate     If you or a loved one observes any of these behaviors or has concerns about self-harm, here's what you can do:  · Talk about it- your feelings and reasons for harming yourself  · Remove any means that you might use to hurt yourself (examples: pills, rope, extension " cords, firearm)  · Get professional help from the community (Mental Health, Substance Abuse, psychological counseling)  · Do not be alone:Call your Safe Contact- someone whom you trust who will be there for you.  · Call your local CRISIS HOTLINE 274-6163 or 230-844-2481  · Call your local Children's Mobile Crisis Response Team [place] (583) 383-5488 or www.[employer]  · Call the toll free National Suicide Prevention Hotlines   · National Suicide Prevention Lifeline 287-795-MPOT (0906)  National Hope Line Network 800-SUICIDE (523-0522)

## 2020-06-01 NOTE — ANESTHESIA POSTPROCEDURE EVALUATION
Patient: Martin Briceño    Procedure Summary     Date:  06/01/20 Room / Location:   OR 04 / SURGERY [facility]    Anesthesia Start:  1021 Anesthesia Stop:  1136    Procedures:       ARTHROSCOPY, SHOULDER, WITH ROTATOR CUFF REPAIR; EXTENSIVE DEBRIDEMENT; SUBSCAPULARIS REPAIR (Left Shoulder)      DECOMPRESSION, SHOULDER, ARTHROSCOPIC - SUBACROMIAL (Left Shoulder) Diagnosis:  (SUPRASPINATUS SYNDROME LEFT)    Surgeon:  Newton Perea M.D. Responsible Provider:  Shalini Justin M.D.    Anesthesia Type:  general, peripheral nerve block ASA Status:  2          Final Anesthesia Type: general, peripheral nerve block  Last vitals  BP   Blood Pressure : 146/87    Temp   36.3 °C (97.3 °F)    Pulse   Pulse: (!) 57   Resp   12    SpO2   95 %      Anesthesia Post Evaluation    Patient location during evaluation: PACU  Patient participation: complete - patient participated  Level of consciousness: awake and alert  Pain score: 5    Airway patency: patent  Anesthetic complications: no  Cardiovascular status: hemodynamically stable  Respiratory status: acceptable  Hydration status: euvolemic    PONV: none    patient able to participate, but full recovery from regional anesthesia has not occurred and is not expected within the stipulated timeframe for the completion of the evaluation       Nurse Pain Score: 5 (NPRS)

## 2020-06-01 NOTE — OP REPORT
DATE OF SERVICE:  06/01/2020    PREOPERATIVE DIAGNOSES:  Left shoulder partial-thickness rotator cuff tear and   superior labrum anterior and posterior lesion.      POSTOPERATIVE DIAGNOSES:    1.  Left shoulder partial-thickness subscapularis and supraspinatus tears.    2.  Right shoulder partial biceps tear and circumferential degenerative labral   tear.      PROCEDURES:     1.  Left shoulder arthroscopy with extensive debridement of anterior, superior   and posterior labrum, supraspinatus, and bursa.    2.  Left shoulder arthroscopic subscapularis repair.    3.  Left shoulder arthroscopic subacromial decompression.    4.  Left shoulder arthroscopic biceps tenotomy.      SURGEON:  Newton Perea MD    ASSISTANT:  Freedom Wu.    ANESTHESIA:  General with regional block.    BLOOD LOSS:  Minimal.    INDICATION:  The patient is a 75-year-old gentleman with a greater than 1-year   history of left shoulder pain refractory to conservative management.  MRI   shows significant partial-thickness rotator cuff tears and superior labral   tear.  He is indicated for arthroscopic management.      FINDINGS:  Exam under anesthesia revealed a full range of motion, no evidence   of instability.  Arthroscopic examination of the glenohumeral joint revealed   the articular surfaces to be intact.  There was degenerative fraying of the   labrum circumferentially and instability of the biceps anchor.  There was   partial-thickness tearing of the biceps extending into the groove.  There was   an articular side partial-thickness subscapularis tear with some peeling of   the subscapularis off the lesser tuberosity.  There was a small   partial-thickness anterior supraspinatus tear with minimal involvement of the   footprint.  Examination of the subacromial space revealed bursal side fraying   of the supraspinatus with no discrete flaps or deep tears.  There is a   moderate anterior acromial hook.  No calcific deposit was found.       PROCEDURE:  Following induction of general anesthesia, timeout was performed   confirming patient, site, and procedure.  Two grams of Ancef IV were ordered   and administered.  Patient was positioned in the lateral decubitus position   with the left shoulder up.  The left shoulder and upper extremity were prepped   and draped in the usual fashion and suspended from 8 pounds longitudinal   traction.  Standard posterior, anterior, and lateral portals were established   and diagnostic arthroscopy was performed with the findings as above.  The   shaver was used to debride the anterior, superior and posterior labrum.  The   partial biceps tearing was debrided.  The partial-thickness supraspinatus tear   was debrided.  The superior subscapularis was debrided and the medial   footprint on the lesser tuberosity was debrided down to bleeding bone.  A   working cannula was placed anteriorly and a slingshot was used to place a tape   through the superior half of the distal subscapularis adjacent to the   footprint.  This was then approximated to the superior footprint with a ReelX   anchor completing the repair of the subscapularis.      Next, the subacromial space was entered, bursa was debrided with the shaver.    The frayed bursal surface of the rotator cuff was debrided with the shaver.    The coracoacromial ligament was released from the anterior acromion.  The   anterior acromial hook was resected with the shaver leaving a smooth flat   undersurface of the acromion.  Remaining debris and bursa was debrided with   the shaver.  The arthroscope was withdrawn.  The portals were closed with   nylon sutures.  Sterile dressing was applied followed by an UltraSling.  The   patient was awakened and extubated in the operating room and transferred to   recovery room in stable condition.       ____________________________________     MD ROSALBA Leonard / LINDA    DD:  06/01/2020 11:28:18  DT:  06/01/2020 12:49:53    D#:   [id number]  Job#:  [id number]

## 2020-06-01 NOTE — OR NURSING
1233: Patient in Phase II of PACU.    1240: Patient is experiencing post op tremors, Patient denies pain and or nausea. Plan to medicate per MAR.    1300: Tremors has stopped, patient denies pain or nausea.    1325: D/Ralph to care of family post uneventful stay in PACU 2.

## 2020-06-01 NOTE — ANESTHESIA TIME REPORT
Anesthesia Start and Stop Event Times     Date Time Event    6/1/2020 1000 Ready for Procedure     1021 Anesthesia Start     1136 Anesthesia Stop        Responsible Staff  06/01/20    Name Role Begin End    Shalini Justin M.D. Anesth 1021 1136        Preop Diagnosis (Free Text):  Pre-op Diagnosis     SUPRASPINATUS SYNDROME LEFT        Preop Diagnosis (Codes):    Post op Diagnosis  Supraspinatus syndrome of left shoulder      Premium Reason  Non-Premium    Comments:

## 2020-06-02 NOTE — ANESTHESIA POSTPROCEDURE EVALUATION
Pt is doing well today. The block wore off within 14 hours. Pt reports that pain is well controlled with oral medications. All questions were answered.     Patient: Martin Briceño    Procedure Summary     Date:  06/01/20 Room / Location:   OR 04 / SURGERY [facility]    Anesthesia Start:  1021 Anesthesia Stop:  1136    Procedures:       ARTHROSCOPY, SHOULDER, WITH ROTATOR CUFF REPAIR; EXTENSIVE DEBRIDEMENT; SUBSCAPULARIS REPAIR (Left Shoulder)      DECOMPRESSION, SHOULDER, ARTHROSCOPIC - SUBACROMIAL (Left Shoulder) Diagnosis:  (SUPRASPINATUS SYNDROME LEFT)    Surgeon:  Newton Perea M.D. Responsible Provider:  Shalini Justin M.D.    Anesthesia Type:  general, peripheral nerve block ASA Status:  2          Final Anesthesia Type: general, peripheral nerve block  Last vitals  BP   Blood Pressure : 140/88    Temp   36.3 °C (97.3 °F)    Pulse   Pulse: 64   Resp   18    SpO2   95 %      Anesthesia Post Evaluation     Nurse Pain Score: 0 (NPRS)

## 2020-06-04 ENCOUNTER — PATIENT MESSAGE (OUTPATIENT)
Dept: MEDICAL GROUP | Facility: MEDICAL CENTER | Age: 75
End: 2020-06-04

## 2020-06-04 DIAGNOSIS — S22.070S COMPRESSION FRACTURE OF T10 VERTEBRA, SEQUELA: ICD-10-CM

## 2020-06-22 ENCOUNTER — OFFICE VISIT (OUTPATIENT)
Dept: MEDICAL GROUP | Facility: MEDICAL CENTER | Age: 75
End: 2020-06-22
Payer: MEDICARE

## 2020-06-22 VITALS
TEMPERATURE: 98.3 F | HEART RATE: 59 BPM | SYSTOLIC BLOOD PRESSURE: 124 MMHG | WEIGHT: 163.6 LBS | OXYGEN SATURATION: 97 % | HEIGHT: 70 IN | BODY MASS INDEX: 23.42 KG/M2 | DIASTOLIC BLOOD PRESSURE: 72 MMHG

## 2020-06-22 DIAGNOSIS — R79.89 ELEVATED TESTOSTERONE LEVEL IN MALE: ICD-10-CM

## 2020-06-22 DIAGNOSIS — K64.9 HEMORRHOIDS, UNSPECIFIED HEMORRHOID TYPE: ICD-10-CM

## 2020-06-22 DIAGNOSIS — R15.9 INCONTINENCE OF FECES, UNSPECIFIED FECAL INCONTINENCE TYPE: ICD-10-CM

## 2020-06-22 DIAGNOSIS — F40.240 CLAUSTROPHOBIA: ICD-10-CM

## 2020-06-22 DIAGNOSIS — H61.21 IMPACTED CERUMEN OF RIGHT EAR: ICD-10-CM

## 2020-06-22 DIAGNOSIS — M35.00 SJOGREN'S SYNDROME, WITH UNSPECIFIED ORGAN INVOLVEMENT (HCC): ICD-10-CM

## 2020-06-22 DIAGNOSIS — K86.9 PANCREATIC LESION: ICD-10-CM

## 2020-06-22 DIAGNOSIS — N28.9 RENAL LESION: ICD-10-CM

## 2020-06-22 PROCEDURE — 99214 OFFICE O/P EST MOD 30 MIN: CPT | Performed by: FAMILY MEDICINE

## 2020-06-22 RX ORDER — DIAZEPAM 10 MG/1
10 TABLET ORAL
Qty: 1 TAB | Refills: 0 | Status: SHIPPED | OUTPATIENT
Start: 2020-06-22 | End: 2020-07-21 | Stop reason: SDUPTHER

## 2020-06-22 ASSESSMENT — FIBROSIS 4 INDEX: FIB4 SCORE: 2.8

## 2020-06-22 NOTE — PROGRESS NOTES
Subjective:   Martin Briceño is a 75 y.o. male here today for pancreatic lesion, renal lesion, hemorrhoids, incontinence, impacted cerumen    Elevated testosterone level in male  Not on testosterone supplementation, but is taking various herbal supplements.       Patient had pancreatic and renal lesion on CT scan in February.  MRI was recommended, but he has not had an MRI ordered.  Patient states he is claustrophobic when going into the MRI machine so has required diazepam in the past.    Patient is complaining of occasional incontinence of feces with fecal leakage.  He does have a hemorrhoid that has not been treated.    Patient has fullness in his right ear.    Patient is complaining of ongoing dizziness possibly related to Sjogren's.    Current medicines (including changes today)  Current Outpatient Medications   Medication Sig Dispense Refill   • diazePAM (VALIUM) 10 MG tablet Take 1 Tab by mouth Pre-Op Once PRN (MRI) for up to 1 day. 1 Tab 0   • hydroxychloroquine (PLAQUENIL) 200 MG Tab TAKE 1 TABLET BY MOUTH TWICE A  Tab 1   • zolpidem (AMBIEN) 10 MG Tab 5 mg.     • oxyCODONE-acetaminophen (PERCOCET-10)  MG Tab      • fluorometholone (FLAREX) 0.1 % ophthalmic suspension Flarex 0.1 % eye drops,suspension     • cyclobenzaprine (FLEXERIL) 5 MG tablet cyclobenzaprine 5 mg tablet     • calcium carbonate (TUMS) 500 MG Chew Tab Take 500 mg by mouth every day.     • Non Formulary Request HYDOEYE FOR DRY EYES     • azelastine (ASTELIN) 137 MCG/SPRAY nasal spray [place] 1 Spray in nose 2 times a day. (Patient taking differently: Spray 1 Spray in nose every evening.) 30 mL 2   • albuterol 108 (90 Base) MCG/ACT Aero Soln inhalation aerosol Inhale 2 Puffs by mouth every four hours as needed for Shortness of Breath. 1 Inhaler 2   • atorvastatin (LIPITOR) 20 MG Tab Take 1 Tab by mouth every day. 90 Tab 3   • levothyroxine (SYNTHROID) 125 MCG Tab Take 1 Tab by mouth Every morning on an empty stomach. 90 Tab  "3   • liothyronine (CYTOMEL) 5 MCG Tab Take 1 Tab by mouth every day. 90 Tab 3   • aspirin-dipyridamole (AGGRENOX)  MG CAPSULE SR 12 HR Take 1 Cap by mouth 2 times a day. 180 Cap 3   • losartan (COZAAR) 50 MG Tab Take 1 Tab by mouth every day. (Patient taking differently: Take 50 mg by mouth every evening.) 90 Tab 3   • omeprazole (PRILOSEC) 40 MG delayed-release capsule Take 1 Cap by mouth every day. (Patient taking differently: Take 40 mg by mouth 2 times a day.) 90 Cap 3   • cycloSPORINE, PF, (CEQUA) 0.09 % Solution by Ophthalmic route.     • Ascorbic Acid (VITAMIN C) 1000 MG Tab Take 1,000 mg by mouth.     • Lifitegrast (XIIDRA) 5 % Solution      • fluticasone (FLONASE) 50 MCG/ACT nasal spray Spray 1 Spray in nose 2 times a day.     • tadalafil (CIALIS) 10 MG tablet Take 10 mg by mouth as needed for Erectile Dysfunction.     • Bepotastine Besilate (BEPREVE) 1.5 % Solution by Ophthalmic route. Seasonal eye drops for dry eyes     • Cyanocobalamin (B-12) 1000 MCG Cap Take  by mouth.     • cholecalciferol (D-3-5) 5000 UNIT Cap Take 6,000 Units by mouth every day.     • Coenzyme Q10 (COQ10) 400 MG Cap Take  by mouth.       No current facility-administered medications for this visit.      He  has a past medical history of Chronic pain, Heart burn, High cholesterol, Hypertension, Indigestion, Post-nasal drip, Sjogren's syndrome (HCC), Stroke ([facility]) (2007), and Unspecified disorder of thyroid.    ROS   No chest pain, no shortness of breath       Objective:     /72 (BP Location: Right arm, Patient Position: Sitting, BP Cuff Size: Adult)   Pulse (!) 59   Temp 36.8 °C (98.3 °F) (Temporal)   Ht 1.778 m (5' 10\")   Wt 74.2 kg (163 lb 9.6 oz)   SpO2 97%  Body mass index is 23.47 kg/m².   Physical Exam:  Constitutional: Alert, no distress.  Skin: Warm, dry, good turgor, no rashes in visible areas.  Eye: Equal, round and reactive, conjunctiva clear, lids normal.  ENMT:  Right cerumen impaction.  Rectal: Large " hemorrhoid that involves sphincter.  Psych: Alert and oriented x3, normal affect and mood.        Assessment and Plan:   The following treatment plan was discussed    1. Pancreatic lesion  MRI ordered, message with results.  - MR-ABDOMEN-WITH & W/O; Future    2. Renal lesion  MRI ordered, message with results.  - MR-ABDOMEN-WITH & W/O; Future    3. Claustrophobia  - diazePAM (VALIUM) 10 MG tablet; Take 1 Tab by mouth Pre-Op Once PRN (MRI) for up to 1 day.  Dispense: 1 Tab; Refill: 0    4. Elevated testosterone level in male  Stable.  Continue to monitor on labs.  Advised patient discontinue certain supplementation.    5. Hemorrhoids, unspecified hemorrhoid type  Referral to colorectal surgeon for evaluation.  - REFERRAL TO COLORECTAL SURGERY    6. Incontinence of feces, unspecified fecal incontinence type  Possibly related to hemorrhoids.  Referral to colorectal surgeon for evaluation.  - REFERRAL TO COLORECTAL SURGERY    7. Impacted cerumen of right ear  Attempted lavage in clinic but it was unsuccessful.  Advised Debrox.  We will call patient and have him follow-up in 1 week    8. Sjogren's syndrome, with unspecified organ involvement (HCC)  Advised patient to follow-up with rheumatology for ongoing treatment.      Followup: Return if symptoms worsen or fail to improve.

## 2020-06-30 ENCOUNTER — PATIENT MESSAGE (OUTPATIENT)
Dept: MEDICAL GROUP | Facility: MEDICAL CENTER | Age: 75
End: 2020-06-30

## 2020-06-30 DIAGNOSIS — N28.9 RENAL LESION: ICD-10-CM

## 2020-07-13 ENCOUNTER — HOSPITAL ENCOUNTER (OUTPATIENT)
Dept: LAB | Facility: MEDICAL CENTER | Age: 75
End: 2020-07-13
Attending: FAMILY MEDICINE
Payer: MEDICARE

## 2020-07-13 DIAGNOSIS — N28.9 RENAL LESION: ICD-10-CM

## 2020-07-13 LAB
ANION GAP SERPL CALC-SCNC: 11 MMOL/L (ref 7–16)
BUN SERPL-MCNC: 14 MG/DL (ref 8–22)
CALCIUM SERPL-MCNC: 8.9 MG/DL (ref 8.4–10.2)
CHLORIDE SERPL-SCNC: 98 MMOL/L (ref 96–112)
CO2 SERPL-SCNC: 27 MMOL/L (ref 20–33)
CREAT SERPL-MCNC: 0.88 MG/DL (ref 0.5–1.4)
FASTING STATUS PATIENT QL REPORTED: NORMAL
GLUCOSE SERPL-MCNC: 92 MG/DL (ref 65–99)
POTASSIUM SERPL-SCNC: 4.1 MMOL/L (ref 3.6–5.5)
SODIUM SERPL-SCNC: 136 MMOL/L (ref 135–145)

## 2020-07-13 PROCEDURE — 36415 COLL VENOUS BLD VENIPUNCTURE: CPT

## 2020-07-13 PROCEDURE — 80048 BASIC METABOLIC PNL TOTAL CA: CPT

## 2020-07-14 ENCOUNTER — HOSPITAL ENCOUNTER (OUTPATIENT)
Dept: LAB | Facility: MEDICAL CENTER | Age: 75
End: 2020-07-14
Attending: INTERNAL MEDICINE
Payer: MEDICARE

## 2020-07-14 ENCOUNTER — OFFICE VISIT (OUTPATIENT)
Dept: RHEUMATOLOGY | Facility: MEDICAL CENTER | Age: 75
End: 2020-07-14
Payer: MEDICARE

## 2020-07-14 VITALS
OXYGEN SATURATION: 97 % | DIASTOLIC BLOOD PRESSURE: 60 MMHG | WEIGHT: 161 LBS | HEART RATE: 62 BPM | TEMPERATURE: 97.4 F | BODY MASS INDEX: 23.1 KG/M2 | SYSTOLIC BLOOD PRESSURE: 110 MMHG | RESPIRATION RATE: 14 BRPM

## 2020-07-14 DIAGNOSIS — K90.41 GLUTEN INTOLERANCE: ICD-10-CM

## 2020-07-14 DIAGNOSIS — G44.009 CLUSTER HEADACHE, NOT INTRACTABLE, UNSPECIFIED CHRONICITY PATTERN: ICD-10-CM

## 2020-07-14 DIAGNOSIS — Z86.73 HISTORY OF TIA (TRANSIENT ISCHEMIC ATTACK): ICD-10-CM

## 2020-07-14 DIAGNOSIS — K57.92 DIVERTICULITIS: ICD-10-CM

## 2020-07-14 DIAGNOSIS — Z79.899 LONG-TERM USE OF PLAQUENIL: ICD-10-CM

## 2020-07-14 DIAGNOSIS — R71.8 ELEVATED MCV: ICD-10-CM

## 2020-07-14 DIAGNOSIS — E06.9 THYROIDITIS: ICD-10-CM

## 2020-07-14 DIAGNOSIS — M35.00 SJOGREN'S SYNDROME, WITH UNSPECIFIED ORGAN INVOLVEMENT (HCC): ICD-10-CM

## 2020-07-14 DIAGNOSIS — E21.3 HYPERPARATHYROIDISM (HCC): ICD-10-CM

## 2020-07-14 LAB
BASOPHILS # BLD AUTO: 0.6 % (ref 0–1.8)
BASOPHILS # BLD: 0.03 K/UL (ref 0–0.12)
EOSINOPHIL # BLD AUTO: 0.07 K/UL (ref 0–0.51)
EOSINOPHIL NFR BLD: 1.3 % (ref 0–6.9)
ERYTHROCYTE [DISTWIDTH] IN BLOOD BY AUTOMATED COUNT: 42.5 FL (ref 35.9–50)
HCT VFR BLD AUTO: 36 % (ref 42–52)
HGB BLD-MCNC: 12.6 G/DL (ref 14–18)
IMM GRANULOCYTES # BLD AUTO: 0.01 K/UL (ref 0–0.11)
IMM GRANULOCYTES NFR BLD AUTO: 0.2 % (ref 0–0.9)
LYMPHOCYTES # BLD AUTO: 1.69 K/UL (ref 1–4.8)
LYMPHOCYTES NFR BLD: 31.9 % (ref 22–41)
MCH RBC QN AUTO: 34.8 PG (ref 27–33)
MCHC RBC AUTO-ENTMCNC: 35 G/DL (ref 33.7–35.3)
MCV RBC AUTO: 99.4 FL (ref 81.4–97.8)
MONOCYTES # BLD AUTO: 0.58 K/UL (ref 0–0.85)
MONOCYTES NFR BLD AUTO: 10.9 % (ref 0–13.4)
NEUTROPHILS # BLD AUTO: 2.92 K/UL (ref 1.82–7.42)
NEUTROPHILS NFR BLD: 55.1 % (ref 44–72)
NRBC # BLD AUTO: 0 K/UL
NRBC BLD-RTO: 0 /100 WBC
PLATELET # BLD AUTO: 123 K/UL (ref 164–446)
PMV BLD AUTO: 8.1 FL (ref 9–12.9)
RBC # BLD AUTO: 3.62 M/UL (ref 4.7–6.1)
WBC # BLD AUTO: 5.3 K/UL (ref 4.8–10.8)

## 2020-07-14 PROCEDURE — 85025 COMPLETE CBC W/AUTO DIFF WBC: CPT

## 2020-07-14 PROCEDURE — 82955 ASSAY OF G6PD ENZYME: CPT

## 2020-07-14 PROCEDURE — 86235 NUCLEAR ANTIGEN ANTIBODY: CPT

## 2020-07-14 PROCEDURE — 99214 OFFICE O/P EST MOD 30 MIN: CPT | Performed by: INTERNAL MEDICINE

## 2020-07-14 PROCEDURE — 36415 COLL VENOUS BLD VENIPUNCTURE: CPT

## 2020-07-14 RX ORDER — TEMAZEPAM 15 MG/1
15 CAPSULE ORAL NIGHTLY PRN
Status: ON HOLD | COMMUNITY
End: 2020-09-03

## 2020-07-14 RX ORDER — VIT C/B6/B5/MAGNESIUM/HERB 173 50-5-6-5MG
CAPSULE ORAL 2 TIMES DAILY
COMMUNITY

## 2020-07-14 ASSESSMENT — FIBROSIS 4 INDEX: FIB4 SCORE: 2.8

## 2020-07-14 NOTE — PROGRESS NOTES
Chief Complaint- joint pain     Subjective:   Martin Briceño is a 75 y.o. male here today for follow up of rheumatological issues     This is a follow-up visit for this patient who we see in this clinic for Sjogren's syndrome past diagnosis.  Patient previously followed by Dr. Dunbar.  Patient is currently on Plaquenil 200 mg p.o. twice daily without any overt symptoms of Sjogren's.  However patient does come in today with 2 pages of symptoms that he would like to discuss.  Symptoms include intermittent headaches, dizziness, fatigue, GI symptoms, patient denies any vision changes, denies any swelling of the parotid glands, denies any tooth loss, denies any angular chelitis, denies any oral ulcerations.  Patient does complain of dry eyes.  Patient denies any side effects from the medication, denies any unexplained weight loss, denies any fevers of unknown etiology, denies any GI upset, denies any rashes, denies any new joint swelling, denies recurrent infections.  Of note recent sedimentation rate equals 4 May 2020.  Also note patient's Sjogren serologies negative since August 2017.  Ophthalmology evaluation done February 2020 at [facility]    Patient states that he feels he is gluten intolerant currently is on a gluten-free diet and feels that symptoms have improved.    Of note patient does have a long history of symptoms of chronic fatigue since his teenage years.     Additional comorbidities include a history of hyperparathyroidism status post surgical resection of 3 out of the 4 parathyroids and is followed by his PCP.  Patient has a long history of thrombocytopenia followed by his hematologist.  Patient also with history of TIAs in 2007 2008, patient also hypothyroid past diagnosis of Hashimoto's.     Of note, patient does use THC products on a regular basis, and also takes a benzo diazepam nightly, as this is considered high risk medication use and high risk recreational drug use, we will not be  able to prescribe any narcotic pain medications for this patient in this clinic.    Addendum 9/23/2020  Patient notified us that he is to start chemotherapy for pancreatic cancer.  Advised patient to stop Plaquenil, and do not restart until reevaluation after his chemo.    Addendum 8/3/2020  G6PD 10.1 nl 8/2020    Addendum 7/17/2020  SSA neg 7/2020  SSB neg 7/2020     G6PD 12.2 adequate per Dr Pemberton notes 9/20/2018; G6PD 9.8 sl low (<9.9) 7/2020  HBsAg/HbcAb neg 9/2018 [facility]  HCV neg 9/2018 [facility]  FLAVIO neg 8/2017 [facility]  RF neg 8/2017  [facility]  SSA neg 8/2017  [facility]; SSA-52/SSA-60 neg 10/2019 [facility]; SSA neg 5/2020  SSB 36.6 8/2017  [facility]; SS-B neg 10/2019 [facility]; SSB neg 5/2020    SP-1 neg 8/2017  [facility]     Current medicines (including changes today)  Current Outpatient Medications   Medication Sig Dispense Refill   • temazepam (RESTORIL) 15 MG Cap Take 15 mg by mouth at bedtime as needed for Sleep.     • Turmeric 500 MG Cap Take  by mouth.     • hydroxychloroquine (PLAQUENIL) 200 MG Tab TAKE 1 TABLET BY MOUTH TWICE A  Tab 1   • zolpidem (AMBIEN) 10 MG Tab 5 mg.     • oxyCODONE-acetaminophen (PERCOCET-10)  MG Tab      • fluorometholone (FLAREX) 0.1 % ophthalmic suspension Flarex 0.1 % eye drops,suspension     • cyclobenzaprine (FLEXERIL) 5 MG tablet cyclobenzaprine 5 mg tablet     • calcium carbonate (TUMS) 500 MG Chew Tab Take 500 mg by mouth every day.     • Non Formulary Request HYDOEYE FOR DRY EYES     • azelastine (ASTELIN) 137 MCG/SPRAY nasal spray [place] 1 Spray in nose 2 times a day. (Patient taking differently: Spray 1 Spray in nose every evening.) 30 mL 2   • albuterol 108 (90 Base) MCG/ACT Aero Soln inhalation aerosol Inhale 2 Puffs by mouth every four hours as needed for Shortness of Breath. 1 Inhaler 2   • atorvastatin (LIPITOR) 20 MG Tab Take 1 Tab by  mouth every day. 90 Tab 3   • levothyroxine (SYNTHROID) 125 MCG Tab Take 1 Tab by mouth Every morning on an empty stomach. 90 Tab 3   • liothyronine (CYTOMEL) 5 MCG Tab Take 1 Tab by mouth every day. 90 Tab 3   • aspirin-dipyridamole (AGGRENOX)  MG CAPSULE SR 12 HR Take 1 Cap by mouth 2 times a day. 180 Cap 3   • losartan (COZAAR) 50 MG Tab Take 1 Tab by mouth every day. (Patient taking differently: Take 50 mg by mouth every evening.) 90 Tab 3   • omeprazole (PRILOSEC) 40 MG delayed-release capsule Take 1 Cap by mouth every day. (Patient taking differently: Take 40 mg by mouth 2 times a day.) 90 Cap 3   • cycloSPORINE, PF, (CEQUA) 0.09 % Solution by Ophthalmic route.     • Ascorbic Acid (VITAMIN C) 1000 MG Tab Take 1,000 mg by mouth.     • Lifitegrast (XIIDRA) 5 % Solution      • fluticasone (FLONASE) 50 MCG/ACT nasal spray Spray 1 Spray in nose 2 times a day.     • tadalafil (CIALIS) 10 MG tablet Take 10 mg by mouth as needed for Erectile Dysfunction.     • Bepotastine Besilate (BEPREVE) 1.5 % Solution by Ophthalmic route. Seasonal eye drops for dry eyes     • Cyanocobalamin (B-12) 1000 MCG Cap Take  by mouth.     • cholecalciferol (D-3-5) 5000 UNIT Cap Take 6,000 Units by mouth every day.     • Coenzyme Q10 (COQ10) 400 MG Cap Take  by mouth.       No current facility-administered medications for this visit.      He  has a past medical history of Chronic pain, Heart burn, High cholesterol, Hypertension, Indigestion, Post-nasal drip, Sjogren's syndrome (HCC), Stroke ([facility]) (2007), and Unspecified disorder of thyroid.    ROS   Other than what is mentioned in HPI or physical exam, there is no history of headaches, double vision or blurred vision. No temporal tenderness or jaw claudication. No trouble swallowing difficulties or sore throats.  No chest complaints including chest pain, cough or sputum production. No GI complaints including nausea, vomiting, change in bowel habits, or past peptic ulcer disease. No  history of blood in the stools. No urinary complaints including dysuria or frequency. No history of alopecia, photosensitivity, oral ulcerations, Raynaud's phenomena.       Objective:     /60   Pulse 62   Temp 36.3 °C (97.4 °F) (Temporal)   Resp 14   Wt 73 kg (161 lb)   SpO2 97%  Body mass index is 23.1 kg/m².   Physical Exam:    Constitutional: Alert and oriented X3, patient is talkative with good eye contact.Skin: Warm, dry, good turgor, no rashes in visible areas.Eye: Equal, round and reactive, conjunctiva clear, lids normal EOM intactENMT: Lips without lesions, good dentition, no oropharyngeal ulcers, moist buccal mucosa, pinna without deformity, no angular chelitis, no parotid enlargement, neck: Trachea midline, no masses, no thyromegaly.Lymph:  No cervical lymphadenopathy, no axillary lymphadenopathy, no supraclavicular lymphadenopathyRespiratory: Unlabored respiratory effort, lungs clear to auscultation, no wheezes, no ronchi.Cardiovascular: Normal S1, S2, no murmur, no edema.Abdomen: Soft, non-tender, no masses, no hepatosplenomegaly.Psych: Alert and oriented x3, normal affect and mood.Neuro: Cranial nerves 2-12 are grossly intact, no loss of sensation LEExt:no joint laxity noted in bilateral arms, no joint laxity noted in bilateral legs, no sausage digits no dactylitis    Lab Results   Component Value Date/Time    SODIUM 136 07/13/2020 02:18 PM    POTASSIUM 4.1 07/13/2020 02:18 PM    CHLORIDE 98 07/13/2020 02:18 PM    CO2 27 07/13/2020 02:18 PM    GLUCOSE 92 07/13/2020 02:18 PM    BUN 14 07/13/2020 02:18 PM    CREATININE 0.88 07/13/2020 02:18 PM      Lab Results   Component Value Date/Time    WBC 4.1 (L) 05/19/2020 08:15 AM    RBC 3.77 (L) 05/19/2020 08:15 AM    HEMOGLOBIN 13.1 (L) 05/19/2020 08:15 AM    HEMATOCRIT 38.1 (L) 05/19/2020 08:15 AM    .1 (H) 05/19/2020 08:15 AM    MCH 34.7 (H) 05/19/2020 08:15 AM    MCHC 34.4 05/19/2020 08:15 AM    MPV 8.3 (L) 05/19/2020 08:15 AM    NEUTSPOLYS  55.40 05/19/2020 08:15 AM    LYMPHOCYTES 29.80 05/19/2020 08:15 AM    MONOCYTES 12.40 05/19/2020 08:15 AM    EOSINOPHILS 1.20 05/19/2020 08:15 AM    BASOPHILS 0.70 05/19/2020 08:15 AM      Lab Results   Component Value Date/Time    CALCIUM 8.9 07/13/2020 02:18 PM    ASTSGOT 18 05/19/2020 08:15 AM    ALTSGPT 13 05/19/2020 08:15 AM    ALKPHOSPHAT 51 05/19/2020 08:15 AM    TBILIRUBIN 0.7 05/19/2020 08:15 AM    ALBUMIN 4.4 05/19/2020 08:15 AM    TOTPROTEIN 6.9 05/19/2020 08:15 AM     Lab Results   Component Value Date/Time    ANTISSBSJ 0 05/19/2020 08:15 AM     Lab Results   Component Value Date/Time    SEDRATEWES 4 05/19/2020 08:15 AM        Assessment and Plan:     1. Sjogren's syndrome, with unspecified organ involvement (HCC)  Without overt symptoms, currently on Plaquenil at 200 mg p.o. twice daily, will continue as such for now,  - CBC WITH DIFFERENTIAL; Future  - SSA 52 AND 60 (RO)(RAJ) AB, IGG; Future  - SSB(LA)(RAJ)IGG AB; Future  - MR-BRAIN-W/O; Future    2. Long-term use of Plaquenil  Currently on Plaquenil at 200 mg p.o. twice daily  We need to check G6PD levels  Patient needs monitoring labs every 6 months, next labs due about November 2020.  Patient also needs ophthalmology evaluation every year, patient had ophthalmology evaluation February 2020 [facility]  Patient also slightly elevated MCV x1, recheck, if MCV elevated to consider Plaquenil as a possible cause we may need to stop the Plaquenil  - CBC WITH DIFFERENTIAL; Future  - SSA 52 AND 60 (RO)(RAJ) AB, IGG; Future  - SSB(LA)(RAJ)IGG AB; Future  - MR-BRAIN-W/O; Future    3. Thyroiditis  Currently on thyroid medication  Can exacerbate joint pain, I recommend monitoring thyroid on a regular basis to assure it is not contributing to the patient's joint pain  - CBC WITH DIFFERENTIAL; Future  - SSA 52 AND 60 (RO)(RAJ) AB, IGG; Future  - SSB(LA)(RAJ)IGG AB; Future    4. Hyperparathyroidism (HCC)  Followed by endocrinology, calcium levels  stable    5. Diverticulitis  Currently followed by GI    6. Gluten intolerance  Followed by GI, patient currently following a gluten-free diet    7. History of TIA (transient ischemic attack)  New history information today, patient states he is having intermittent headaches and occasional dizziness, will schedule a brain MRI to rule out any pathology  - MR-BRAIN-W/O; Future    8. Cluster headache, not intractable, unspecified chronicity pattern  Schedule brain MRI for evaluation of any pathology  - MR-BRAIN-W/O; Future    Followup: Return in about 6 months (around 1/14/2021). or sooner enedina Briceño  was seen 30 minutes face-to-face of which more than 50% of the time was spent counseling the patient (excluding time for procedures)  regarding  rheumatological condition and care. Therapy was discussed in detail.      Please note that this dictation was created using voice recognition software. I have made every reasonable attempt to correct obvious errors, but I expect that there are errors of grammar and possibly content that I did not discover before finalizing the note.

## 2020-07-15 DIAGNOSIS — Z86.73 HISTORY OF TIA (TRANSIENT ISCHEMIC ATTACK): ICD-10-CM

## 2020-07-15 DIAGNOSIS — G44.009 CLUSTER HEADACHE, NOT INTRACTABLE, UNSPECIFIED CHRONICITY PATTERN: ICD-10-CM

## 2020-07-15 DIAGNOSIS — M35.00 SJOGREN'S SYNDROME, WITH UNSPECIFIED ORGAN INVOLVEMENT (HCC): ICD-10-CM

## 2020-07-17 LAB
ENA SS-B IGG SER IA-ACNC: 0 AU/ML (ref 0–40)
G6PD RBC-CCNC: 9.8 U/G HB (ref 9.9–16.6)
SSA52 R0ENA AB IGG Q0420: 3 AU/ML (ref 0–40)
SSA60 R0ENA AB IGG Q0419: 1 AU/ML (ref 0–40)

## 2020-07-21 ENCOUNTER — OFFICE VISIT (OUTPATIENT)
Dept: MEDICAL GROUP | Facility: MEDICAL CENTER | Age: 75
End: 2020-07-21
Payer: MEDICARE

## 2020-07-21 ENCOUNTER — TELEPHONE (OUTPATIENT)
Dept: MEDICAL GROUP | Facility: MEDICAL CENTER | Age: 75
End: 2020-07-21

## 2020-07-21 VITALS
OXYGEN SATURATION: 94 % | BODY MASS INDEX: 23.19 KG/M2 | TEMPERATURE: 98.6 F | HEIGHT: 70 IN | SYSTOLIC BLOOD PRESSURE: 96 MMHG | DIASTOLIC BLOOD PRESSURE: 58 MMHG | WEIGHT: 162 LBS | HEART RATE: 61 BPM

## 2020-07-21 DIAGNOSIS — G89.29 CHRONIC NONINTRACTABLE HEADACHE, UNSPECIFIED HEADACHE TYPE: ICD-10-CM

## 2020-07-21 DIAGNOSIS — B07.0 PLANTAR WART OF LEFT FOOT: ICD-10-CM

## 2020-07-21 DIAGNOSIS — I10 ESSENTIAL HYPERTENSION: ICD-10-CM

## 2020-07-21 DIAGNOSIS — R51.9 CHRONIC NONINTRACTABLE HEADACHE, UNSPECIFIED HEADACHE TYPE: ICD-10-CM

## 2020-07-21 DIAGNOSIS — F40.240 CLAUSTROPHOBIA: ICD-10-CM

## 2020-07-21 PROBLEM — R09.81 NASAL CONGESTION: Status: ACTIVE | Noted: 2020-07-21

## 2020-07-21 PROCEDURE — 99214 OFFICE O/P EST MOD 30 MIN: CPT | Mod: 25 | Performed by: FAMILY MEDICINE

## 2020-07-21 PROCEDURE — 17110 DESTRUCTION B9 LES UP TO 14: CPT | Performed by: FAMILY MEDICINE

## 2020-07-21 RX ORDER — DIAZEPAM 10 MG/1
10 TABLET ORAL
Qty: 1 TAB | Refills: 0 | Status: SHIPPED | OUTPATIENT
Start: 2020-07-21 | End: 2020-07-22

## 2020-07-21 ASSESSMENT — FIBROSIS 4 INDEX: FIB4 SCORE: 2.96

## 2020-07-21 NOTE — PROGRESS NOTES
Subjective:   Martin Briceño is a 75 y.o. male here today for wart    Plantar wart of left foot  Has medial left foot wart that has been present for weeks.    Essential hypertension  Patient is on losartan 50 mg daily since having TIA years ago. After taking medications in the morning he feels significant dizziness/lightheadedness.  He has lost 10 pounds in the last 4 months through healthy diet.    Chronic nonintractable headache  Patient is having chronic headaches that has not improved over the last 4 months.         Current medicines (including changes today)  Current Outpatient Medications   Medication Sig Dispense Refill   • diazepam (VALIUM) 10 MG tablet Take 1 Tab by mouth Pre-Op Once PRN (MRI) for up to 1 day. 1 Tab 0   • temazepam (RESTORIL) 15 MG Cap Take 15 mg by mouth at bedtime as needed for Sleep.     • Turmeric 500 MG Cap Take  by mouth.     • hydroxychloroquine (PLAQUENIL) 200 MG Tab TAKE 1 TABLET BY MOUTH TWICE A  Tab 1   • zolpidem (AMBIEN) 10 MG Tab 5 mg.     • oxyCODONE-acetaminophen (PERCOCET-10)  MG Tab      • fluorometholone (FLAREX) 0.1 % ophthalmic suspension Flarex 0.1 % eye drops,suspension     • calcium carbonate (TUMS) 500 MG Chew Tab Take 500 mg by mouth every day.     • Non Formulary Request HYDOEYE FOR DRY EYES     • azelastine (ASTELIN) 137 MCG/SPRAY nasal spray [place] 1 Spray in nose 2 times a day. (Patient taking differently: Spray 1 Spray in nose every evening.) 30 mL 2   • albuterol 108 (90 Base) MCG/ACT Aero Soln inhalation aerosol Inhale 2 Puffs by mouth every four hours as needed for Shortness of Breath. 1 Inhaler 2   • atorvastatin (LIPITOR) 20 MG Tab Take 1 Tab by mouth every day. 90 Tab 3   • levothyroxine (SYNTHROID) 125 MCG Tab Take 1 Tab by mouth Every morning on an empty stomach. 90 Tab 3   • liothyronine (CYTOMEL) 5 MCG Tab Take 1 Tab by mouth every day. 90 Tab 3   • aspirin-dipyridamole (AGGRENOX)  MG CAPSULE SR 12 HR Take 1 Cap by mouth 2  "times a day. 180 Cap 3   • omeprazole (PRILOSEC) 40 MG delayed-release capsule Take 1 Cap by mouth every day. (Patient taking differently: Take 40 mg by mouth 2 times a day.) 90 Cap 3   • cycloSPORINE, PF, (CEQUA) 0.09 % Solution by Ophthalmic route.     • Ascorbic Acid (VITAMIN C) 1000 MG Tab Take 1,000 mg by mouth.     • Lifitegrast (XIIDRA) 5 % Solution      • fluticasone (FLONASE) 50 MCG/ACT nasal spray Spray 1 Spray in nose 2 times a day.     • tadalafil (CIALIS) 10 MG tablet Take 10 mg by mouth as needed for Erectile Dysfunction.     • Bepotastine Besilate (BEPREVE) 1.5 % Solution by Ophthalmic route. Seasonal eye drops for dry eyes     • Cyanocobalamin (B-12) 1000 MCG Cap Take  by mouth.     • cholecalciferol (D-3-5) 5000 UNIT Cap Take 6,000 Units by mouth every day.     • Coenzyme Q10 (COQ10) 400 MG Cap Take  by mouth.       No current facility-administered medications for this visit.      He  has a past medical history of Chronic pain, Heart burn, High cholesterol, Hypertension, Indigestion, Post-nasal drip, Sjogren's syndrome (HCC), Stroke ([facility]) (2007), and Unspecified disorder of thyroid.    ROS   No chest pain, no shortness of breath, no abdominal pain       Objective:     BP (!) 96/58   Pulse 61   Temp 37 °C (98.6 °F) (Temporal)   Ht 1.778 m (5' 10\")   Wt 73.5 kg (162 lb)   SpO2 94%  Body mass index is 23.24 kg/m².   Physical Exam:  Constitutional: Alert, no distress.  Skin: Warm, dry, good turgor, no rashes in visible areas.  Eye: Equal, round and reactive, conjunctiva clear, lids normal.  Psych: Alert and oriented x3, normal affect and mood.  Left foot: Medial aspect of foot with circular callus.    CRYOTHERAPY:  Discussed risks and benefits of cryotherapy. Patient verbally agreed. 3 applications of cryotherapy were applied to 1 lesion on left foot. Patient tolerated procedure well. Aftercare instructions given.        Assessment and Plan:   The following treatment plan was discussed    1. " Plantar wart of left foot  Treated with cryotherapy.    2. Chronic nonintractable headache, unspecified headache type  MRI of the brain ordered to evaluate persistent headache  - MR-BRAIN-W/O; Future    3. Claustrophobia  - diazepam (VALIUM) 10 MG tablet; Take 1 Tab by mouth Pre-Op Once PRN (MRI) for up to 1 day.  Dispense: 1 Tab; Refill: 0    4. Essential hypertension  Uncontrolled with low BP. Discontinue losartan, message in 2 weeks. If still having symptoms consider discontinuing further medications.      Followup: Return if symptoms worsen or fail to improve.

## 2020-07-21 NOTE — TELEPHONE ENCOUNTER
Pharmacy called asking if patient is suppose to be taking the Tamazipam and the Zolpidam. Patient states that he takes the Tamazipam daily and a quarter of the Zolpidam as needed. Dr. Zamudio says that this is okay and pharmacy can fill both.

## 2020-07-21 NOTE — ASSESSMENT & PLAN NOTE
Patient is on losartan 50 mg daily since having TIA years ago. After taking medications in the morning he feels significant dizziness/lightheadedness.  He has lost 10 pounds in the last 4 months through healthy diet.

## 2020-07-23 ENCOUNTER — APPOINTMENT (OUTPATIENT)
Dept: RADIOLOGY | Facility: MEDICAL CENTER | Age: 75
End: 2020-07-23
Attending: FAMILY MEDICINE
Payer: MEDICARE

## 2020-07-23 ENCOUNTER — APPOINTMENT (OUTPATIENT)
Dept: RADIOLOGY | Facility: MEDICAL CENTER | Age: 75
End: 2020-07-23
Attending: PHYSICAL MEDICINE & REHABILITATION
Payer: MEDICARE

## 2020-07-23 ENCOUNTER — TELEPHONE (OUTPATIENT)
Dept: MEDICAL GROUP | Facility: MEDICAL CENTER | Age: 75
End: 2020-07-23

## 2020-07-23 DIAGNOSIS — K86.9 PANCREATIC LESION: ICD-10-CM

## 2020-07-23 DIAGNOSIS — K86.89 PANCREATIC MASS: ICD-10-CM

## 2020-07-23 DIAGNOSIS — N28.9 RENAL LESION: ICD-10-CM

## 2020-07-23 DIAGNOSIS — M54.50 LOW BACK PAIN, UNSPECIFIED BACK PAIN LATERALITY, UNSPECIFIED CHRONICITY, UNSPECIFIED WHETHER SCIATICA PRESENT: ICD-10-CM

## 2020-07-23 PROCEDURE — 72148 MRI LUMBAR SPINE W/O DYE: CPT

## 2020-07-23 PROCEDURE — 700117 HCHG RX CONTRAST REV CODE 255: Performed by: FAMILY MEDICINE

## 2020-07-23 PROCEDURE — A9576 INJ PROHANCE MULTIPACK: HCPCS | Performed by: FAMILY MEDICINE

## 2020-07-23 PROCEDURE — 74183 MRI ABD W/O CNTR FLWD CNTR: CPT

## 2020-07-23 RX ADMIN — GADOTERIDOL 15 ML: 279.3 INJECTION, SOLUTION INTRAVENOUS at 10:42

## 2020-07-23 NOTE — TELEPHONE ENCOUNTER
Reviewed MRI results of abdomen with patient over the phone.  Patient has a irregular slightly enlarging from March pancreatic tail mass.  Referral to intake oncology coordinator team.  Vasu Zamudio M.D.

## 2020-07-28 ENCOUNTER — OFFICE VISIT (OUTPATIENT)
Dept: HEMATOLOGY ONCOLOGY | Facility: MEDICAL CENTER | Age: 75
End: 2020-07-28
Payer: MEDICARE

## 2020-07-28 ENCOUNTER — HOSPITAL ENCOUNTER (OUTPATIENT)
Dept: LAB | Facility: MEDICAL CENTER | Age: 75
End: 2020-07-28
Attending: INTERNAL MEDICINE
Payer: MEDICARE

## 2020-07-28 VITALS
RESPIRATION RATE: 16 BRPM | TEMPERATURE: 98.3 F | WEIGHT: 160.94 LBS | DIASTOLIC BLOOD PRESSURE: 72 MMHG | HEIGHT: 70 IN | SYSTOLIC BLOOD PRESSURE: 110 MMHG | BODY MASS INDEX: 23.04 KG/M2 | HEART RATE: 82 BPM | OXYGEN SATURATION: 97 %

## 2020-07-28 DIAGNOSIS — R93.89 ABNORMAL MRI: ICD-10-CM

## 2020-07-28 DIAGNOSIS — M35.00 SJOGREN'S SYNDROME, WITH UNSPECIFIED ORGAN INVOLVEMENT (HCC): ICD-10-CM

## 2020-07-28 DIAGNOSIS — K86.89 PANCREATIC MASS: ICD-10-CM

## 2020-07-28 DIAGNOSIS — Z79.899 LONG-TERM USE OF PLAQUENIL: ICD-10-CM

## 2020-07-28 LAB
ANION GAP SERPL CALC-SCNC: 12 MMOL/L (ref 7–16)
BASOPHILS # BLD AUTO: 0.7 % (ref 0–1.8)
BASOPHILS # BLD: 0.04 K/UL (ref 0–0.12)
BUN SERPL-MCNC: 15 MG/DL (ref 8–22)
CALCIUM SERPL-MCNC: 8.8 MG/DL (ref 8.4–10.2)
CHLORIDE SERPL-SCNC: 101 MMOL/L (ref 96–112)
CO2 SERPL-SCNC: 25 MMOL/L (ref 20–33)
CREAT SERPL-MCNC: 0.93 MG/DL (ref 0.5–1.4)
EOSINOPHIL # BLD AUTO: 0.09 K/UL (ref 0–0.51)
EOSINOPHIL NFR BLD: 1.6 % (ref 0–6.9)
ERYTHROCYTE [DISTWIDTH] IN BLOOD BY AUTOMATED COUNT: 42.6 FL (ref 35.9–50)
FASTING STATUS PATIENT QL REPORTED: NORMAL
GLUCOSE SERPL-MCNC: 98 MG/DL (ref 65–99)
HCT VFR BLD AUTO: 34.8 % (ref 42–52)
HGB BLD-MCNC: 12.2 G/DL (ref 14–18)
IMM GRANULOCYTES # BLD AUTO: 0.02 K/UL (ref 0–0.11)
IMM GRANULOCYTES NFR BLD AUTO: 0.4 % (ref 0–0.9)
LYMPHOCYTES # BLD AUTO: 1.53 K/UL (ref 1–4.8)
LYMPHOCYTES NFR BLD: 27.9 % (ref 22–41)
MCH RBC QN AUTO: 34.7 PG (ref 27–33)
MCHC RBC AUTO-ENTMCNC: 35.1 G/DL (ref 33.7–35.3)
MCV RBC AUTO: 98.9 FL (ref 81.4–97.8)
MONOCYTES # BLD AUTO: 0.7 K/UL (ref 0–0.85)
MONOCYTES NFR BLD AUTO: 12.8 % (ref 0–13.4)
NEUTROPHILS # BLD AUTO: 3.1 K/UL (ref 1.82–7.42)
NEUTROPHILS NFR BLD: 56.6 % (ref 44–72)
NRBC # BLD AUTO: 0 K/UL
NRBC BLD-RTO: 0 /100 WBC
PLATELET # BLD AUTO: 115 K/UL (ref 164–446)
PMV BLD AUTO: 7.8 FL (ref 9–12.9)
POTASSIUM SERPL-SCNC: 3.9 MMOL/L (ref 3.6–5.5)
RBC # BLD AUTO: 3.52 M/UL (ref 4.7–6.1)
SODIUM SERPL-SCNC: 138 MMOL/L (ref 135–145)
WBC # BLD AUTO: 5.5 K/UL (ref 4.8–10.8)

## 2020-07-28 PROCEDURE — 99204 OFFICE O/P NEW MOD 45 MIN: CPT | Performed by: NURSE PRACTITIONER

## 2020-07-28 PROCEDURE — 82955 ASSAY OF G6PD ENZYME: CPT

## 2020-07-28 PROCEDURE — 80048 BASIC METABOLIC PNL TOTAL CA: CPT

## 2020-07-28 PROCEDURE — 85025 COMPLETE CBC W/AUTO DIFF WBC: CPT

## 2020-07-28 PROCEDURE — 36415 COLL VENOUS BLD VENIPUNCTURE: CPT

## 2020-07-28 RX ORDER — ATORVASTATIN CALCIUM 10 MG/1
10 TABLET, FILM COATED ORAL EVERY MORNING
COMMUNITY
End: 2020-09-04 | Stop reason: SDUPTHER

## 2020-07-28 RX ORDER — DICLOFENAC SODIUM 75 MG/1
75 TABLET, DELAYED RELEASE ORAL 2 TIMES DAILY
COMMUNITY
End: 2020-07-31

## 2020-07-28 ASSESSMENT — FIBROSIS 4 INDEX: FIB4 SCORE: 2.96

## 2020-07-28 ASSESSMENT — ENCOUNTER SYMPTOMS
CHILLS: 1
BACK PAIN: 1
INSOMNIA: 0
VOMITING: 0
HEARTBURN: 0
NAUSEA: 0
COUGH: 1
ABDOMINAL PAIN: 1
HEADACHES: 1
SHORTNESS OF BREATH: 0
FEVER: 0
DIARRHEA: 0
PALPITATIONS: 0
TINGLING: 0
BLOOD IN STOOL: 0
MYALGIAS: 0
DIAPHORESIS: 0
CONSTIPATION: 1
DIZZINESS: 1
WHEEZING: 0

## 2020-07-28 ASSESSMENT — PAIN SCALES - GENERAL: PAINLEVEL: NO PAIN

## 2020-07-28 NOTE — PROGRESS NOTES
"Subjective:      Tex Briceño is a 75 y.o. male who presents with New Patient (Medicare/ Pancreatic mass)      HPI   Mr. Briceño was referred to Intake Oncology Coordinator by PCP, Dr. Zamudio for evaluation of abnormal findings on MRI completed 7/23/20, indicating enlarging pancreatic mass. Patient is unaccompanied for today's visit.    Patient reports several years of abdominal discomfort that ultimately seemed addressed with Sjogren's diagnosis in approximately 2016 at which time he initiated Plaquenil. He underwent abdominal CT 2/3/20 for abdominal pain workup with an incidental finding of 9 mm nonspecific low-density lesion on pancreatic tail. Follow up MRI was completed 7/23/20 which showed a hypoenhancing irregular mass in pancreatic tail measuring approximately 3 x 1.8 cm. He subsequently referred to [facility] for further evaluation.    Patient notes that overall baseline fatigue and malaise has improved with dietary changes approximately 5 weeks ago, he is no longer eating dairy or gluten. He has lost an unplanned 15 pounds according to his home scale since fall 2019 and also continues to hike 3-6 miles daily. He has experienced \"flushing\" episodes 4-6 months ago, which started at mid abdomen and rise to the top of his head. He has not had those in the past 2-3 months. He notes low back and thoracic back pain that is better managed with recent addition of diclofenac PRN.    He reports paternal aunt with EtOH related liver cancer but otherwise denies other familial cancer history. He reportedly stopped smoking in 1990.          Allergies   Allergen Reactions   • Mirtazapine Unspecified     Keeps him up    • Tape Itching           Current Outpatient Medications on File Prior to Visit   Medication Sig Dispense Refill   • diclofenac DR (VOLTAREN) 75 MG Tablet Delayed Response Take 75 mg by mouth 2 times a day.     • atorvastatin (LIPITOR) 10 MG Tab Take 10 mg by mouth every evening.     • temazepam (RESTORIL) 15 MG " Cap Take 15 mg by mouth at bedtime as needed for Sleep.     • Turmeric 500 MG Cap Take  by mouth.     • hydroxychloroquine (PLAQUENIL) 200 MG Tab TAKE 1 TABLET BY MOUTH TWICE A  Tab 1   • zolpidem (AMBIEN) 10 MG Tab 5 mg.     • oxyCODONE-acetaminophen (PERCOCET-10)  MG Tab      • fluorometholone (FLAREX) 0.1 % ophthalmic suspension Flarex 0.1 % eye drops,suspension     • calcium carbonate (TUMS) 500 MG Chew Tab Take 500 mg by mouth every day.     • Non Formulary Request HYDOEYE FOR DRY EYES     • azelastine (ASTELIN) 137 MCG/SPRAY nasal spray [place] 1 Spray in nose 2 times a day. (Patient taking differently: Spray 1 Spray in nose every evening.) 30 mL 2   • albuterol 108 (90 Base) MCG/ACT Aero Soln inhalation aerosol Inhale 2 Puffs by mouth every four hours as needed for Shortness of Breath. 1 Inhaler 2   • atorvastatin (LIPITOR) 20 MG Tab Take 1 Tab by mouth every day. 90 Tab 3   • levothyroxine (SYNTHROID) 125 MCG Tab Take 1 Tab by mouth Every morning on an empty stomach. 90 Tab 3   • liothyronine (CYTOMEL) 5 MCG Tab Take 1 Tab by mouth every day. 90 Tab 3   • aspirin-dipyridamole (AGGRENOX)  MG CAPSULE SR 12 HR Take 1 Cap by mouth 2 times a day. 180 Cap 3   • omeprazole (PRILOSEC) 40 MG delayed-release capsule Take 1 Cap by mouth every day. (Patient taking differently: Take 40 mg by mouth 2 times a day.) 90 Cap 3   • cycloSPORINE, PF, (CEQUA) 0.09 % Solution by Ophthalmic route.     • Ascorbic Acid (VITAMIN C) 1000 MG Tab Take 1,000 mg by mouth.     • Lifitegrast (XIIDRA) 5 % Solution      • fluticasone (FLONASE) 50 MCG/ACT nasal spray Spray 1 Spray in nose 2 times a day.     • tadalafil (CIALIS) 10 MG tablet Take 10 mg by mouth as needed for Erectile Dysfunction.     • Bepotastine Besilate (BEPREVE) 1.5 % Solution by Ophthalmic route. Seasonal eye drops for dry eyes     • Cyanocobalamin (B-12) 1000 MCG Cap Take  by mouth.     • cholecalciferol (D-3-5) 5000 UNIT Cap Take 6,000 Units by mouth  "every day.     • Coenzyme Q10 (COQ10) 400 MG Cap Take  by mouth.       No current facility-administered medications on file prior to visit.            Review of Systems   Constitutional: Positive for chills (since dx of Sjogren's), malaise/fatigue (improved with diet changes 5 weeks ago (no dairy or gluten)) and weight loss (175 lbs in 10/2019 (160# today), unknown reason although hikes 3-6 miles daily; having a hard time keeping weight on right now). Negative for diaphoresis (\"flushing\" episodes in the past 6 months; mid abd to top head, not recently but there was a series in the past few months) and fever.   HENT:        S/p partial (not quite full) thyroidectomy 2001 - on synthroid/cytomel   Eyes:        Dry eye & monovision   Respiratory: Positive for cough (post nasal drip). Negative for shortness of breath and wheezing.    Cardiovascular: Negative for chest pain, palpitations and leg swelling.   Gastrointestinal: Positive for abdominal pain (intermittent abd pain if eats too quickly; stopped dairy and gluten 5 weeks ago with improved symptom; worse later in the day), constipation (low volume, multiple times per day, slow transit - discomfort is better but consistentcy is the same) and melena (sicne started diclofenic). Negative for blood in stool (dark stool with diclofenic), diarrhea, heartburn (best managed w/ omeprazole BID & diet), nausea and vomiting.        Dx Sjogren's 4 years ago - managed per Rheumatology (w/ Plaquenil); s/p gastric bx 2008 (benign); appt with Dr. Cardenas today for evaluation of hemorrhoid as he has increased rectal mucous   Genitourinary: Negative for dysuria.   Musculoskeletal: Positive for back pain (increased aching since 12/2019, started with diclofenac PO this month w/ improved symptoms but dark stools; left low back pain, some intermittent throacic shooting back pain) and joint pain (left rotator and bicep repair 6/1/2020 (Dr. Peera)). Negative for myalgias.   Neurological: " "Positive for dizziness (\"for a couple years\" better with change in BP med) and headaches (light, intermittent and self limiting). Negative for tingling.   Psychiatric/Behavioral: The patient does not have insomnia.            Past Medical History:   Diagnosis Date   • Chronic pain     lower back   • Heart burn    • High cholesterol    • Hypertension    • Indigestion    • Post-nasal drip    • Sjogren's syndrome (HCC)    • Stroke (HCC)     3 tia's per pt   • Unspecified disorder of thyroid     3/4 of the thyroid removed       Family History   Problem Relation Age of Onset   • COPD Mother    • Heart Disease Father         A. Fib       Social History     Tobacco Use   • Smoking status: Former Smoker     Packs/day: 1.00     Years: 30.00     Pack years: 30.00     Types: Cigarettes     Start date:      Last attempt to quit:      Years since quittin.5   • Smokeless tobacco: Never Used   Substance Use Topics   • Alcohol use: Not Currently     Frequency: Never   • Drug use: Yes     Types: Marijuana, Inhaled     Comment: estela for sleep       Past Surgical History:   Procedure Laterality Date   • PB SHLDR ARTHROSCOP,SURG,W/ROTAT CUFF REPB Left 2020    Procedure: ARTHROSCOPY, SHOULDER, WITH ROTATOR CUFF REPAIR; EXTENSIVE DEBRIDEMENT; SUBSCAPULARIS REPAIR;  Surgeon: Newton Perea M.D.;  Location: [facility];  Service: Orthopedics   • PB SHLDR ARTHROSCOP,PART ACROMIOPLAS Left 2020    Procedure: DECOMPRESSION, SHOULDER, ARTHROSCOPIC - SUBACROMIAL;  Surgeon: Newton Perea M.D.;  Location: [facility];  Service: Orthopedics   • INGUINAL HERNIA LAPAROSCOPIC Left    • SHOULDER ARTHROSCOPY Right     RCR   • GASTROSCOPY WITH BIOPSY  08    Performed by JAYNE RIZO at [facility]   • EGD W/ENDOSCOPIC ULTRASOUND  08    Performed by JAYNE RIZO at [facility]   • THYROIDECTOMY      Partial   • APPENDECTOMY   " "  • HERNIA REPAIR     • OTHER ABDOMINAL SURGERY      appendectomy 1976              Objective:     /72 (BP Location: Right arm, Patient Position: Sitting, BP Cuff Size: Adult)   Pulse 82   Temp 36.8 °C (98.3 °F) (Temporal)   Resp 16   Ht 1.778 m (5' 10\")   Wt 73 kg (160 lb 15 oz)   SpO2 97%   BMI 23.09 kg/m²        Physical Exam  Vitals signs reviewed.   Constitutional:       General: He is not in acute distress.     Appearance: He is well-developed. He is not diaphoretic.   HENT:      Head: Normocephalic and atraumatic.      Mouth/Throat:      Pharynx: No oropharyngeal exudate.   Eyes:      General: No scleral icterus.        Right eye: No discharge.         Left eye: No discharge.      Conjunctiva/sclera: Conjunctivae normal.      Pupils: Pupils are equal, round, and reactive to light.   Neck:      Musculoskeletal: Normal range of motion and neck supple.      Thyroid: No thyromegaly.   Cardiovascular:      Rate and Rhythm: Normal rate and regular rhythm.      Heart sounds: Normal heart sounds. No murmur. No friction rub. No gallop.    Pulmonary:      Effort: Pulmonary effort is normal. No respiratory distress.      Breath sounds: Normal breath sounds. No wheezing.   Abdominal:      General: Bowel sounds are normal. There is no distension or abdominal bruit.      Palpations: Abdomen is soft. There is no hepatomegaly, splenomegaly or mass.      Tenderness: There is no abdominal tenderness. There is no guarding or rebound.      Hernia: No hernia is present.   Musculoskeletal: Normal range of motion.         General: No tenderness.   Lymphadenopathy:      Head:      Right side of head: No submental, submandibular, tonsillar, preauricular, posterior auricular or occipital adenopathy.      Left side of head: No submental, submandibular, tonsillar, preauricular, posterior auricular or occipital adenopathy.      Cervical: No cervical adenopathy.      Right cervical: No superficial, deep or posterior cervical " adenopathy.     Left cervical: No superficial, deep or posterior cervical adenopathy.      Upper Body:      Right upper body: No supraclavicular, axillary, pectoral or epitrochlear adenopathy.      Left upper body: No supraclavicular, axillary, pectoral or epitrochlear adenopathy.      Lower Body: No right inguinal adenopathy. No left inguinal adenopathy.   Skin:     General: Skin is warm and dry.      Coloration: Skin is not jaundiced or pale.      Findings: No erythema or rash.   Neurological:      Mental Status: He is alert and oriented to person, place, and time.   Psychiatric:         Behavior: Behavior normal.      Comments: anxious             Hospital Outpatient Visit on 07/28/2020   Component Date Value Ref Range Status   • WBC 07/28/2020 5.5  4.8 - 10.8 K/uL Final   • RBC 07/28/2020 3.52* 4.70 - 6.10 M/uL Final   • Hemoglobin 07/28/2020 12.2* 14.0 - 18.0 g/dL Final   • Hematocrit 07/28/2020 34.8* 42.0 - 52.0 % Final   • MCV 07/28/2020 98.9* 81.4 - 97.8 fL Final   • MCH 07/28/2020 34.7* 27.0 - 33.0 pg Final   • MCHC 07/28/2020 35.1  33.7 - 35.3 g/dL Final   • RDW 07/28/2020 42.6  35.9 - 50.0 fL Final   • Platelet Count 07/28/2020 115* 164 - 446 K/uL Final   • MPV 07/28/2020 7.8* 9.0 - 12.9 fL Final   • Neutrophils-Polys 07/28/2020 56.60  44.00 - 72.00 % Final   • Lymphocytes 07/28/2020 27.90  22.00 - 41.00 % Final   • Monocytes 07/28/2020 12.80  0.00 - 13.40 % Final   • Eosinophils 07/28/2020 1.60  0.00 - 6.90 % Final   • Basophils 07/28/2020 0.70  0.00 - 1.80 % Final   • Immature Granulocytes 07/28/2020 0.40  0.00 - 0.90 % Final   • Nucleated RBC 07/28/2020 0.00  /100 WBC Final   • Neutrophils (Absolute) 07/28/2020 3.10  1.82 - 7.42 K/uL Final    Includes immature neutrophils, if present.   • Lymphs (Absolute) 07/28/2020 1.53  1.00 - 4.80 K/uL Final   • Monos (Absolute) 07/28/2020 0.70  0.00 - 0.85 K/uL Final   • Eos (Absolute) 07/28/2020 0.09  0.00 - 0.51 K/uL Final   • Baso (Absolute) 07/28/2020 0.04   0.00 - 0.12 K/uL Final   • Immature Granulocytes (abs) 07/28/2020 0.02  0.00 - 0.11 K/uL Final   • NRBC (Absolute) 07/28/2020 0.00  K/uL Final   • Sodium 07/28/2020 138  135 - 145 mmol/L Final   • Potassium 07/28/2020 3.9  3.6 - 5.5 mmol/L Final   • Chloride 07/28/2020 101  96 - 112 mmol/L Final   • Co2 07/28/2020 25  20 - 33 mmol/L Final   • Glucose 07/28/2020 98  65 - 99 mg/dL Final   • Bun 07/28/2020 15  8 - 22 mg/dL Final   • Creatinine 07/28/2020 0.93  0.50 - 1.40 mg/dL Final   • Calcium 07/28/2020 8.8  8.4 - 10.2 mg/dL Final   • Anion Gap 07/28/2020 12.0  7.0 - 16.0 Final   • Fasting Status 07/28/2020 Non-Fasting   Final   • GFR If  07/28/2020 >60  >60 mL/min/1.73 m 2 Final   • GFR If Non  07/28/2020 >60  >60 mL/min/1.73 m 2 Final           MRI  7/23/2020 10:15 AM  HISTORY/REASON FOR EXAM:  Abdominal pain after eating. Subcentimeter pancreatic tail lesion. Indeterminate right renal lesion.     TECHNIQUE/EXAM DESCRIPTION: MRI of the abdomen with dynamic IV gadolinium enhancement.     MR imaging of the abdomen was performed.  MR images of the abdomen were obtained with coronal and axial single-shot fast spin-echo T2, fat-suppressed axial FRFSE T2, axial in-phase and out-of-phase FSPGR T1, axial DWI with a b-value of 600, 3D MRCP,  precontrast fat-suppressed FSPGR T1, dynamic gadolinium enhanced axial fat-suppressed T1 FSPGR in the arterial dominant, portal venous, 2-minute and 4-minute delayed phases, with delayed coronal fat-suppressed T1 FSPGR sequence. .     The study was performed on a Siemens Skyra 3.0 Marycruz MRI scanner.     15 mL ProHance contrast was administered intravenously.     COMPARISON: CT to 3/20/2020     FINDINGS:  Liver: Homogeneous enhancement. There are multiple hepatic cysts. The largest measures approximately 3.7 cm. The remainder of the cysts are less than a centimeter.     Vasculature: The hepatic, portal, superior mesenteric, and splenic veins are  patent.     Gallbladder and biliary system: No intrahepatic or extrahepatic ductal dilatation. The gallbladder is normal in appearance. No choledocholithiasis.     Pancreas: A hypoenhancing irregular mass is poorly defined pancreatic tail, but measures approximately 3 x 1.8 cm. There is mildly increased T2 signal and diffusion restriction. There is a tiny pancreatic body cyst. No pancreatic ductal dilatation.   Ductal anatomy is conventional.     Spleen: Unremarkable.     Kidneys: 1.1 cm cystic mass within the mid to lower left kidney with mildly increased T2 signal, consistent with a complex cyst. No internal enhancement is identified. The kidneys enhance symmetrically.     Adrenal glands: Unremarkable.     Ascites: None.     Lymph nodes: No adenopathy.     Miscellaneous: There are hypertrophic gastric folds, similar to the prior CT.     IMPRESSION:  1.  Hypoenhancing irregular mass in the pancreatic tail may have enlarged from the prior CT. Findings are nonspecific. Possible etiologies include a neuroendocrine tumor or other neoplasm. Focal chronic pancreatitis could have a similar appearance.     2.  Nonspecific thickened gastric folds are seen as on the prior CT, possibly related to patient's history of Sjogren's syndrome.     3.  Complex cyst in the mid to lower left kidney, Bosniak 2. No additional follow-up is recommended.          CT Abd  2/3/2020 10:15 AM  HISTORY/REASON FOR EXAM:  abdominal pain.  Abdominal pain after eating     TECHNIQUE/EXAM DESCRIPTION:   CT scan of the abdomen and pelvis with contrast.     Contrast-enhanced helical scanning was obtained from the diaphragmatic domes through the pubic symphysis following the bolus administration of nonionic contrast without complication.     100 mL of Omnipaque 350 nonionic contrast was administered without complication.     Low dose optimization technique was utilized for this CT exam including automated exposure control and adjustment of the mA and/or  kV according to patient size.     COMPARISON: No prior studies available.     FINDINGS:  Chest Base: Lung bases are clear.     Liver:  3.7 cm benign cyst in the dome of the liver. No follow-up necessary.     Gallbladder: Normal     Biliary tract: Nondilated.     Pancreas: Suggestion of 9 mm nonspecific low-density lesion in the pancreatic tail. This is too small to accurately characterize. Follow-up pancreatic protocol CT or MRI in 6-12 months is suggested.     Spleen: Normal.     Adrenals: Normal.     Kidneys and Collecting Systems:  11 mm low-density lesion in the interpolar right kidney is not simple fluid attenuating. Further characterization with renal ultrasound is suggested. Follow up renal protocol CT or MRI is recommended per consensus guidelines of the 2019 ACR Incidental Findings Committee for the above described cystic renal lesion(s) with reasons described above. MRI is preferred for masses less than 1.5 cm and in younger patients.     Gastrointestinal tract:  No bowel obstruction. There is suggestion of gastric wall thickening involving the fundus than body, somewhat limited in evaluation on CT. Differential considerations would include gastritis or malignancy. Direct inspection is recommended. A somewhat abnormal appearance of the terminal ileum and cecum with heterogeneous density which could be related to intrathecal contents however limited evaluation for mass. Direct inspection in this region could be performed as clinically indicated.  There is colonic diverticulosis without acute diverticulitis. The appendix is normal.     Peritoneum: No free air or free fluid.     Reproductive organs:  Normal.     Bladder:  Normal.     Vessels:  There is moderate atherosclerosis.  Normal caliber vessels.     Lymph  Nodes:  No lymphadenopathy.     Abdominal wall: Small fat-containing bilateral inguinal hernias.     Bones:  17 mm sclerotic lesion in the posterior medial right iliac bone is nonspecific. Chronic  appearing T10 superior endplate compression fracture and sclerosis noted.     IMPRESSION:  1.  Suggestion of prominent gastric fundus/body wall thickening. Differential considerations would include gastritis or malignancy. Gastroenterology consultation and direct inspection is suggested.  2.  Colonic diverticulosis without acute diverticulitis.  3.  9 mm low-density lesion in the pancreatic tail to small to accurately characterize. Follow-up pancreatic protocol MRI in 6-12 months is suggested.  4.  Indeterminate 11 mm low-density lesion in the right kidney which is not simple fluid attenuating. This could be also further characterized with multiphase MRI.  5.  Atherosclerosis.  6.  Small fat-containing inguinal hernias.             Assessment/Plan:       1. Pancreatic mass  REFERRAL TO GASTROENTEROLOGY   2. Abnormal MRI  REFERRAL TO GASTROENTEROLOGY       1.  Pancreatic mass:  Patient with incidental finding of 9 mm nonspecific low-density lesion on pancreatic tail per CT completed 2/3/2020.  Follow-up MRI completed 7/23/2020 showed hypoenhancing irregular mass in pancreatic tail measuring 3 x 1.8 cm.  He was referred to [facility] for expedited work-up.    Discussed plan of care with medical director who recommends ERCP/EUS per gastroenterology. Dr. Youssef has been contacted and referral placed. As patient is already an established patient at [facility] he was readily scheduled for procedure. Patient will return following procedure to review pathology results, sooner as needed.        There was an extensive discussion with patient regarding differential diagnoses: Benign cyst, malignant neoplasm, neuroendocrine tumor, etc. Patient is aware diagnosis and prognosis are pending results of the biopsy. Patient is aware that should pathology show malignancy he will be referred to medical oncology for further evaluation and treatment. He has previously lived and [facility] and if indicated desires referral to providers near [facility]  [facility].        The patient verbalized agreement and understanding of current plan. All questions and concerns were addressed at time of visit.    Please note that this dictation was created using voice recognition software. I have made every reasonable attempt to correct obvious errors, but I expect that there are errors of grammar and possibly content that I did not discover before finalizing the note.

## 2020-07-29 ASSESSMENT — ENCOUNTER SYMPTOMS: WEIGHT LOSS: 1

## 2020-07-30 ENCOUNTER — APPOINTMENT (OUTPATIENT)
Dept: RADIOLOGY | Facility: MEDICAL CENTER | Age: 75
End: 2020-07-30
Attending: FAMILY MEDICINE
Payer: MEDICARE

## 2020-07-30 ENCOUNTER — TELEPHONE (OUTPATIENT)
Dept: MEDICAL GROUP | Facility: MEDICAL CENTER | Age: 75
End: 2020-07-30

## 2020-07-30 DIAGNOSIS — R51.9 CHRONIC NONINTRACTABLE HEADACHE, UNSPECIFIED HEADACHE TYPE: ICD-10-CM

## 2020-07-30 DIAGNOSIS — G89.29 CHRONIC NONINTRACTABLE HEADACHE, UNSPECIFIED HEADACHE TYPE: ICD-10-CM

## 2020-07-30 DIAGNOSIS — I67.1 CEREBROVASCULAR MALFORMATION, DURAL AV FISTULA: ICD-10-CM

## 2020-07-30 LAB — G6PD RBC-CCNC: 10.1 U/G HB (ref 9.9–16.6)

## 2020-07-30 PROCEDURE — 70551 MRI BRAIN STEM W/O DYE: CPT

## 2020-07-30 NOTE — TELEPHONE ENCOUNTER
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 Referral to vascular specialist.  He is having new chronic headaches, new balance problems, tinnitus.    Vasu Zamudio M.D.

## 2020-07-31 DIAGNOSIS — Z01.810 PRE-OPERATIVE CARDIOVASCULAR EXAMINATION: ICD-10-CM

## 2020-07-31 LAB — EKG IMPRESSION: NORMAL

## 2020-07-31 PROCEDURE — 93005 ELECTROCARDIOGRAM TRACING: CPT

## 2020-07-31 PROCEDURE — 93010 ELECTROCARDIOGRAM REPORT: CPT | Performed by: INTERNAL MEDICINE

## 2020-07-31 RX ORDER — LEVOTHYROXINE SODIUM 0.12 MG/1
125 TABLET ORAL DAILY
COMMUNITY
Start: 2020-06-12 | End: 2020-07-31

## 2020-07-31 RX ORDER — ATORVASTATIN CALCIUM 20 MG/1
20 TABLET, FILM COATED ORAL DAILY
COMMUNITY
Start: 2020-06-12 | End: 2020-07-31

## 2020-07-31 RX ORDER — CYCLOSPORINE 0 G/ML
1 SOLUTION/ DROPS OPHTHALMIC; TOPICAL 2 TIMES DAILY
COMMUNITY
Start: 2020-06-13

## 2020-07-31 RX ORDER — HYDROXYCHLOROQUINE SULFATE 200 MG/1
200 TABLET, FILM COATED ORAL 2 TIMES DAILY
COMMUNITY
Start: 2020-06-12 | End: 2020-07-31

## 2020-07-31 ASSESSMENT — FIBROSIS 4 INDEX: FIB4 SCORE: 3.17

## 2020-07-31 NOTE — OR NURSING
Hx and meds reviewed, pre op instructions given. Pt aware may take the listed meds morning of surgery; atorvastatin, eye drops, plaquenil, synthroid, cytomel, albuterol if needed (only used when sick previously), and omeprazole. Has stopped aggrenox per PCP's instructions. Anesthesia fasting guidelines reviewed with pt. Pt states he does not have someone to stay with him post op, aware to let Dr Youssef's office know. I called office to let them know as well.

## 2020-08-03 ENCOUNTER — APPOINTMENT (OUTPATIENT)
Dept: ADMISSIONS | Facility: MEDICAL CENTER | Age: 75
End: 2020-08-03
Attending: INTERNAL MEDICINE
Payer: MEDICARE

## 2020-08-05 ENCOUNTER — ANESTHESIA (OUTPATIENT)
Dept: SURGERY | Facility: MEDICAL CENTER | Age: 75
End: 2020-08-05
Payer: MEDICARE

## 2020-08-05 ENCOUNTER — ANESTHESIA EVENT (OUTPATIENT)
Dept: SURGERY | Facility: MEDICAL CENTER | Age: 75
End: 2020-08-05
Payer: MEDICARE

## 2020-08-05 ENCOUNTER — HOSPITAL ENCOUNTER (OUTPATIENT)
Facility: MEDICAL CENTER | Age: 75
End: 2020-08-05
Attending: INTERNAL MEDICINE | Admitting: INTERNAL MEDICINE
Payer: MEDICARE

## 2020-08-05 VITALS
OXYGEN SATURATION: 98 % | RESPIRATION RATE: 16 BRPM | WEIGHT: 156.97 LBS | DIASTOLIC BLOOD PRESSURE: 86 MMHG | HEART RATE: 54 BPM | HEIGHT: 69 IN | BODY MASS INDEX: 23.25 KG/M2 | TEMPERATURE: 97.2 F | SYSTOLIC BLOOD PRESSURE: 125 MMHG

## 2020-08-05 LAB — PATHOLOGY CONSULT NOTE: NORMAL

## 2020-08-05 PROCEDURE — 160203 HCHG ENDO MINUTES - 1ST 30 MINS LEVEL 4: Performed by: INTERNAL MEDICINE

## 2020-08-05 PROCEDURE — 88172 CYTP DX EVAL FNA 1ST EA SITE: CPT

## 2020-08-05 PROCEDURE — 160048 HCHG OR STATISTICAL LEVEL 1-5: Performed by: INTERNAL MEDICINE

## 2020-08-05 PROCEDURE — 160208 HCHG ENDO MINUTES - EA ADDL 1 MIN LEVEL 4: Performed by: INTERNAL MEDICINE

## 2020-08-05 PROCEDURE — 160009 HCHG ANES TIME/MIN: Performed by: INTERNAL MEDICINE

## 2020-08-05 PROCEDURE — 700102 HCHG RX REV CODE 250 W/ 637 OVERRIDE(OP): Performed by: INTERNAL MEDICINE

## 2020-08-05 PROCEDURE — 88173 CYTOPATH EVAL FNA REPORT: CPT

## 2020-08-05 PROCEDURE — 700111 HCHG RX REV CODE 636 W/ 250 OVERRIDE (IP): Performed by: ANESTHESIOLOGY

## 2020-08-05 PROCEDURE — 88305 TISSUE EXAM BY PATHOLOGIST: CPT

## 2020-08-05 PROCEDURE — 700105 HCHG RX REV CODE 258: Performed by: INTERNAL MEDICINE

## 2020-08-05 PROCEDURE — 160046 HCHG PACU - 1ST 60 MINS PHASE II: Performed by: INTERNAL MEDICINE

## 2020-08-05 PROCEDURE — A9270 NON-COVERED ITEM OR SERVICE: HCPCS | Performed by: INTERNAL MEDICINE

## 2020-08-05 PROCEDURE — 88307 TISSUE EXAM BY PATHOLOGIST: CPT

## 2020-08-05 PROCEDURE — 160035 HCHG PACU - 1ST 60 MINS PHASE I: Performed by: INTERNAL MEDICINE

## 2020-08-05 PROCEDURE — 500066 HCHG BITE BLOCK, ECT: Performed by: INTERNAL MEDICINE

## 2020-08-05 PROCEDURE — 88312 SPECIAL STAINS GROUP 1: CPT

## 2020-08-05 PROCEDURE — 700101 HCHG RX REV CODE 250: Performed by: ANESTHESIOLOGY

## 2020-08-05 PROCEDURE — 160025 RECOVERY II MINUTES (STATS): Performed by: INTERNAL MEDICINE

## 2020-08-05 PROCEDURE — 160002 HCHG RECOVERY MINUTES (STAT): Performed by: INTERNAL MEDICINE

## 2020-08-05 RX ORDER — SODIUM CHLORIDE, SODIUM LACTATE, POTASSIUM CHLORIDE, CALCIUM CHLORIDE 600; 310; 30; 20 MG/100ML; MG/100ML; MG/100ML; MG/100ML
INJECTION, SOLUTION INTRAVENOUS CONTINUOUS
Status: DISCONTINUED | OUTPATIENT
Start: 2020-08-05 | End: 2020-08-05 | Stop reason: HOSPADM

## 2020-08-05 RX ORDER — ONDANSETRON 2 MG/ML
4 INJECTION INTRAMUSCULAR; INTRAVENOUS
Status: DISCONTINUED | OUTPATIENT
Start: 2020-08-05 | End: 2020-08-05 | Stop reason: HOSPADM

## 2020-08-05 RX ORDER — LIDOCAINE HYDROCHLORIDE 20 MG/ML
INJECTION, SOLUTION EPIDURAL; INFILTRATION; INTRACAUDAL; PERINEURAL PRN
Status: DISCONTINUED | OUTPATIENT
Start: 2020-08-05 | End: 2020-08-05 | Stop reason: SURG

## 2020-08-05 RX ORDER — HALOPERIDOL 5 MG/ML
0.5 INJECTION INTRAMUSCULAR
Status: DISCONTINUED | OUTPATIENT
Start: 2020-08-05 | End: 2020-08-05 | Stop reason: HOSPADM

## 2020-08-05 RX ADMIN — PROPOFOL 100 MCG/KG/MIN: 10 INJECTION, EMULSION INTRAVENOUS at 09:24

## 2020-08-05 RX ADMIN — LIDOCAINE HYDROCHLORIDE 100 MG: 20 INJECTION, SOLUTION EPIDURAL; INFILTRATION; INTRACAUDAL; PERINEURAL at 09:23

## 2020-08-05 RX ADMIN — ALFENTANIL HYDROCHLORIDE 100 MCG: 500 INJECTION, SOLUTION INTRAVENOUS at 09:26

## 2020-08-05 RX ADMIN — PROPOFOL 30 MG: 10 INJECTION, EMULSION INTRAVENOUS at 09:23

## 2020-08-05 RX ADMIN — PROPOFOL 30 MG: 10 INJECTION, EMULSION INTRAVENOUS at 09:21

## 2020-08-05 RX ADMIN — ALFENTANIL HYDROCHLORIDE 150 MCG: 500 INJECTION, SOLUTION INTRAVENOUS at 09:32

## 2020-08-05 RX ADMIN — ALFENTANIL HYDROCHLORIDE 100 MCG: 500 INJECTION, SOLUTION INTRAVENOUS at 09:23

## 2020-08-05 RX ADMIN — POVIDONE-IODINE 15 ML: 10 SOLUTION TOPICAL at 09:04

## 2020-08-05 RX ADMIN — SODIUM CHLORIDE, POTASSIUM CHLORIDE, SODIUM LACTATE AND CALCIUM CHLORIDE: 600; 310; 30; 20 INJECTION, SOLUTION INTRAVENOUS at 09:04

## 2020-08-05 RX ADMIN — PROPOFOL 30 MG: 10 INJECTION, EMULSION INTRAVENOUS at 09:25

## 2020-08-05 RX ADMIN — ALFENTANIL HYDROCHLORIDE 50 MCG: 500 INJECTION, SOLUTION INTRAVENOUS at 09:44

## 2020-08-05 ASSESSMENT — FIBROSIS 4 INDEX: FIB4 SCORE: 3.17

## 2020-08-05 ASSESSMENT — PAIN SCALES - GENERAL: PAIN_LEVEL: 0

## 2020-08-05 NOTE — OR NURSING
Patient allergies and NPO status verified, home medication reconciliation completed and belongings secured. Patient verbalizes understanding of pain scale, expected course of stay and plan of care. Surgical site verified with patient. IV access established. Oral and nasal triple aim completed by CNA.

## 2020-08-05 NOTE — ANESTHESIA PREPROCEDURE EVALUATION
Relevant Problems   NEURO   (+) Chronic nonintractable headache      CARDIAC   (+) Essential hypertension      GI   (+) Gastroesophageal reflux disease without esophagitis      ENDO   (+) Hypothyroidism      Other   (+) Anemia of chronic disease   (+) Cerebrovascular malformation, dural AV fistula   (+) Chronic left shoulder pain   (+) Hyperparathyroidism (HCC)   (+) Long-term use of Plaquenil   (+) Pure hypercholesterolemia   (+) Sjogren's syndrome (HCC)       Physical Exam    Airway   Mallampati: II  TM distance: >3 FB  Neck ROM: full       Cardiovascular - normal exam  Rhythm: regular  Rate: normal  (-) murmur  Comments: By palpation of radial artery   Dental - normal exam           Pulmonary   Comments: No concerns   Abdominal    Neurological - normal exam    Comments: A&Ox4, grossly intact             Anesthesia Plan    ASA 3 (Hx of stroke. GERD. Anemia. Pancreatic mass. Sjogren's syndrome.)   ASA physical status 3 criteria: hypertension - poorly controlled    Plan - general             Induction: intravenous    Postoperative Plan: Postoperative administration of opioids is intended.    Pertinent diagnostic labs and testing reviewed    Informed Consent:    Anesthetic plan and risks discussed with patient.    Use of blood products discussed with: patient whom consented to blood products.

## 2020-08-05 NOTE — OR NURSING
0951: To PACU from OR via gurney, awake, denies discomfort, respirations spontaneous and non-labored. Abdo soft, non-tender. O2 d/ilia  0955: commenced po water  1000: no change  1009: s/b Dr Youssef  1015: Tolerates po water. Meets criteria to transfer to Stage 2

## 2020-08-05 NOTE — ANESTHESIA TIME REPORT
Anesthesia Start and Stop Event Times     Date Time Event    8/5/2020 0919 Anesthesia Start     0954 Anesthesia Stop        Responsible Staff  08/05/20    Name Role Begin End    Jeff Alexander M.D. Anesth 0919 0954        Preop Diagnosis (Free Text):  Pre-op Diagnosis     MASS OF PANCREAS, CT OF ABDOMEN ABNORMAL        Preop Diagnosis (Codes):  Diagnosis Information     Diagnosis Code(s): Mass of pancreas [K86.89]        Post op Diagnosis  Mass of pancreas      Premium Reason  Non-Premium    Comments:

## 2020-08-05 NOTE — OR NURSING
1025 Pt arrived from PACU s/p gastroscopy , report received from ELY Razo. Pt breathing unlabored; Denies pain or nausea at this time. Pt changing into home clothes @ bedside with assistance x1.   1050 Discharge instructions and medications gone over with pt and ride. All questions answered. Pt meets DC criteria. PIV DC'd. Pt transported to vehicle via WC in stable condition.

## 2020-08-05 NOTE — ANESTHESIA POSTPROCEDURE EVALUATION
Patient: Martin Briceño    Procedure Summary     Date: 08/05/20 Room / Location:  ENDOSCOPIC ULTRASOUND ROOM / SURGERY [facility]    Anesthesia Start: 0919 Anesthesia Stop:     Procedures:       GASTROSCOPY - WITH GASTRIC AND DUODENAL BIOPSIES      EGD, WITH ENDOSCOPIC US - UPPER CURVILINEAR      EGD, WITH ASPIRATION BIOPSY - PANCREAS Diagnosis:       Mass of pancreas      (MASS OF PANCREAS)    Surgeon: Diego Youssef M.D. Responsible Provider: Jeff Alexander M.D.    Anesthesia Type: general ASA Status: 3          Final Anesthesia Type: general  Last vitals  BP   Blood Pressure : 125/86, NIBP: 131/89    Temp   36.4 °C (97.5 °F)    Pulse   Pulse: (!) 56   Resp   16    SpO2   98 %      Anesthesia Post Evaluation    Patient location during evaluation: PACU  Patient participation: complete - patient participated  Level of consciousness: awake and alert  Pain score: 0    Airway patency: patent  Anesthetic complications: no  Cardiovascular status: hemodynamically stable  Respiratory status: acceptable  Hydration status: euvolemic    PONV: none           Nurse Pain Score: 0 (NPRS)

## 2020-08-05 NOTE — PROGRESS NOTES
Patient seen and examined before proceeding with EGD biopsies, EUS possible FNB of pancreas.  Risks, benefits, and alternatives of aforementioned procedures were discussed with patient.  Patient was given opportunities to ask questions and discuss other options.  Risks including but not limited to pancreatitis, perforation, infection, bleeding, missed lesion(s), possible need for surgery(ies) and/or interventional radiology, possible need for repeat procedure(s) and/or additional testing, hospitalization possibly prolonged, cardiac and/or pulmonary event, aspiration, hypoxia, stroke, medication and/or anesthesia reaction, indefinite diagnosis, discomfort, unsuccessful and/or incomplete procedure, ineffective therapy and/or persistent symptoms, damage to adjacent organs and/or vascular structures, and other adverse events possibly life-threatening.  Interactive discussion was undertaken with Layman's terms. I answered questions in full and to satisfaction.  Consenting person stated understanding and acceptance of these risks, and wished to proceed.  Informed consent was given in clear state of mind.

## 2020-08-05 NOTE — DISCHARGE INSTRUCTIONS
ENDOSCOPY HOME CARE INSTRUCTIONS    GASTROSCOPY OR ERCP  1. Don't eat or drink anything for about an hour after the test. You can then resume your regular diet.  2. Don't drive or drink alcohol for 24 hours. The medication you received will make you too drowsy.  3. Don't take any coffee, tea, or aspirin products until after you see your doctor. These can harm the lining of your stomach.  4. If you begin to vomit bloody material, or develop black or bloody stools, call your doctor as soon as possible.  5. If you have any neck, chest, abdominal pain or temp of 100 degrees, call your doctor.  6. Additional instructions: No alcohol or sleeping pills today. Avoid aspirin, NSAIDs (e.g. Ibuprofen, naprosyn, etc.) and fish oil for 5days. Do not restart aggrenox until 8/10 Monday. [facility] office will call for surgeon Dr. Gann referral for resection. Results to convey to patient:  T2 N0 Mx pancreas tail cancer      You should call 911 if you develop problems with breathing or chest pain.  If any questions arise, call your doctor. If your doctor is not available, please feel free to call [phone]. You can also call the HEALTH HOTLINE open 24 hours/day, 7 days/week and speak to a nurse at [phone], or toll free [phone].    Depression / Suicide Risk    As you are discharged from this [facility] facility, it is important to learn how to keep safe from harming yourself.    Recognize the warning signs:  · Abrupt changes in personality, positive or negative- including increase in energy   · Giving away possessions  · Change in eating patterns- significant weight changes-  positive or negative  · Change in sleeping patterns- unable to sleep or sleeping all the time   · Unwillingness or inability to communicate  · Depression  · Unusual sadness, discouragement and loneliness  · Talk of wanting to die  · Neglect of personal appearance   · Rebelliousness- reckless behavior  · Withdrawal from  people/activities they love  · Confusion- inability to concentrate     If you or a loved one observes any of these behaviors or has concerns about self-harm, here's what you can do:  · Talk about it- your feelings and reasons for harming yourself  · Remove any means that you might use to hurt yourself (examples: pills, rope, extension cords, firearm)  · Get professional help from the community (Mental Health, Substance Abuse, psychological counseling)  · Do not be alone:Call your Safe Contact- someone whom you trust who will be there for you.  · Call your local CRISIS HOTLINE 675-6239 or 155-537-4937  · Call your local Children's Mobile Crisis Response Team [place] (989) 269-4952 or www.salgomed  · Call the toll free National Suicide Prevention Hotlines   · National Suicide Prevention Lifeline 943-592-TYVH (4860)  · National Hope Line Network 800-SUICIDE (178-7756)    I acknowledge receipt and understanding of these Home Care Instructions.

## 2020-08-05 NOTE — ANESTHESIA QCDR
2019 [facility] Clinical Data Registry (for Quality Improvement)     Postoperative nausea/vomiting risk protocol (Adult = 18 yrs and Pediatric 3-17 yrs)- (430 and 463)  General inhalation anesthetic (NOT TIVA) with PONV risk factors: No  Provision of anti-emetic therapy with at least 2 different classes of agents: N/A  Patient DID NOT receive anti-emetic therapy and reason is documented in Medical Record: N/A    Multimodal Pain Management- (477)  Non-emergent surgery AND patient age >= 18: Yes  Use of Multimodal Pain Management, two or more drugs and/or interventions, NOT including systemic opioids: No  Exception: Documented allergy to multiple classes of analgesics: No       Smoking Abstinence (404)  Patient is current smoker (cigarette, pipe, e-cig, marijuanna): No  Elective Surgery:   Abstinence instructions provided prior to day of surgery:   Patient abstained from smoking on day of surgery:     Pre-Op Beta-Blocker in Isolated CABG (44)  Isolated CABG AND patient age >= 18: No  Beta-blocker admin within 24 hours of surgical incision:   Exception:of medical reason(s) for not administering beta blocker within 24 hours prior to surgical incision (e.g., not  indicated,other medical reason):     PACU assessment of acute postoperative pain prior to Anesthesia Care End- Applies to Patients Age = 18- (ABG7)  Initial PACU pain score is which of the following: < 7/10  Patient unable to report pain score: N/A    Post-anesthetic transfer of care checklist/protocol to PACU/ICU- (426 and 427)  Upon conclusion of case, patient transferred to which of the following locations: PACU/Non-ICU  Use of transfer checklist/protocol: Yes  Exclusion: Service Performed in Patient Hospital Room (and thus did not require transfer): N/A  Unplanned admission to ICU related to anesthesia service up through end of PACU care- (MD51)  Unplanned admission to ICU (not initially anticipated at anesthesia start time): No

## 2020-08-05 NOTE — PROCEDURES
Pre-procedure Diagnoses   Abnormal abdominal CT scan [R93.5]   Gastroesophageal reflux disease, esophagitis presence not specified [K21.9]   Epigastric pain [R10.13]   Dyspepsia [R10.13]   Post-procedure Diagnoses   Cancer of pancreas, tail (HCC) [C25.2]   Procedures   UPPER ENDOSCOPIC ULTRASOUND GUIDED FINE-NEEDLE ASPIRATION BIOPSIES OF PANCREAS TAIL [93986 (CPT®)]   ESOPHAGOGASTRODUODENOSCOPY OR UPPER GI ENDOSCOPY WITH BIOPSIES [78981 (CPT®)]     Endoscopist: Diego Youssef MD, [facility], [facility]    Monitored anesthesia care: Dr. Jeff Alexander    Consent: Risks, benefits, and alternatives of aforementioned procedures were discussed with patient in detail before proceeding.  Patient was given opportunities to ask questions and discuss other options.  Risks including but not limited to pancreatitis, malignant seeding, perforation, infection, bleeding, missed lesion(s), possible need for surgery(ies) and/or interventional radiology, possible need for repeat procedure(s) and/or additional testing, hospitalization possibly prolonged, cardiac and/or pulmonary event, aspiration, hypoxia, stroke, medication and/or anesthesia reaction, indefinite diagnosis, discomfort, unsuccessful and/or incomplete procedure, ineffective therapy and/or persistent symptoms, damage to adjacent organs and/or vascular structures, and other adverse events possibly life-threatening.  Interactive discussion was undertaken with Layman's terms.  I answered questions in full and to satisfaction.  Consenting person(s) stated understanding and acceptance of these risks, and wished to proceed.  Informed consent was given in clear state of mind.    Endoscopic procedures in detail:  Diagnostic endoscope was inserted from mouth into second portion of the duodenum, retroflexion was performed in the stomach.  Gastric biopsies obtained.  I suctioned insufflated air and stomach fluid contents upon removal.      Curvilinear echoendoscope was inserted from mouth to  second portion of the duodenum.  Pancreas was examined with identification of normal vascular landmarks including superior mesenteric artery, superior mesenteric vein, portal vein, celiac artery, portal vein confluence, and splenorenal junction.  Biliary duct was imaged and traced from intrapancreatic portion to hepatic hilum.  Celiac axis was imaged to look for echogenic lymph nodes.  Fine-needle aspiration biopsies were obtained via a trans-gastric approach from tail mass with 22-gauge SharkCore after Doppler studies had identified a vessel-free path for all needle biopsies.  A total of three (3) passes were obtained, multiple cytology slides were created and core or sampling remnants were sent to pathology.    Procedure times:  - In-room 09:18  - Start 09:25  - Completed 09:39  - Out of room per nursing records    Esophagogastroduodenoscopy Findings:  - Esophagus: endoscopically unremarkable.   - Stomach: non-specific thickened gastric fold, biopsied.  - Duodenum: endoscopically unremarkable to 2nd portion    Endoscopic Ultrasound Findings:  - Pancreas: 24x16.0mm pancreas tail solid mass without vascular invasion, non-dilated duct.  - Bile duct: non-dilated, no stricture  - Celiac axis: no lymph nodes.  - Fine-Needle aspiration: tail lesion FNB'd x3.     Impression:  1. T2 N0 Mx pancreas tail cancer   2. Gastric biopsies to confirm eradication of H.pylori status.    Recommendations:   1.  Routine post-endoscopy anesthesia recovery care.  Discharge patient when he is awake, alert and comfortable, when recovery criteria are met.  Aspiration and fall precautions x 24 hours.   2.  Pancreas surgeon referral to see Dr. Gann for distal pancreatectomy.  3.  I conveyed findings to Lauren Lorenzana cancer .  4.  I spoke to patient, family and recovery nurse about impression, diagnosis and recommendations.  I answered questions in full and to satisfaction

## 2020-08-06 ENCOUNTER — PATIENT MESSAGE (OUTPATIENT)
Dept: MEDICAL GROUP | Facility: MEDICAL CENTER | Age: 75
End: 2020-08-06

## 2020-08-06 ENCOUNTER — TELEPHONE (OUTPATIENT)
Dept: VASCULAR LAB | Facility: MEDICAL CENTER | Age: 75
End: 2020-08-06

## 2020-08-06 DIAGNOSIS — I67.1 CEREBROVASCULAR MALFORMATION, DURAL AV FISTULA: ICD-10-CM

## 2020-08-06 PROBLEM — C25.2 MALIGNANT NEOPLASM OF TAIL OF PANCREAS (HCC): Status: ACTIVE | Noted: 2020-08-06

## 2020-08-07 ENCOUNTER — TELEPHONE (OUTPATIENT)
Dept: VASCULAR LAB | Facility: MEDICAL CENTER | Age: 75
End: 2020-08-07

## 2020-08-07 NOTE — TELEPHONE ENCOUNTER
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  PCP to arrange for referral to neurosurgery    Michael Bloch, MD  Vascular Medicine

## 2020-08-07 NOTE — TELEPHONE ENCOUNTER
Per Dr. Bloch - patient would best be served by referral to neurosurgery rather than wasting an appointment to come in and see us / Spoke to patient- Appointment cancelled

## 2020-08-10 ENCOUNTER — OFFICE VISIT (OUTPATIENT)
Dept: HEMATOLOGY ONCOLOGY | Facility: MEDICAL CENTER | Age: 75
End: 2020-08-10
Payer: MEDICARE

## 2020-08-10 ENCOUNTER — OFFICE VISIT (OUTPATIENT)
Dept: SURGICAL ONCOLOGY | Facility: MEDICAL CENTER | Age: 75
End: 2020-08-10
Payer: MEDICARE

## 2020-08-10 VITALS
WEIGHT: 158.18 LBS | HEIGHT: 70 IN | DIASTOLIC BLOOD PRESSURE: 62 MMHG | OXYGEN SATURATION: 96 % | SYSTOLIC BLOOD PRESSURE: 118 MMHG | HEART RATE: 72 BPM | RESPIRATION RATE: 16 BRPM | BODY MASS INDEX: 22.65 KG/M2 | TEMPERATURE: 98.3 F

## 2020-08-10 VITALS
DIASTOLIC BLOOD PRESSURE: 62 MMHG | TEMPERATURE: 98.4 F | HEIGHT: 71 IN | WEIGHT: 156.53 LBS | BODY MASS INDEX: 21.91 KG/M2 | OXYGEN SATURATION: 98 % | SYSTOLIC BLOOD PRESSURE: 118 MMHG | HEART RATE: 72 BPM

## 2020-08-10 DIAGNOSIS — M35.00 SJOGREN'S SYNDROME, WITH UNSPECIFIED ORGAN INVOLVEMENT (HCC): ICD-10-CM

## 2020-08-10 DIAGNOSIS — R53.82 CHRONIC FATIGUE: ICD-10-CM

## 2020-08-10 DIAGNOSIS — C25.2 MALIGNANT NEOPLASM OF TAIL OF PANCREAS (HCC): ICD-10-CM

## 2020-08-10 DIAGNOSIS — R93.5 ABNORMAL CT OF THE ABDOMEN: ICD-10-CM

## 2020-08-10 DIAGNOSIS — R10.9 ABDOMINAL PAIN, UNSPECIFIED ABDOMINAL LOCATION: ICD-10-CM

## 2020-08-10 PROCEDURE — 99213 OFFICE O/P EST LOW 20 MIN: CPT | Performed by: NURSE PRACTITIONER

## 2020-08-10 PROCEDURE — 99205 OFFICE O/P NEW HI 60 MIN: CPT | Performed by: SURGERY

## 2020-08-10 RX ORDER — LOSARTAN POTASSIUM 50 MG/1
50 TABLET ORAL DAILY
COMMUNITY
End: 2020-08-26

## 2020-08-10 ASSESSMENT — ENCOUNTER SYMPTOMS
BACK PAIN: 1
NAUSEA: 0
CONSTIPATION: 1
WEIGHT LOSS: 1
DIARRHEA: 1
ABDOMINAL PAIN: 1
ABDOMINAL PAIN: 1
WEIGHT LOSS: 1
VOMITING: 0

## 2020-08-10 ASSESSMENT — FIBROSIS 4 INDEX
FIB4 SCORE: 3.17
FIB4 SCORE: 3.17

## 2020-08-10 NOTE — PROGRESS NOTES
Subjective:      Tex Briceño is a 75 y.o. male who presents for EUS biopsy Results.      HPI    Patient seen today in follow-up for EUS biopsy results.  He presents unaccompanied for today's visit.  However patient with family and friends present via phone.    Patient complaint abdominal discomfort dating back to his initial diagnosis with Sjogren's syndrome in 2016.  This is continued and therefore he was sent for CT in February 2020 for abdominal pain.  Incidental work-up did find a 9 mm nonspecific low-density lesion on the pancreatic tail.  He did have a follow-up MRI completed on July 23, 2020 which did note a hypoenhancing irregular mass in the pancreatic tail measuring 3 x 1.8 cm in size.  He was sent to Dr. Youssef to perform an EUS biopsy which he did complete on August 5, 2020.    Pathology does come back positive for adenocarcinoma.  Patient was aware of the diagnosis and has been referred to Dr. Gann, surgeon which he is seen today.    Patient does state that he has had an 18 pound weight loss in the last year.  He stated with his diagnosis of Sjogren's and his abdominal pain he did change his diet and quit drinking alcohol.  He does complain of increased bowel movements, anywhere from 2-6 bowel movements in the morning before 10 AM.  He does not note diarrhea episodes after eating.    Allergies   Allergen Reactions   • Mirtazapine Unspecified     Psychological issues   • Tape Itching     Current Outpatient Medications on File Prior to Visit   Medication Sig Dispense Refill   • losartan (COZAAR) 50 MG Tab Take 50 mg by mouth every day.     • CEQUA 0.09 % Solution Place 1 Drop in both eyes 2 Times a Day.     • atorvastatin (LIPITOR) 10 MG Tab Take 10 mg by mouth every day. For 30 mg total     • temazepam (RESTORIL) 15 MG Cap Take 15 mg by mouth at bedtime as needed for Sleep.     • Turmeric 500 MG Cap Take  by mouth 2 Times a Day.     • hydroxychloroquine (PLAQUENIL) 200 MG Tab TAKE 1 TABLET  BY MOUTH TWICE A  Tab 1   • zolpidem (AMBIEN) 10 MG Tab 5 mg.     • calcium carbonate (TUMS) 500 MG Chew Tab Take 500 mg by mouth every day.     • Non Formulary Request 2 Times a Day. HYDOEYE FOR DRY EYES      • azelastine (ASTELIN) 137 MCG/SPRAY nasal spray Maurice 1 Spray in nose 2 times a day. (Patient taking differently: Spray 1 Spray in nose every evening.) 30 mL 2   • albuterol 108 (90 Base) MCG/ACT Aero Soln inhalation aerosol Inhale 2 Puffs by mouth every four hours as needed for Shortness of Breath. 1 Inhaler 2   • atorvastatin (LIPITOR) 20 MG Tab Take 1 Tab by mouth every day. 90 Tab 3   • levothyroxine (SYNTHROID) 125 MCG Tab Take 1 Tab by mouth Every morning on an empty stomach. 90 Tab 3   • liothyronine (CYTOMEL) 5 MCG Tab Take 1 Tab by mouth every day. 90 Tab 3   • aspirin-dipyridamole (AGGRENOX)  MG CAPSULE SR 12 HR Take 1 Cap by mouth 2 times a day. 180 Cap 3   • omeprazole (PRILOSEC) 40 MG delayed-release capsule Take 1 Cap by mouth every day. (Patient taking differently: Take 40 mg by mouth 2 times a day.) 90 Cap 3   • Ascorbic Acid (VITAMIN C) 1000 MG Tab Take 1,000 mg by mouth.     • Lifitegrast (XIIDRA) 5 % Solution 2 Times a Day.     • fluticasone (FLONASE) 50 MCG/ACT nasal spray Spray 1 Spray in nose 2 times a day.     • tadalafil (CIALIS) 10 MG tablet Take 10 mg by mouth as needed for Erectile Dysfunction.     • Bepotastine Besilate (BEPREVE) 1.5 % Solution by Ophthalmic route. Seasonal eye drops for dry eyes     • Cyanocobalamin (B-12) 1000 MCG Cap Take  by mouth.     • cholecalciferol (D-3-5) 5000 UNIT Cap Take 6,000 Units by mouth every day.     • Coenzyme Q10 (COQ10) 400 MG Cap Take  by mouth 2 Times a Day.     • cycloSPORINE, PF, (CEQUA) 0.09 % Solution by Ophthalmic route.       No current facility-administered medications on file prior to visit.        Review of Systems   Constitutional: Positive for weight loss.   Gastrointestinal: Positive for abdominal pain, constipation  "and diarrhea. Negative for nausea and vomiting.          Objective:     /62 (BP Location: Right arm, Patient Position: Sitting, BP Cuff Size: Adult)   Pulse 72   Temp 36.8 °C (98.3 °F) (Temporal)   Resp 16   Ht 1.778 m (5' 10\")   Wt 71.7 kg (158 lb 2.9 oz)   SpO2 96%   BMI 22.70 kg/m²      Physical Exam  Vitals signs reviewed.   Constitutional:       Appearance: Normal appearance.   HENT:      Head: Normocephalic and atraumatic.   Cardiovascular:      Rate and Rhythm: Normal rate and regular rhythm.   Pulmonary:      Effort: Pulmonary effort is normal. No respiratory distress.      Breath sounds: Normal breath sounds. No wheezing.   Abdominal:      General: Bowel sounds are normal. There is no distension.      Palpations: Abdomen is soft. There is no mass.      Tenderness: There is abdominal tenderness (mild mid upper abdomen).   Neurological:      Mental Status: He is alert and oriented to person, place, and time.   Psychiatric:         Mood and Affect: Mood normal.         Behavior: Behavior normal.           FINAL DIAGNOSIS:     A. Gastric tissue:          Mild chronic gastritis with a few dilated pits noted.          No H. pylori organisms identified on War[place]-Starry special           stain.   B. Slides- pancreas tail mass:          Positive for malignancy.   C. Core- pancreas tail mass:          Positive for adenocarcinoma in a background of fibrous tissue.       Mr-abdomen-with & W/o    Result Date: 7/23/2020 7/23/2020 10:15 AM HISTORY/REASON FOR EXAM:  Abdominal pain after eating. Subcentimeter pancreatic tail lesion. Indeterminate right renal lesion.. TECHNIQUE/EXAM DESCRIPTION: MRI of the abdomen with dynamic IV gadolinium enhancement. MR imaging of the abdomen was performed.  MR images of the abdomen were obtained with coronal and axial single-shot fast spin-echo T2, fat-suppressed axial FRFSE T2, axial in-phase and out-of-phase FSPGR T1, axial DWI with a b-value of 600, 3D MRCP,  " precontrast fat-suppressed FSPGR T1, dynamic gadolinium enhanced axial fat-suppressed T1 FSPGR in the arterial dominant, portal venous, 2-minute and 4-minute delayed phases, with delayed coronal fat-suppressed T1 FSPGR sequence. . The study was performed on a Siemens Skyra 3.0 [employer] MRI scanner. 15 mL ProHance contrast was administered intravenously. COMPARISON: CT to 3/20/2020 FINDINGS: Liver: Homogeneous enhancement. There are multiple hepatic cysts. The largest measures approximately 3.7 cm. The remainder of the cysts are less than a centimeter. Vasculature: The hepatic, portal, superior mesenteric, and splenic veins are patent. Gallbladder and biliary system: No intrahepatic or extrahepatic ductal dilatation. The gallbladder is normal in appearance. No choledocholithiasis. Pancreas: A hypoenhancing irregular mass is poorly defined pancreatic tail, but measures approximately 3 x 1.8 cm. There is mildly increased T2 signal and diffusion restriction. There is a tiny pancreatic body cyst. No pancreatic ductal dilatation. Ductal anatomy is conventional. Spleen: Unremarkable. Kidneys: 1.1 cm cystic mass within the mid to lower left kidney with mildly increased T2 signal, consistent with a complex cyst. No internal enhancement is identified. The kidneys enhance symmetrically. Adrenal glands: Unremarkable. Ascites: None. Lymph nodes: No adenopathy. Miscellaneous: There are hypertrophic gastric folds, similar to the prior CT.     1.  Hypoenhancing irregular mass in the pancreatic tail may have enlarged from the prior CT. Findings are nonspecific. Possible etiologies include a neuroendocrine tumor or other neoplasm. Focal chronic pancreatitis could have a similar appearance. 2.  Nonspecific thickened gastric folds are seen as on the prior CT, possibly related to patient's history of Sjogren's syndrome. 3.  Complex cyst in the mid to lower left kidney, Bosniak 2. No additional follow-up is recommended.      Mr-brain-w/o    Result Date: 7/30/2020 7/30/2020 8:59 AM HISTORY/REASON FOR EXAM:  headaches. TECHNIQUE/EXAM DESCRIPTION: MRI of the brain without contrast. T1 sagittal, T2 fast spin-echo axial, T1 coronal, FLAIR coronal, diffusion-weighted and apparent diffusion coefficient (ADC map) axial images were obtained of the whole brain. The study was performed on a Siemens Skyra 3.0 [employer] MRI scanner. COMPARISON:  None. FINDINGS: There is no acute infarct. There is no acute or chronic parenchymal hemorrhage. A few nonspecific T2 hyperintensities in the subcortical and periventricular white matter. There is no intra-axial space-occupying lesion. Mild cerebral volume loss  is seen. The ventricles, cortical sulci and the basal cisterns are prominent consistent with mild cerebral volume loss. There is no extra-axial fluid collection, hemorrhage or mass. The visualized flow voids of the cerebral vasculature are unremarkable. There are  fetal origins of bilateral posterior cerebral arteries.  There is no large lesion identified in the expected course of the intracranial portions of the cranial nerves. The skull bones are unremarkable. The paranasal sinuses are clear. The extracranial soft tissue including orbits appear grossly normal.     1.  No acute abnormality. 2.  Mild cerebral atrophy. 3.  Mild chronic microvascular ischemic disease. 4.  There are prominent blood vessels in the clivus and parapharyngeal spaces bilaterally. These findings are suspicious for possible dural AV fistula. Please correlate with history of tinnitus. Catheter angiogram is recommended for further evaluation.       Assessment/Plan:        1. Malignant neoplasm of tail of pancreas (HCC)  REFERRAL TO ONCOLOGY NURSE NAVIGATOR    REFERRAL TO HEMATOLOGY ONCOLOGY Referral to? [facility]     Plan  1.  Patient with newly diagnosed adenocarcinoma of the pancreas via EUS biopsy due to a 3 x 1.8 cm mass on the pancreatic tail.  Patient's  biopsy was completed by Dr. Youssef who referred the patient to surgeon, Dr. Gann.  Patient is scheduled to be seen by surgeon later this morning and he is already scheduled for surgery this coming Thursday, August 13.  Did answer questions to the best of my ability with the patient today.  Discussed the importance of following up with medical oncology, which I did refer to cancer care specialists.  Patient did verbalize understanding of the plan and is in agreement with the referral.    She came prepared with multiple questions.  Did encourage him to continue to write down questions as they come and have been prepared when he meets with the surgeon as well as the medical oncologist in the future.    Also went ahead and referred patient to our nurse navigator as well.    There is no further follow-up needed with [facility].

## 2020-08-10 NOTE — PROGRESS NOTES
Subjective:   8/10/2020  8:22 AM  Primary care physician:Vasu Zamudio M.D.  Referring Provider: Diego Owen MD  Medical Oncologist: NAVEED Helm    Chief Complaint:   Chief Complaint   Patient presents with   • New Patient     NP PANCREATIC CA REF DR OWEN      Diagnosis:   1. 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 Abdominal pain, unspecified abdominal location     3. Abnormal CT of the abdomen     4. Chronic fatigue         History of presenting illness:  Martin Briceño  is a pleasant 75 y.o. year old male who presented with what appears to be adenocarcinoma the tail the pancreas.  The patient states that he started having left upper quadrant pain with a 40 pound weight loss over several months.  He did undergo a CT scan back in February 3, 2020 which I have personally reviewed.  There is a small hypodense lesion in the tail the pancreas adjacent to the spleen measuring just slightly under a centimeter.  It was unclear what was the source of this.  They recommend a repeat MRI.  The patient then had an MRI of the abdomen on July 23, 2020 which revealed growth of the tumor measuring now approximately 2-1/2 x 1/2 cm.  This led to an endoscopic ultrasound by Dr. Owen on August 5, 2020 which revealed adenocarcinoma in the course of the stable tumor.  On the MRI from July 23, 2020 there is no sign of metastatic disease in the liver or in the celiac trunk.  There is no enlarged adenopathy.  There is no sign of peritoneal disease.  There is no vascular invasion on the endoscopic ultrasound which I personally reviewed as well.  The patient has no family history of pancreatic cancer.  He is extremely healthy individual outside of 40 pound weight loss over the last 6 months.  Patient does complain of postprandial pain in the epigastrium and left upper quadrant.  It is difficult to assess whether this is related to the tumor or gallbladder.  There is no duct dilation thus it leans more toward some  gallbladder dysfunction.  In any event, the patient is extremely healthy, active, does not smoke or drink alcohol.  He is on generic Plavix for a stroke for the past but he has no dysfunction or disability.  I have asked him to stop taking the drug starting to Monday.  He is scheduled for surgery on Thursday.  The patient has not seen anyone for medical oncology yet.  He was seen by the [facility] oncology program who made the referral to cancer care specialist.  He is here with his family on the phone to discuss neck steps.  He presently denies being jaundiced.  He is not a heavy drinker and has no signs of cirrhosis or portal hypertension.  His labs are essentially normal.      Past Medical History:   Diagnosis Date   • Adverse effect of anesthesia     Versed is not effective   • Anemia    • Cataract     IOL OU   • Chronic pain     lower back   • GERD (gastroesophageal reflux disease)    • Heart burn    • High cholesterol    • Hypertension    • Indigestion    • Pancreatic mass    • Post-nasal drip    • Sjogren's syndrome (HCC)    • Stroke (HCC) 2007    3 tia's per pt   • Unspecified disorder of thyroid     3/4 of the thyroid removed     Past Surgical History:   Procedure Laterality Date   • PB ENDOSCOPIC US EXAM, ESOPH  8/5/2020    Procedure: EGD, WITH ENDOSCOPIC US - UPPER CURVILINEAR;  Surgeon: Diego Youssef M.D.;  Location: SURGERY [facility];  Service: Gastroenterology   • GASTROSCOPY-ENDO  8/5/2020    Procedure: GASTROSCOPY - WITH GASTRIC AND DUODENAL BIOPSIES;  Surgeon: Diego Youssef M.D.;  Location: [facility];  Service: Gastroenterology   • EGD WITH ASP/BX  8/5/2020    Procedure: EGD, WITH ASPIRATION BIOPSY - PANCREAS;  Surgeon: Diego Youssef M.D.;  Location: SURGERY [facility];  Service: Gastroenterology   • PB SHLDR ARTHROSCOP,SURG,W/ROTAT CUFF REPB Left 6/1/2020    Procedure: ARTHROSCOPY, SHOULDER, WITH ROTATOR CUFF REPAIR; EXTENSIVE DEBRIDEMENT; SUBSCAPULARIS REPAIR;   Surgeon: Newton Perea M.D.;  Location: SURGERY [facility];  Service: Orthopedics   • PB SHLDR ARTHROSCOP,PART ACROMIOPLAS Left 6/1/2020    Procedure: DECOMPRESSION, SHOULDER, ARTHROSCOPIC - SUBACROMIAL;  Surgeon: Newton Perea M.D.;  Location: SURGERY [facility];  Service: Orthopedics   • INGUINAL HERNIA LAPAROSCOPIC Left 2016   • SHOULDER ARTHROSCOPY Right 2015    RCR   • GASTROSCOPY WITH BIOPSY  6/11/08    Performed by JAYNE RIZO at [facility]   • EGD W/ENDOSCOPIC ULTRASOUND  6/11/08    Performed by JAYNE RIZO at [facility]   • THYROIDECTOMY  2003    Partial   • APPENDECTOMY  1976   • HERNIA REPAIR     • OTHER ABDOMINAL SURGERY      appendectomy 1976     Allergies   Allergen Reactions   • Mirtazapine Unspecified     Keeps him up    • Tape Itching     Outpatient Encounter Medications as of 8/10/2020   Medication Sig Dispense Refill   • losartan (COZAAR) 50 MG Tab Take 50 mg by mouth every day.     • CEQUA 0.09 % Solution Place 1 Drop in both eyes 2 Times a Day.     • atorvastatin (LIPITOR) 10 MG Tab Take 10 mg by mouth every day. For 30 mg total     • temazepam (RESTORIL) 15 MG Cap Take 15 mg by mouth at bedtime as needed for Sleep.     • Turmeric 500 MG Cap Take  by mouth.     • hydroxychloroquine (PLAQUENIL) 200 MG Tab TAKE 1 TABLET BY MOUTH TWICE A  Tab 1   • zolpidem (AMBIEN) 10 MG Tab 5 mg.     • calcium carbonate (TUMS) 500 MG Chew Tab Take 500 mg by mouth every day.     • Non Formulary Request HYDOEYE FOR DRY EYES     • azelastine (ASTELIN) 137 MCG/SPRAY nasal spray [place] 1 Spray in nose 2 times a day. (Patient taking differently: Spray 1 Spray in nose every evening.) 30 mL 2   • albuterol 108 (90 Base) MCG/ACT Aero Soln inhalation aerosol Inhale 2 Puffs by mouth every four hours as needed for Shortness of Breath. 1 Inhaler 2   • atorvastatin (LIPITOR) 20 MG Tab Take 1 Tab by mouth every day. 90 Tab 3   • levothyroxine (SYNTHROID)  125 MCG Tab Take 1 Tab by mouth Every morning on an empty stomach. 90 Tab 3   • liothyronine (CYTOMEL) 5 MCG Tab Take 1 Tab by mouth every day. 90 Tab 3   • aspirin-dipyridamole (AGGRENOX)  MG CAPSULE SR 12 HR Take 1 Cap by mouth 2 times a day. 180 Cap 3   • omeprazole (PRILOSEC) 40 MG delayed-release capsule Take 1 Cap by mouth every day. (Patient taking differently: Take 40 mg by mouth 2 times a day.) 90 Cap 3   • cycloSPORINE, PF, (CEQUA) 0.09 % Solution by Ophthalmic route.     • Ascorbic Acid (VITAMIN C) 1000 MG Tab Take 1,000 mg by mouth.     • Lifitegrast (XIIDRA) 5 % Solution      • fluticasone (FLONASE) 50 MCG/ACT nasal spray Spray 1 Spray in nose 2 times a day.     • tadalafil (CIALIS) 10 MG tablet Take 10 mg by mouth as needed for Erectile Dysfunction.     • Bepotastine Besilate (BEPREVE) 1.5 % Solution by Ophthalmic route. Seasonal eye drops for dry eyes     • Cyanocobalamin (B-12) 1000 MCG Cap Take  by mouth.     • cholecalciferol (D-3-5) 5000 UNIT Cap Take 6,000 Units by mouth every day.     • Coenzyme Q10 (COQ10) 400 MG Cap Take  by mouth.       No facility-administered encounter medications on file as of 8/10/2020.      Social History     Socioeconomic History   • Marital status:      Spouse name: Not on file   • Number of children: Not on file   • Years of education: Not on file   • Highest education level: Not on file   Occupational History   • Not on file   Social Needs   • Financial resource strain: Not on file   • Food insecurity     Worry: Not on file     Inability: Not on file   • Transportation needs     Medical: Not on file     Non-medical: Not on file   Tobacco Use   • Smoking status: Former Smoker     Packs/day: 1.00     Years: 30.00     Pack years: 30.00     Types: Cigarettes     Start date:      Quit date:      Years since quittin.6   • Smokeless tobacco: Never Used   Substance and Sexual Activity   • Alcohol use: Not Currently     Frequency: Never   • Drug use:  "Yes     Types: Marijuana, Inhaled     Comment: marijuanna for sleep   • Sexual activity: Not Currently   Lifestyle   • Physical activity     Days per week: Not on file     Minutes per session: Not on file   • Stress: Not on file   Relationships   • Social connections     Talks on phone: Not on file     Gets together: Not on file     Attends [employer] service: Not on file     Active member of club or organization: Not on file     Attends meetings of clubs or organizations: Not on file     Relationship status: Not on file   • Intimate partner violence     Fear of current or ex partner: Not on file     Emotionally abused: Not on file     Physically abused: Not on file     Forced sexual activity: Not on file   Other Topics Concern   • Not on file   Social History Narrative   • Not on file      Social History     Tobacco Use   Smoking Status Former Smoker   • Packs/day: 1.00   • Years: 30.00   • Pack years: 30.00   • Types: Cigarettes   • Start date:    • Quit date:    • Years since quittin.6   Smokeless Tobacco Never Used     Social History     Substance and Sexual Activity   Alcohol Use Not Currently   • Frequency: Never     Social History     Substance and Sexual Activity   Drug Use Yes   • Types: Marijuana, Inhaled    Comment: marijuanna for sleep        Family History   Problem Relation Age of Onset   • COPD Mother    • Heart Disease Father         A. Fib       Review of Systems   Constitutional: Positive for malaise/fatigue and weight loss.   Gastrointestinal: Positive for abdominal pain.   Genitourinary: Positive for frequency.   Musculoskeletal: Positive for back pain.   All other systems reviewed and are negative.       Objective:   /62   Pulse 72   Temp 36.9 °C (98.4 °F) (Temporal)   Ht 1.791 m (5' 10.5\")   Wt 71 kg (156 lb 8.4 oz)   SpO2 98%   BMI 22.14 kg/m²     Physical Exam   Constitutional: He is oriented to person, place, and time. He appears well-developed and well-nourished. "   HENT:   Head: Normocephalic and atraumatic.   Eyes: Pupils are equal, round, and reactive to light. Conjunctivae are normal.   Neck: Normal range of motion. Neck supple.   Cardiovascular: Normal rate and regular rhythm.   Pulmonary/Chest: Effort normal and breath sounds normal.   Abdominal: Soft. Bowel sounds are normal. There is abdominal tenderness.   Left upper quadrant pain   Musculoskeletal: Normal range of motion.   Neurological: He is alert and oriented to person, place, and time.   Skin: Skin is warm and dry.   Psychiatric: He has a normal mood and affect. His behavior is normal. Judgment and thought content normal.   Nursing note and vitals reviewed.      Labs:  Results for VIDYA MALCOLM (MRN [id number]) as of 8/10/2020 08:30   Ref. Range 7/28/2020 16:44   WBC Latest Ref Range: 4.8 - 10.8 K/uL 5.5   RBC Latest Ref Range: 4.70 - 6.10 M/uL 3.52 (L)   Hemoglobin Latest Ref Range: 14.0 - 18.0 g/dL 12.2 (L)   Hematocrit Latest Ref Range: 42.0 - 52.0 % 34.8 (L)   MCV Latest Ref Range: 81.4 - 97.8 fL 98.9 (H)   MCH Latest Ref Range: 27.0 - 33.0 pg 34.7 (H)   MCHC Latest Ref Range: 33.7 - 35.3 g/dL 35.1   RDW Latest Ref Range: 35.9 - 50.0 fL 42.6   Platelet Count Latest Ref Range: 164 - 446 K/uL 115 (L)   MPV Latest Ref Range: 9.0 - 12.9 fL 7.8 (L)   Neutrophils-Polys Latest Ref Range: 44.00 - 72.00 % 56.60   Neutrophils (Absolute) Latest Ref Range: 1.82 - 7.42 K/uL 3.10   Lymphocytes Latest Ref Range: 22.00 - 41.00 % 27.90   Lymphs (Absolute) Latest Ref Range: 1.00 - 4.80 K/uL 1.53   Monocytes Latest Ref Range: 0.00 - 13.40 % 12.80   Monos (Absolute) Latest Ref Range: 0.00 - 0.85 K/uL 0.70   Eosinophils Latest Ref Range: 0.00 - 6.90 % 1.60   Eos (Absolute) Latest Ref Range: 0.00 - 0.51 K/uL 0.09   Basophils Latest Ref Range: 0.00 - 1.80 % 0.70   Baso (Absolute) Latest Ref Range: 0.00 - 0.12 K/uL 0.04   Immature Granulocytes Latest Ref Range: 0.00 - 0.90 % 0.40   Immature Granulocytes (abs) Latest Ref Range:  0.00 - 0.11 K/uL 0.02   Nucleated RBC Latest Units: /100 WBC 0.00   NRBC (Absolute) Latest Units: K/uL 0.00   Sodium Latest Ref Range: 135 - 145 mmol/L 138   Potassium Latest Ref Range: 3.6 - 5.5 mmol/L 3.9   Chloride Latest Ref Range: 96 - 112 mmol/L 101   Co2 Latest Ref Range: 20 - 33 mmol/L 25   Anion Gap Latest Ref Range: 7.0 - 16.0  12.0   Glucose Latest Ref Range: 65 - 99 mg/dL 98   Bun Latest Ref Range: 8 - 22 mg/dL 15   Creatinine Latest Ref Range: 0.50 - 1.40 mg/dL 0.93   GFR If  Latest Ref Range: >60 mL/min/1.73 m 2 >60   GFR If Non  Latest Ref Range: >60 mL/min/1.73 m 2 >60   Calcium Latest Ref Range: 8.4 - 10.2 mg/dL 8.8   Fasting Status Unknown Non-Fasting       Imaging: MRI 7/23/20  Per my read,     IMPRESSION:        1.  Hypoenhancing irregular mass in the pancreatic tail may have enlarged from the prior CT. Findings are nonspecific. Possible etiologies include a neuroendocrine tumor or other neoplasm. Focal chronic pancreatitis could have a similar appearance.     2.  Nonspecific thickened gastric folds are seen as on the prior CT, possibly related to patient's history of Sjogren's syndrome.     3.  Complex cyst in the mid to lower left kidney, Bosniak 2. No additional follow-up is recommended.            Pathology: 8/5/20    FINAL DIAGNOSIS:     A. Gastric tissue:          Mild chronic gastritis with a few dilated pits noted.          No H. pylori organisms identified on [place]-Starry special           stain.   B. Slides- pancreas tail mass:          Positive for malignancy.   C. Core- pancreas tail mass:          Positive for adenocarcinoma in a background of fibrous tissue.     Procedures: 8/5/20    Esophagogastroduodenoscopy/Endoscopic Ultrasound    Diagnosis:     1. 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 Abdominal pain, unspecified abdominal location     3. Abnormal CT of the abdomen     4. Chronic fatigue             Medical Decision Making:   Today's Assessment / Status / Plan:     In light of the present findings, my personal review the MRI shows no sign of metastatic disease to the liver or any enlarged nodes in the area of the celiac or portal region.  There also is no sign of peritoneal disease.  Based on this, and the location of the tumor, the patient is a great candidate for a open distal pancreatectomy, splenectomy, no dissection, omental flap, open cholecystectomy and other indicated procedures.  We discussed the risks and benefits of the procedure including bleeding, infection, 25% leak rate from the pancreatic tail, risk of DVT, PE, anesthetics, and a hospital stay between 2 and 4 days.  He also understands he needs to get the spleen vaccinations and I have provided him with a list to take to his primary care physician.  He will go past there today.  In the meantime, he has been scheduled for Thursday and he has scheduled COVID test tomorrow.    I, Dr. Gann have entered, reviewed and confirmed the above diagnoses related to this patient on this date of service, 8/10/2020  8:22 AM.    He agreed and verbalized his agreement and understanding with the current plan. I answered all questions and concerns he has at this time and advised him to call at any time in the interim with questions or concerns in regards to his care.    Thank you for allowing me to participate in his care, I will continue to follow closely.       Please note that this dictation was created using voice recognition software. I have made every reasonable attempt to correct obvious errors, but I expect that there are errors of grammar and possibly content that I did not discover before finalizing the note.     Thank you for this consultation and allowing me to participate in your patient's care. If I can be of further service please contact my office.

## 2020-08-11 DIAGNOSIS — Z01.812 PRE-OPERATIVE LABORATORY EXAMINATION: ICD-10-CM

## 2020-08-11 LAB
ABO GROUP BLD: NORMAL
ALBUMIN SERPL BCP-MCNC: 4.4 G/DL (ref 3.2–4.9)
ALBUMIN/GLOB SERPL: 1.8 G/DL
ALP SERPL-CCNC: 52 U/L (ref 30–99)
ALT SERPL-CCNC: 19 U/L (ref 2–50)
ANION GAP SERPL CALC-SCNC: 11 MMOL/L (ref 7–16)
AST SERPL-CCNC: 24 U/L (ref 12–45)
BILIRUB SERPL-MCNC: 0.5 MG/DL (ref 0.1–1.5)
BLD GP AB SCN SERPL QL: NORMAL
BUN SERPL-MCNC: 10 MG/DL (ref 8–22)
CALCIUM SERPL-MCNC: 9.1 MG/DL (ref 8.5–10.5)
CHLORIDE SERPL-SCNC: 100 MMOL/L (ref 96–112)
CO2 SERPL-SCNC: 27 MMOL/L (ref 20–33)
COVID ORDER STATUS COVID19: NORMAL
CREAT SERPL-MCNC: 0.9 MG/DL (ref 0.5–1.4)
GLOBULIN SER CALC-MCNC: 2.5 G/DL (ref 1.9–3.5)
GLUCOSE SERPL-MCNC: 95 MG/DL (ref 65–99)
INR PPP: 1.06 (ref 0.87–1.13)
POTASSIUM SERPL-SCNC: 4.1 MMOL/L (ref 3.6–5.5)
PROT SERPL-MCNC: 6.9 G/DL (ref 6–8.2)
PROTHROMBIN TIME: 14.1 SEC (ref 12–14.6)
RH BLD: NORMAL
SARS-COV-2 RNA RESP QL NAA+PROBE: NOTDETECTED
SODIUM SERPL-SCNC: 138 MMOL/L (ref 135–145)
SPECIMEN SOURCE: NORMAL

## 2020-08-11 PROCEDURE — 36415 COLL VENOUS BLD VENIPUNCTURE: CPT

## 2020-08-11 PROCEDURE — U0003 INFECTIOUS AGENT DETECTION BY NUCLEIC ACID (DNA OR RNA); SEVERE ACUTE RESPIRATORY SYNDROME CORONAVIRUS 2 (SARS-COV-2) (CORONAVIRUS DISEASE [COVID-19]), AMPLIFIED PROBE TECHNIQUE, MAKING USE OF HIGH THROUGHPUT TECHNOLOGIES AS DESCRIBED BY CMS-2020-01-R: HCPCS

## 2020-08-11 PROCEDURE — 86850 RBC ANTIBODY SCREEN: CPT

## 2020-08-11 PROCEDURE — 85610 PROTHROMBIN TIME: CPT

## 2020-08-11 PROCEDURE — 86901 BLOOD TYPING SEROLOGIC RH(D): CPT

## 2020-08-11 PROCEDURE — 80053 COMPREHEN METABOLIC PANEL: CPT

## 2020-08-11 PROCEDURE — 86900 BLOOD TYPING SEROLOGIC ABO: CPT

## 2020-08-11 ASSESSMENT — FIBROSIS 4 INDEX: FIB4 SCORE: 3.17

## 2020-08-12 NOTE — OR NURSING
COVID-19 Pre-surgery screenin. Do you have an undiagnosed respiratory illness or symptoms such as coughing or sneezing? No  a. Onset of Sx No  b. Acute vs. chronic respiratory illness No    2. Do you have an unexplained fever greater than 100.4 degrees Fahrenheit or 38 degrees Celsius?     No     3. Have you had direct exposure to a patient who tested positive for Covid-19?    No     4. Have you had any loss of your sense of taste or smell? Have you had N/V or sore throat? No    Patient has been informed of visitor policy and asked to wear a mask upon entering the hospital   Yes

## 2020-08-13 ENCOUNTER — ANESTHESIA EVENT (OUTPATIENT)
Dept: SURGERY | Facility: MEDICAL CENTER | Age: 75
DRG: 407 | End: 2020-08-13
Payer: MEDICARE

## 2020-08-13 ENCOUNTER — HOSPITAL ENCOUNTER (INPATIENT)
Facility: MEDICAL CENTER | Age: 75
LOS: 4 days | DRG: 407 | End: 2020-08-17
Attending: SURGERY | Admitting: SURGERY
Payer: MEDICARE

## 2020-08-13 ENCOUNTER — ANESTHESIA (OUTPATIENT)
Dept: SURGERY | Facility: MEDICAL CENTER | Age: 75
DRG: 407 | End: 2020-08-13
Payer: MEDICARE

## 2020-08-13 DIAGNOSIS — C25.2 MALIGNANT NEOPLASM OF TAIL OF PANCREAS (HCC): ICD-10-CM

## 2020-08-13 LAB
ABO + RH BLD: NORMAL
PATHOLOGY CONSULT NOTE: NORMAL

## 2020-08-13 PROCEDURE — 700111 HCHG RX REV CODE 636 W/ 250 OVERRIDE (IP): Performed by: ANESTHESIOLOGY

## 2020-08-13 PROCEDURE — 62320 NJX INTERLAMINAR CRV/THRC: CPT | Performed by: SURGERY

## 2020-08-13 PROCEDURE — 07TP0ZZ RESECTION OF SPLEEN, OPEN APPROACH: ICD-10-PCS | Performed by: SURGERY

## 2020-08-13 PROCEDURE — 48140 PARTIAL REMOVAL OF PANCREAS: CPT | Performed by: SURGERY

## 2020-08-13 PROCEDURE — 501443 HCHG STAPLER, ROTIC. FLEX: Performed by: SURGERY

## 2020-08-13 PROCEDURE — 76998 US GUIDE INTRAOP: CPT | Mod: 26 | Performed by: SURGERY

## 2020-08-13 PROCEDURE — 700105 HCHG RX REV CODE 258: Performed by: ANESTHESIOLOGY

## 2020-08-13 PROCEDURE — A9270 NON-COVERED ITEM OR SERVICE: HCPCS

## 2020-08-13 PROCEDURE — 502704 HCHG DEVICE, LIGASURE IMPACT: Performed by: SURGERY

## 2020-08-13 PROCEDURE — 700102 HCHG RX REV CODE 250 W/ 637 OVERRIDE(OP)

## 2020-08-13 PROCEDURE — 700102 HCHG RX REV CODE 250 W/ 637 OVERRIDE(OP): Performed by: SURGERY

## 2020-08-13 PROCEDURE — 88309 TISSUE EXAM BY PATHOLOGIST: CPT

## 2020-08-13 PROCEDURE — P9045 ALBUMIN (HUMAN), 5%, 250 ML: HCPCS | Mod: JG | Performed by: ANESTHESIOLOGY

## 2020-08-13 PROCEDURE — 160036 HCHG PACU - EA ADDL 30 MINS PHASE I: Performed by: SURGERY

## 2020-08-13 PROCEDURE — 700102 HCHG RX REV CODE 250 W/ 637 OVERRIDE(OP): Performed by: ANESTHESIOLOGY

## 2020-08-13 PROCEDURE — 160048 HCHG OR STATISTICAL LEVEL 1-5: Performed by: SURGERY

## 2020-08-13 PROCEDURE — 43605 BIOPSY OF STOMACH: CPT | Performed by: SURGERY

## 2020-08-13 PROCEDURE — 160035 HCHG PACU - 1ST 60 MINS PHASE I: Performed by: SURGERY

## 2020-08-13 PROCEDURE — 700111 HCHG RX REV CODE 636 W/ 250 OVERRIDE (IP)

## 2020-08-13 PROCEDURE — 501838 HCHG SUTURE GENERAL: Performed by: SURGERY

## 2020-08-13 PROCEDURE — 160002 HCHG RECOVERY MINUTES (STAT): Performed by: SURGERY

## 2020-08-13 PROCEDURE — A9270 NON-COVERED ITEM OR SERVICE: HCPCS | Performed by: ANESTHESIOLOGY

## 2020-08-13 PROCEDURE — 49905 OMENTAL FLAP INTRA-ABDOM: CPT | Performed by: SURGERY

## 2020-08-13 PROCEDURE — 700101 HCHG RX REV CODE 250: Performed by: ANESTHESIOLOGY

## 2020-08-13 PROCEDURE — 700105 HCHG RX REV CODE 258: Performed by: SURGERY

## 2020-08-13 PROCEDURE — 160009 HCHG ANES TIME/MIN: Performed by: SURGERY

## 2020-08-13 PROCEDURE — 500380 HCHG DRAIN, PENROSE 1/4X12: Performed by: SURGERY

## 2020-08-13 PROCEDURE — 700101 HCHG RX REV CODE 250: Performed by: SURGERY

## 2020-08-13 PROCEDURE — 160042 HCHG SURGERY MINUTES - EA ADDL 1 MIN LEVEL 5: Performed by: SURGERY

## 2020-08-13 PROCEDURE — 07BD0ZZ EXCISION OF AORTIC LYMPHATIC, OPEN APPROACH: ICD-10-PCS | Performed by: SURGERY

## 2020-08-13 PROCEDURE — 160031 HCHG SURGERY MINUTES - 1ST 30 MINS LEVEL 5: Performed by: SURGERY

## 2020-08-13 PROCEDURE — 502652 HCHG STAPLES, ETHICON ECR60: Performed by: SURGERY

## 2020-08-13 PROCEDURE — A9270 NON-COVERED ITEM OR SERVICE: HCPCS | Performed by: SURGERY

## 2020-08-13 PROCEDURE — 88305 TISSUE EXAM BY PATHOLOGIST: CPT | Mod: 59

## 2020-08-13 PROCEDURE — 0FBG0ZZ EXCISION OF PANCREAS, OPEN APPROACH: ICD-10-PCS | Performed by: SURGERY

## 2020-08-13 PROCEDURE — 501445 HCHG STAPLER, SKIN DISP: Performed by: SURGERY

## 2020-08-13 PROCEDURE — 0DB70ZX EXCISION OF STOMACH, PYLORUS, OPEN APPROACH, DIAGNOSTIC: ICD-10-PCS | Performed by: SURGERY

## 2020-08-13 PROCEDURE — 38747 REMOVE ABDOMINAL LYMPH NODES: CPT | Mod: 59 | Performed by: SURGERY

## 2020-08-13 PROCEDURE — 302129 PCA PLUS: Performed by: ANESTHESIOLOGY

## 2020-08-13 PROCEDURE — 700111 HCHG RX REV CODE 636 W/ 250 OVERRIDE (IP): Performed by: SURGERY

## 2020-08-13 PROCEDURE — 770001 HCHG ROOM/CARE - MED/SURG/GYN PRIV*

## 2020-08-13 RX ORDER — ZINC CITRATE/PHYTASE 25MG-500MG
1 CAPSULE ORAL
COMMUNITY
End: 2020-08-31

## 2020-08-13 RX ORDER — ONDANSETRON 2 MG/ML
4 INJECTION INTRAMUSCULAR; INTRAVENOUS EVERY 4 HOURS PRN
Status: DISCONTINUED | OUTPATIENT
Start: 2020-08-13 | End: 2020-08-13

## 2020-08-13 RX ORDER — TEMAZEPAM 15 MG/1
15 CAPSULE ORAL
Status: DISCONTINUED | OUTPATIENT
Start: 2020-08-13 | End: 2020-08-17 | Stop reason: HOSPADM

## 2020-08-13 RX ORDER — ALPRAZOLAM 0.25 MG/1
0.25 TABLET ORAL NIGHTLY PRN
Status: DISCONTINUED | OUTPATIENT
Start: 2020-08-13 | End: 2020-08-14

## 2020-08-13 RX ORDER — LIDOCAINE HYDROCHLORIDE AND EPINEPHRINE 15; 5 MG/ML; UG/ML
INJECTION, SOLUTION EPIDURAL
Status: COMPLETED | OUTPATIENT
Start: 2020-08-13 | End: 2020-08-13

## 2020-08-13 RX ORDER — ACETAMINOPHEN 325 MG/1
650 TABLET ORAL EVERY 6 HOURS PRN
Status: DISCONTINUED | OUTPATIENT
Start: 2020-08-13 | End: 2020-08-14

## 2020-08-13 RX ORDER — SCOLOPAMINE TRANSDERMAL SYSTEM 1 MG/1
1 PATCH, EXTENDED RELEASE TRANSDERMAL
Status: DISCONTINUED | OUTPATIENT
Start: 2020-08-13 | End: 2020-08-14

## 2020-08-13 RX ORDER — MORPHINE SULFATE 2 MG/ML
INJECTION, SOLUTION INTRAMUSCULAR; INTRAVENOUS PRN
Status: DISCONTINUED | OUTPATIENT
Start: 2020-08-13 | End: 2020-08-13 | Stop reason: SURG

## 2020-08-13 RX ORDER — LIDOCAINE HYDROCHLORIDE 20 MG/ML
INJECTION, SOLUTION EPIDURAL; INFILTRATION; INTRACAUDAL; PERINEURAL PRN
Status: DISCONTINUED | OUTPATIENT
Start: 2020-08-13 | End: 2020-08-13 | Stop reason: SURG

## 2020-08-13 RX ORDER — PROMETHAZINE HYDROCHLORIDE 25 MG/1
12.5-25 TABLET ORAL EVERY 6 HOURS PRN
Status: DISCONTINUED | OUTPATIENT
Start: 2020-08-13 | End: 2020-08-17 | Stop reason: HOSPADM

## 2020-08-13 RX ORDER — SCOLOPAMINE TRANSDERMAL SYSTEM 1 MG/1
1 PATCH, EXTENDED RELEASE TRANSDERMAL
Status: DISCONTINUED | OUTPATIENT
Start: 2020-08-13 | End: 2020-08-13

## 2020-08-13 RX ORDER — SODIUM CHLORIDE, SODIUM LACTATE, POTASSIUM CHLORIDE, CALCIUM CHLORIDE 600; 310; 30; 20 MG/100ML; MG/100ML; MG/100ML; MG/100ML
INJECTION, SOLUTION INTRAVENOUS CONTINUOUS
Status: DISCONTINUED | OUTPATIENT
Start: 2020-08-13 | End: 2020-08-16 | Stop reason: DRUGHIGH

## 2020-08-13 RX ORDER — HALOPERIDOL 5 MG/ML
1 INJECTION INTRAMUSCULAR EVERY 6 HOURS PRN
Status: DISCONTINUED | OUTPATIENT
Start: 2020-08-13 | End: 2020-08-13

## 2020-08-13 RX ORDER — CEFOTETAN DISODIUM 2 G/20ML
INJECTION, POWDER, FOR SOLUTION INTRAMUSCULAR; INTRAVENOUS PRN
Status: DISCONTINUED | OUTPATIENT
Start: 2020-08-13 | End: 2020-08-13 | Stop reason: SURG

## 2020-08-13 RX ORDER — PHENYLEPHRINE HYDROCHLORIDE 10 MG/ML
INJECTION, SOLUTION INTRAMUSCULAR; INTRAVENOUS; SUBCUTANEOUS PRN
Status: DISCONTINUED | OUTPATIENT
Start: 2020-08-13 | End: 2020-08-13 | Stop reason: SURG

## 2020-08-13 RX ORDER — HYDROXYCHLOROQUINE SULFATE 200 MG/1
200 TABLET, FILM COATED ORAL 2 TIMES DAILY WITH MEALS
Status: DISCONTINUED | OUTPATIENT
Start: 2020-08-13 | End: 2020-08-17 | Stop reason: HOSPADM

## 2020-08-13 RX ORDER — ONDANSETRON 2 MG/ML
4 INJECTION INTRAMUSCULAR; INTRAVENOUS
Status: DISCONTINUED | OUTPATIENT
Start: 2020-08-13 | End: 2020-08-13 | Stop reason: HOSPADM

## 2020-08-13 RX ORDER — DEXAMETHASONE SODIUM PHOSPHATE 4 MG/ML
4 INJECTION, SOLUTION INTRA-ARTICULAR; INTRALESIONAL; INTRAMUSCULAR; INTRAVENOUS; SOFT TISSUE
Status: DISCONTINUED | OUTPATIENT
Start: 2020-08-13 | End: 2020-08-13

## 2020-08-13 RX ORDER — OMEPRAZOLE 20 MG/1
20 CAPSULE, DELAYED RELEASE ORAL DAILY
Status: DISCONTINUED | OUTPATIENT
Start: 2020-08-13 | End: 2020-08-17 | Stop reason: HOSPADM

## 2020-08-13 RX ORDER — CEFAZOLIN SODIUM 2 G/100ML
2 INJECTION, SOLUTION INTRAVENOUS EVERY 8 HOURS
Status: COMPLETED | OUTPATIENT
Start: 2020-08-13 | End: 2020-08-14

## 2020-08-13 RX ORDER — DEXAMETHASONE SODIUM PHOSPHATE 4 MG/ML
4 INJECTION, SOLUTION INTRA-ARTICULAR; INTRALESIONAL; INTRAMUSCULAR; INTRAVENOUS; SOFT TISSUE
Status: DISCONTINUED | OUTPATIENT
Start: 2020-08-13 | End: 2020-08-17

## 2020-08-13 RX ORDER — DIPHENHYDRAMINE HYDROCHLORIDE 50 MG/ML
25 INJECTION INTRAMUSCULAR; INTRAVENOUS EVERY 6 HOURS PRN
Status: DISCONTINUED | OUTPATIENT
Start: 2020-08-13 | End: 2020-08-17

## 2020-08-13 RX ORDER — METOCLOPRAMIDE HYDROCHLORIDE 5 MG/ML
INJECTION INTRAMUSCULAR; INTRAVENOUS PRN
Status: DISCONTINUED | OUTPATIENT
Start: 2020-08-13 | End: 2020-08-13 | Stop reason: SURG

## 2020-08-13 RX ORDER — BUPIVACAINE HYDROCHLORIDE AND EPINEPHRINE 2.5; 5 MG/ML; UG/ML
INJECTION, SOLUTION EPIDURAL; INFILTRATION; INTRACAUDAL; PERINEURAL PRN
Status: DISCONTINUED | OUTPATIENT
Start: 2020-08-13 | End: 2020-08-13 | Stop reason: SURG

## 2020-08-13 RX ORDER — ONDANSETRON 2 MG/ML
INJECTION INTRAMUSCULAR; INTRAVENOUS PRN
Status: DISCONTINUED | OUTPATIENT
Start: 2020-08-13 | End: 2020-08-13 | Stop reason: SURG

## 2020-08-13 RX ORDER — ALPRAZOLAM 0.5 MG/1
0.5 TABLET ORAL ONCE
Status: COMPLETED | OUTPATIENT
Start: 2020-08-13 | End: 2020-08-13

## 2020-08-13 RX ORDER — ONDANSETRON 2 MG/ML
4 INJECTION INTRAMUSCULAR; INTRAVENOUS EVERY 4 HOURS PRN
Status: DISCONTINUED | OUTPATIENT
Start: 2020-08-13 | End: 2020-08-17 | Stop reason: HOSPADM

## 2020-08-13 RX ORDER — HALOPERIDOL 5 MG/ML
1 INJECTION INTRAMUSCULAR
Status: DISCONTINUED | OUTPATIENT
Start: 2020-08-13 | End: 2020-08-13 | Stop reason: HOSPADM

## 2020-08-13 RX ORDER — LIDOCAINE HYDROCHLORIDE 10 MG/ML
INJECTION, SOLUTION EPIDURAL; INFILTRATION; INTRACAUDAL; PERINEURAL
Status: COMPLETED
Start: 2020-08-13 | End: 2020-08-13

## 2020-08-13 RX ORDER — ALBUMIN, HUMAN INJ 5% 5 %
SOLUTION INTRAVENOUS PRN
Status: DISCONTINUED | OUTPATIENT
Start: 2020-08-13 | End: 2020-08-13 | Stop reason: SURG

## 2020-08-13 RX ORDER — SODIUM CHLORIDE, SODIUM LACTATE, POTASSIUM CHLORIDE, CALCIUM CHLORIDE 600; 310; 30; 20 MG/100ML; MG/100ML; MG/100ML; MG/100ML
INJECTION, SOLUTION INTRAVENOUS CONTINUOUS
Status: DISCONTINUED | OUTPATIENT
Start: 2020-08-13 | End: 2020-08-13

## 2020-08-13 RX ORDER — DIPHENHYDRAMINE HYDROCHLORIDE 50 MG/ML
25 INJECTION INTRAMUSCULAR; INTRAVENOUS EVERY 6 HOURS PRN
Status: DISCONTINUED | OUTPATIENT
Start: 2020-08-13 | End: 2020-08-13

## 2020-08-13 RX ORDER — HALOPERIDOL 5 MG/ML
1 INJECTION INTRAMUSCULAR EVERY 6 HOURS PRN
Status: DISCONTINUED | OUTPATIENT
Start: 2020-08-13 | End: 2020-08-17

## 2020-08-13 RX ADMIN — LIDOCAINE HYDROCHLORIDE 100 MG: 20 INJECTION, SOLUTION EPIDURAL; INFILTRATION; INTRACAUDAL at 10:44

## 2020-08-13 RX ADMIN — DIPHENHYDRAMINE HYDROCHLORIDE 25 MG: 50 INJECTION INTRAMUSCULAR; INTRAVENOUS at 17:08

## 2020-08-13 RX ADMIN — PROPOFOL 200 MG: 10 INJECTION, EMULSION INTRAVENOUS at 10:44

## 2020-08-13 RX ADMIN — BUPIVACAINE HYDROCHLORIDE AND EPINEPHRINE 10 ML: 2.5; 5 INJECTION, SOLUTION EPIDURAL; INFILTRATION; INTRACAUDAL; PERINEURAL at 11:54

## 2020-08-13 RX ADMIN — CEFAZOLIN SODIUM 2 G: 2 INJECTION, SOLUTION INTRAVENOUS at 22:16

## 2020-08-13 RX ADMIN — SUGAMMADEX 200 MG: 100 INJECTION, SOLUTION INTRAVENOUS at 11:58

## 2020-08-13 RX ADMIN — PHENYLEPHRINE HYDROCHLORIDE 100 MCG: 10 INJECTION INTRAVENOUS at 11:23

## 2020-08-13 RX ADMIN — Medication: at 22:17

## 2020-08-13 RX ADMIN — Medication 5 ML: at 08:53

## 2020-08-13 RX ADMIN — SODIUM CHLORIDE, POTASSIUM CHLORIDE, SODIUM LACTATE AND CALCIUM CHLORIDE: 600; 310; 30; 20 INJECTION, SOLUTION INTRAVENOUS at 17:17

## 2020-08-13 RX ADMIN — METOCLOPRAMIDE 10 MG: 5 INJECTION, SOLUTION INTRAMUSCULAR; INTRAVENOUS at 11:54

## 2020-08-13 RX ADMIN — MORPHINE SULFATE 5 MG: 2 INJECTION, SOLUTION INTRAMUSCULAR; INTRAVENOUS at 10:50

## 2020-08-13 RX ADMIN — CEFOTETAN DISODIUM 2 G: 2 INJECTION, POWDER, FOR SOLUTION INTRAMUSCULAR; INTRAVENOUS at 10:44

## 2020-08-13 RX ADMIN — FENTANYL CITRATE 50 MCG: 50 INJECTION INTRAMUSCULAR; INTRAVENOUS at 12:51

## 2020-08-13 RX ADMIN — SODIUM CHLORIDE, POTASSIUM CHLORIDE, SODIUM LACTATE AND CALCIUM CHLORIDE: 600; 310; 30; 20 INJECTION, SOLUTION INTRAVENOUS at 10:28

## 2020-08-13 RX ADMIN — ONDANSETRON 4 MG: 2 INJECTION INTRAMUSCULAR; INTRAVENOUS at 11:56

## 2020-08-13 RX ADMIN — ROCURONIUM BROMIDE 50 MG: 10 INJECTION, SOLUTION INTRAVENOUS at 10:44

## 2020-08-13 RX ADMIN — FENTANYL CITRATE 50 MCG: 50 INJECTION INTRAMUSCULAR; INTRAVENOUS at 12:15

## 2020-08-13 RX ADMIN — PHENYLEPHRINE HYDROCHLORIDE 100 MCG: 10 INJECTION INTRAVENOUS at 11:29

## 2020-08-13 RX ADMIN — ALBUMIN (HUMAN) 250 ML: 2.5 SOLUTION INTRAVENOUS at 11:04

## 2020-08-13 RX ADMIN — LIDOCAINE HYDROCHLORIDE,EPINEPHRINE BITARTRATE 3 ML: 15; .005 INJECTION, SOLUTION EPIDURAL; INFILTRATION; INTRACAUDAL; PERINEURAL at 10:30

## 2020-08-13 RX ADMIN — ALPRAZOLAM 0.5 MG: 0.5 TABLET ORAL at 22:15

## 2020-08-13 RX ADMIN — ALBUMIN (HUMAN) 250 ML: 2.5 SOLUTION INTRAVENOUS at 11:42

## 2020-08-13 RX ADMIN — HYDROMORPHONE HYDROCHLORIDE: 1 INJECTION, SOLUTION INTRAMUSCULAR; INTRAVENOUS; SUBCUTANEOUS at 12:50

## 2020-08-13 RX ADMIN — TEMAZEPAM 15 MG: 15 CAPSULE ORAL at 22:15

## 2020-08-13 RX ADMIN — POVIDONE-IODINE 15 ML: 10 SOLUTION TOPICAL at 08:53

## 2020-08-13 RX ADMIN — SODIUM CHLORIDE, POTASSIUM CHLORIDE, SODIUM LACTATE AND CALCIUM CHLORIDE: 600; 310; 30; 20 INJECTION, SOLUTION INTRAVENOUS at 11:25

## 2020-08-13 RX ADMIN — FENTANYL CITRATE 50 MCG: 50 INJECTION INTRAMUSCULAR; INTRAVENOUS at 12:33

## 2020-08-13 RX ADMIN — SODIUM CHLORIDE, POTASSIUM CHLORIDE, SODIUM LACTATE AND CALCIUM CHLORIDE: 600; 310; 30; 20 INJECTION, SOLUTION INTRAVENOUS at 08:53

## 2020-08-13 RX ADMIN — LIDOCAINE HYDROCHLORIDE 5 ML: 10 INJECTION, SOLUTION EPIDURAL; INFILTRATION; INTRACAUDAL at 08:53

## 2020-08-13 RX ADMIN — HYDROXYCHLOROQUINE SULFATE 200 MG: 200 TABLET, FILM COATED ORAL at 17:09

## 2020-08-13 ASSESSMENT — PATIENT HEALTH QUESTIONNAIRE - PHQ9
1. LITTLE INTEREST OR PLEASURE IN DOING THINGS: NOT AT ALL
2. FEELING DOWN, DEPRESSED, IRRITABLE, OR HOPELESS: NOT AT ALL
SUM OF ALL RESPONSES TO PHQ9 QUESTIONS 1 AND 2: 0

## 2020-08-13 ASSESSMENT — LIFESTYLE VARIABLES
TOTAL SCORE: 0
CONSUMPTION TOTAL: NEGATIVE
HAVE PEOPLE ANNOYED YOU BY CRITICIZING YOUR DRINKING: NO
EVER FELT BAD OR GUILTY ABOUT YOUR DRINKING: NO
HOW MANY TIMES IN THE PAST YEAR HAVE YOU HAD 5 OR MORE DRINKS IN A DAY: 0
HAVE YOU EVER FELT YOU SHOULD CUT DOWN ON YOUR DRINKING: NO
TOTAL SCORE: 0
ALCOHOL_USE: NO
EVER_SMOKED: YES
TOTAL SCORE: 0
EVER HAD A DRINK FIRST THING IN THE MORNING TO STEADY YOUR NERVES TO GET RID OF A HANGOVER: NO
ON A TYPICAL DAY WHEN YOU DRINK ALCOHOL HOW MANY DRINKS DO YOU HAVE: 0
AVERAGE NUMBER OF DAYS PER WEEK YOU HAVE A DRINK CONTAINING ALCOHOL: 0

## 2020-08-13 ASSESSMENT — COGNITIVE AND FUNCTIONAL STATUS - GENERAL
SUGGESTED CMS G CODE MODIFIER DAILY ACTIVITY: CH
MOBILITY SCORE: 21
STANDING UP FROM CHAIR USING ARMS: A LITTLE
DAILY ACTIVITIY SCORE: 24
SUGGESTED CMS G CODE MODIFIER MOBILITY: CJ
TURNING FROM BACK TO SIDE WHILE IN FLAT BAD: A LITTLE
CLIMB 3 TO 5 STEPS WITH RAILING: A LITTLE

## 2020-08-13 ASSESSMENT — FIBROSIS 4 INDEX: FIB4 SCORE: 3.5

## 2020-08-13 NOTE — OR NURSING
Awake, alert and sipping water.  Vitals stable. Epidural infusing.  Incentive spirometer teach done with return demonstration. On monitors with alarms audible.    Called brother Kenneth) with update.

## 2020-08-13 NOTE — ANESTHESIA PROCEDURE NOTES
Airway    Date/Time: 8/13/2020 10:44 AM  Performed by: Pablo Serna M.D.  Authorized by: Pablo Serna M.D.     Location:  OR  Urgency:  Elective  Indications for Airway Management:  Anesthesia      Spontaneous Ventilation: absent    Sedation Level:  Deep  Preoxygenated: Yes    Patient Position:  Sniffing  Final Airway Type:  Endotracheal airway  Final Endotracheal Airway:  ETT  Cuffed: Yes    Technique Used for Successful ETT Placement:  Direct laryngoscopy    Insertion Site:  Oral  Blade Type:  Carrion  Laryngoscope Blade/Videolaryngoscope Blade Size:  2  ETT Size (mm):  8.0  Measured from:  Teeth  ETT to Teeth (cm):  24  Placement Verified by: auscultation and capnometry    Cormack-Lehane Classification:  Grade I - full view of glottis  Number of Attempts at Approach:  1

## 2020-08-13 NOTE — ANESTHESIA PROCEDURE NOTES
Epidural Block    Date/Time: 8/13/2020 10:30 AM  Performed by: Pablo Serna M.D.  Authorized by: Pablo Serna M.D.     Start Time:  8/13/2020 10:30 AM  End Time:  8/13/2020 10:41 AM  Reason for Block: at surgeon's request and post-op pain management    patient identified, IV checked, site marked, risks and benefits discussed, surgical consent, monitors and equipment checked, pre-op evaluation and timeout performed    Prep: Betadine    Monitoring:  Blood pressure, cardiac monitor, continuous pulse oximetry and heart rate  Approach:  Midline  Location:  T7-T8  Injection Technique:  LETICIA air  Strength:  1%  Dose:  5ml  Needle Type:  Tuohy  Needle Gauge:  17 G  Needle Length:  3.5 in  Loss of resistance::  5  Catheter at Skin Depth:  15  Test Dose Result:  Negative

## 2020-08-13 NOTE — ANESTHESIA TIME REPORT
Anesthesia Start and Stop Event Times     Date Time Event    8/13/2020 1021 Ready for Procedure     1028 Anesthesia Start     1210 Anesthesia Stop        Responsible Staff  08/13/20    Name Role Begin End    Pablo Serna M.D. Anesth 1028 1210        Preop Diagnosis (Free Text):  Pre-op Diagnosis     PANCREATIC CANCER        Preop Diagnosis (Codes):    Post op Diagnosis  Pancreatic cancer (HCC)      Premium Reason  Non-Premium    Comments:

## 2020-08-13 NOTE — ANESTHESIA PREPROCEDURE EVALUATION
Relevant Problems   NEURO   (+) Chronic nonintractable headache      CARDIAC   (+) Essential hypertension      GI   (+) Gastroesophageal reflux disease without esophagitis      ENDO   (+) Hypothyroidism   pancreatic tumor      Physical Exam    Airway   Mallampati: II  TM distance: >3 FB  Neck ROM: full       Cardiovascular - normal exam  Rhythm: regular  Rate: normal  (-) murmur     Dental - normal exam           Pulmonary - normal exam  Breath sounds clear to auscultation     Abdominal    Neurological - normal exam                 Anesthesia Plan    ASA 3   ASA physical status 3 criteria: other (comment)    Plan - general and epidural   Neuraxial block will be post-op pain control    Airway plan will be ETT        Induction: intravenous      Pertinent diagnostic labs and testing reviewed    Informed Consent:    Anesthetic plan and risks discussed with patient.

## 2020-08-13 NOTE — OR NURSING
Resting comfortably on gurney, frequently using incentive spirometer.  Pain controlled with epidural anesthesia.

## 2020-08-13 NOTE — OP REPORT
DATE OF SERVICE:  08/13/2020    The patient is a 75-year-old male patient of Dr. Diego Youssef, who had been   referred to me after having been diagnosed with pancreatic cancer at the tail   of the pancreas.  After a long discussion with the patient and staging, the   patient was found to have no sign of metastatic disease, so he was scheduled   for a distal pancreatectomy and splenectomy.    SURGEON:  Dagoberto Gann MD    ASSISTANT SURGEON:  Emilio Rodriguez MD    ANESTHESIOLOGIST:  Pablo Serna MD    ANESTHESIA:  General and epidural for postoperative pain management.    ESTIMATED BLOOD LOSS:  Approximately 100 mL inclusive of blood in the spleen.    COMPLICATIONS:  No complications.    CASE:  This is a clean contaminated case is a case class.    SPECIMEN:  1.  Distal pancreatectomy, splenectomy with lymph nodes en bloc.  2. Full Thickness Gastric wedge biopsy open of the antrum due to extreme thickening of the   stomach wall.    PREOPERATIVE DIAGNOSIS:  Pancreatic cancer.    POSTOPERATIVE DIAGNOSIS:  Pancreatic cancer with abnormal gastric wall.    The patient was brought to OR, placed under general anesthetic, prepped with   chlorhexidine prep after having epidural placed for postoperative pain   management, a Hoang catheter was placed, preoperative antibiotics and sterile   drapes.  Time-out was done, which was adequate.  At that point, an incision   was made from the xiphoid down towards the umbilicus for approximately 6 to 7   cm.  Upon completion of that, we entered the abdomen, the falciform ligament   was mobilized off of the midline and the liver was retracted laterally.    Immediately, we noted the patient had extremely thickened distended stomach,   OG was placed and decompressed, but even after decompression, the stomach was   literally almost 2 to 3 inches full thickness both anterior and posterior   walls.  It also had a rugae-type surface, but no hardened masses anywhere and   no sign of  linitis plastica, but extremely abnormal.  We entered the lesser   sac using the LigaSure, ligated the short gastrics all the way up salvaging   the epiploic vein and artery with the stomach up to the left crura.  The   stomach was then retracted laterally.  The posterior attachment of the   pancreas were taken down with Bovie electrocautery.  We immediately noted we   could identify the tumor right at the tip of the pancreas near the spleen.  We   did identify the celiac trunk and peeled the lymph nodes off the celiac trunk   along the splenic artery and pulled them towards our specimen.  We identified   the splenic artery proximally near the celiac trunk, clamped it and tied it   with 2-0 silk suture, placed 2 large clips proximal and distal to the tie and   then stapled it closed.  That allowed for decompression.  At that point, we   then took down the splenic flexure of the colon using Bovie electrocautery and   the LigaSure retracting both the splenic flexure and transverse colon and the   duodenum away from the pancreas.  We entered and did an intraoperative   ultrasound of the pancreas and identified the tumor and went proximal to that   by about 3 to 4 cm, we entered the posterior plane, the avascular plane and   identified the splenic vein.  I then took the splenic vein and the pancreas in   one bite using an Endo NHI 60 white load.  Once that was completed, we did   intraoperative ultrasound of the liver.  There was no sign of metastatic   disease.    Again, as of note, at the beginning of the procedure, we did do a full   exploration of the abdomen, peritoneum, diaphragm, omentum, and small bowel   mesentery were all clean.  No sign of metastatic disease.  At that point, we   mobilized the avascular plane posteriorly working our way up towards the crura   on the left as well as the diaphragm.  We got around the spleen and en bloc   removed the lymph nodes from the celiac trunk and the splenic artery area  with   the specimen.  Specimen was marked and sent off to pathology for permanents.    We then irrigated with copious amounts of fluid.  We inspected the   transection margin.  The patient had no cracking of the pancreas and is a thin   pancreas.  At that point, we did not place a drain.  We then mobilized the   omentum off the transverse colon and the stomach using the LigaSure and placed   the omentum and secured it with interrupted 3-0 silk sutures near the   transection margin site.  Once that was completed, we then noted that the   stomach again was extremely abnormal and extremely thickened.  We did a   full-thickness biopsy of the stomach anteriorly along the antrum between the   vessels of the epiploic branches.  This went down and included a small piece   of mucosa.  We then closed the mucosa with a running 3-0 PDS followed by   interrupted 2-0 silk sutures of the serosa and the deep muscle of the stomach.    This was once completed in a 2-layer fashion.  We removed the OG tube,   placed the stomach back in its anatomical position showing no bleeding and/or   sign of ischemia.  Final sweep of the abdomen was negative.  All needle,   sponge and instrument counts were correct.  At that point, the operation was   completed.  We then closed with looped PDS starting in both directions tying   in the center, skin closed with staples after irrigating soft tissue with   saline and then an island drape.  He was extubated and transferred to   recovery.       ____________________________________     MD JESSENIA Jefferson / NTS    DD:  08/13/2020 12:29:33  DT:  08/13/2020 15:50:49    D#:  [id number]  Job#:  [id number]    cc: Emilio Rodriguez MD, TRICIA OWEN MD

## 2020-08-13 NOTE — ANESTHESIA POSTPROCEDURE EVALUATION
Patient: Martin Briceño    Procedure Summary     Date: 08/13/20 Room / Location: [place] [address] / SURGERY [facility]    Anesthesia Start: 1028 Anesthesia Stop: 1210    Procedures:       PANCREATECTOMY-DISTAL, OMENTAL FLAP, INTRA OPERATIVE ULTRA SOUND, GASTRIC WEDGE RESECTION (Abdomen)      SPLENECTOMY (Abdomen) Diagnosis: (PANCREATIC CANCER)    Surgeon: Dagoberto Gann M.D. Responsible Provider: Pablo Serna M.D.    Anesthesia Type: general, epidural ASA Status: 3          Final Anesthesia Type: general, epidural  Last vitals  BP   Blood Pressure : 127/70    Temp   36.1 °C (97 °F)    Pulse   Pulse: 63   Resp   16    SpO2   96 %      Anesthesia Post Evaluation    Patient location during evaluation: PACU  Patient participation: complete - patient participated  Level of consciousness: awake and alert    Airway patency: patent  Anesthetic complications: no  Cardiovascular status: hemodynamically stable  Respiratory status: acceptable  Hydration status: euvolemic    PONV: none           Nurse Pain Score: 8 (NPRS)

## 2020-08-14 LAB
ALBUMIN SERPL BCP-MCNC: 4.3 G/DL (ref 3.2–4.9)
ALBUMIN/GLOB SERPL: 2 G/DL
ALP SERPL-CCNC: 48 U/L (ref 30–99)
ALT SERPL-CCNC: 20 U/L (ref 2–50)
ANION GAP SERPL CALC-SCNC: 11 MMOL/L (ref 7–16)
AST SERPL-CCNC: 36 U/L (ref 12–45)
BILIRUB SERPL-MCNC: 0.7 MG/DL (ref 0.1–1.5)
BUN SERPL-MCNC: 9 MG/DL (ref 8–22)
CALCIUM SERPL-MCNC: 8.6 MG/DL (ref 8.5–10.5)
CHLORIDE SERPL-SCNC: 94 MMOL/L (ref 96–112)
CO2 SERPL-SCNC: 28 MMOL/L (ref 20–33)
CREAT SERPL-MCNC: 0.84 MG/DL (ref 0.5–1.4)
ERYTHROCYTE [DISTWIDTH] IN BLOOD BY AUTOMATED COUNT: 45.9 FL (ref 35.9–50)
GLOBULIN SER CALC-MCNC: 2.2 G/DL (ref 1.9–3.5)
GLUCOSE SERPL-MCNC: 106 MG/DL (ref 65–99)
HCT VFR BLD AUTO: 35.7 % (ref 42–52)
HGB BLD-MCNC: 12.5 G/DL (ref 14–18)
MCH RBC QN AUTO: 35.9 PG (ref 27–33)
MCHC RBC AUTO-ENTMCNC: 35 G/DL (ref 33.7–35.3)
MCV RBC AUTO: 102.6 FL (ref 81.4–97.8)
PLATELET # BLD AUTO: 108 K/UL (ref 164–446)
PMV BLD AUTO: 9.1 FL (ref 9–12.9)
POTASSIUM SERPL-SCNC: 3.8 MMOL/L (ref 3.6–5.5)
PROT SERPL-MCNC: 6.5 G/DL (ref 6–8.2)
RBC # BLD AUTO: 3.48 M/UL (ref 4.7–6.1)
SODIUM SERPL-SCNC: 133 MMOL/L (ref 135–145)
WBC # BLD AUTO: 10.2 K/UL (ref 4.8–10.8)

## 2020-08-14 PROCEDURE — 700111 HCHG RX REV CODE 636 W/ 250 OVERRIDE (IP): Performed by: SURGERY

## 2020-08-14 PROCEDURE — A9270 NON-COVERED ITEM OR SERVICE: HCPCS | Performed by: SURGERY

## 2020-08-14 PROCEDURE — 85027 COMPLETE CBC AUTOMATED: CPT

## 2020-08-14 PROCEDURE — 80053 COMPREHEN METABOLIC PANEL: CPT

## 2020-08-14 PROCEDURE — 97161 PT EVAL LOW COMPLEX 20 MIN: CPT

## 2020-08-14 PROCEDURE — 36415 COLL VENOUS BLD VENIPUNCTURE: CPT

## 2020-08-14 PROCEDURE — 700105 HCHG RX REV CODE 258: Performed by: SURGERY

## 2020-08-14 PROCEDURE — 770001 HCHG ROOM/CARE - MED/SURG/GYN PRIV*

## 2020-08-14 PROCEDURE — 700102 HCHG RX REV CODE 250 W/ 637 OVERRIDE(OP): Performed by: SURGERY

## 2020-08-14 RX ORDER — ACETAMINOPHEN 500 MG
1000 TABLET ORAL EVERY 6 HOURS PRN
Status: DISCONTINUED | OUTPATIENT
Start: 2020-08-14 | End: 2020-08-17 | Stop reason: HOSPADM

## 2020-08-14 RX ORDER — OXYCODONE HYDROCHLORIDE 5 MG/1
5 TABLET ORAL EVERY 4 HOURS PRN
Status: DISCONTINUED | OUTPATIENT
Start: 2020-08-14 | End: 2020-08-14

## 2020-08-14 RX ORDER — KETOROLAC TROMETHAMINE 10 MG/1
10 TABLET, FILM COATED ORAL EVERY 6 HOURS PRN
Status: DISCONTINUED | OUTPATIENT
Start: 2020-08-14 | End: 2020-08-17

## 2020-08-14 RX ADMIN — TEMAZEPAM 15 MG: 15 CAPSULE ORAL at 20:48

## 2020-08-14 RX ADMIN — Medication: at 10:17

## 2020-08-14 RX ADMIN — SODIUM CHLORIDE, POTASSIUM CHLORIDE, SODIUM LACTATE AND CALCIUM CHLORIDE: 600; 310; 30; 20 INJECTION, SOLUTION INTRAVENOUS at 06:16

## 2020-08-14 RX ADMIN — HYDROXYCHLOROQUINE SULFATE 200 MG: 200 TABLET, FILM COATED ORAL at 08:40

## 2020-08-14 RX ADMIN — CEFAZOLIN SODIUM 2 G: 2 INJECTION, SOLUTION INTRAVENOUS at 06:17

## 2020-08-14 RX ADMIN — SODIUM CHLORIDE, POTASSIUM CHLORIDE, SODIUM LACTATE AND CALCIUM CHLORIDE: 600; 310; 30; 20 INJECTION, SOLUTION INTRAVENOUS at 20:49

## 2020-08-14 RX ADMIN — OMEPRAZOLE 20 MG: 20 CAPSULE, DELAYED RELEASE ORAL at 06:17

## 2020-08-14 RX ADMIN — HYDROXYCHLOROQUINE SULFATE 200 MG: 200 TABLET, FILM COATED ORAL at 18:01

## 2020-08-14 RX ADMIN — ENOXAPARIN SODIUM 40 MG: 40 INJECTION SUBCUTANEOUS at 20:49

## 2020-08-14 ASSESSMENT — GAIT ASSESSMENTS
GAIT LEVEL OF ASSIST: SUPERVISED
DEVIATION: DECREASED BASE OF SUPPORT;BRADYKINETIC;SHUFFLED GAIT;OTHER (COMMENT)
DISTANCE (FEET): 15
ASSISTIVE DEVICE: FRONT WHEEL WALKER

## 2020-08-14 ASSESSMENT — COGNITIVE AND FUNCTIONAL STATUS - GENERAL
TURNING FROM BACK TO SIDE WHILE IN FLAT BAD: A LOT
MOBILITY SCORE: 15
CLIMB 3 TO 5 STEPS WITH RAILING: A LITTLE
MOVING FROM LYING ON BACK TO SITTING ON SIDE OF FLAT BED: A LOT
MOVING TO AND FROM BED TO CHAIR: A LOT
WALKING IN HOSPITAL ROOM: A LITTLE
SUGGESTED CMS G CODE MODIFIER MOBILITY: CK
STANDING UP FROM CHAIR USING ARMS: A LITTLE

## 2020-08-14 ASSESSMENT — ENCOUNTER SYMPTOMS: ABDOMINAL PAIN: 1

## 2020-08-14 NOTE — PROGRESS NOTES
2 RN skin check  Epidural site CDI. Scant bloody drainage noted at insertion site, dried  MLI w/ island dressing CDI.   Hoang catheter in place skin in tact around   All bony prominences in tact and blanching

## 2020-08-14 NOTE — CARE PLAN
Problem: Safety  Goal: Will remain free from falls  Outcome: PROGRESSING AS EXPECTED   Educate patient on level of fall risk and utilize both bed and chair alarms.    Problem: Knowledge Deficit  Goal: Knowledge of the prescribed therapeutic regimen will improve  Outcome: PROGRESSING AS EXPECTED   Educate patient on plan of care and encourage pt to ask questions.

## 2020-08-14 NOTE — PROGRESS NOTES
Noted that pt broke epidural connector and no longer has a functional epidural. Pt is restless and is not demonstrating care with lines. Pt  noted to pull at lines even while staff is present and despite education.   Dr Pablo Serna was updated on loss of epidural and pt behavior. New orders were received.

## 2020-08-14 NOTE — DIETARY
"Nutrition services: Day 1 of admit.  Martin Briceño is a 75 y.o. male with admitting DX of pancreatic cancer, malignant neoplasm of tail of pancreas.   Consult received per nutrition admit screen; recent weight loss (MST score = 2).     Visited pt at bedside, pt with moderate muscle loss with slight depression to temporal lobes and slight protrusion of clavicle bones. Pt states that he is very groggy d/t pain medication, however was able to provide diet/wt hx. Pt states that his weight loss largely started in March of this year with an acute illness. However pt also states that 6-8 weeks ago he cut dairy and gluten out of his diet due to digestive concerns, believes that this has contributed largely to his weight loss. Pt states that he had tried Boost / Ensure in the past, but had poor tolerance shortly after consuming, he suspects the dairy was not tolerated. Pt states that he has been optimizing protein intake with a plant based diet recently. We discussed high protein plant based foods, encouraged intake to replete nutritional status.     Assessment:  Height: 180.3 cm (5' 11\")  Weight: 71 kg (156 lb 8.4 oz)  Body mass index is 21.83 kg/m²., BMI classification: WNL  Diet/Intake: Regular; no PO intake recorded at this time    Evaluation:   1. Admitted for distal pancreatectomy and splenectomy - done yesterday  2. Obtained recent weight hx from chart review:   · 3/21 = 77.7 kg (171 lb)  · Current weight = 71 kg (156.5 lb)  · 6.7 kg (9%) weight loss x 5 months - significant weight loss   3. MAR: prilosec, dilaudid PCA, LR @ 75 ml/hr  4. Labs: Na 133    Malnutrition Risk: Pt with moderate malnutrition in context of chronic disease (pancreatic mass), evidenced by significant weight loss of 9% in 5 months and mild muscle loss (slight depression to temporal lobe, slight protrusion of clavicles).     Recommendations/Plan:  1. Declined Boost d/t hx of poor tolerance believed to be r/t dairy protein content "   2. Encourage intake of meals  3. Document intake of all meals as % taken in ADL's to provide interdisciplinary communication across all shifts.   4. Monitor weight.  5. Nutrition rep will continue to see patient for ongoing meal and snack preferences.  6. RD to monitor PO intake, wt trends, and nutrition labs/meds    RD to follow

## 2020-08-14 NOTE — PROGRESS NOTES
"Pt A&O x4. Calm, pleasant    Vitals /69   Pulse 87   Temp 37.3 °C (99.2 °F) (Temporal)   Resp 18   Ht 1.803 m (5' 11\")   Wt 71 kg (156 lb 8.4 oz)   SpO2 95%   BMI 21.83 kg/m²       Pain 9/10. Pt broke epidural last night. Wire still in place. Will address w/ MD about removal. New PCA running Dilaudid. Pt hesitant to use frequently, states it makes them drowsy    Neuro Denies new  numbness/tingling.     Cardiac Denies chest pain. Regular heart sounds    Vascular pulses +2 BUE, BLE. No edema noted    Respiratory lungs clear to auscultation. On RA. Sating 90%<. Denies SOB. Reinforced education on IS. Best 2000    GI passing flatus, last BM PTA* . Tolerating clear liquid diet. Denies N/V     pt voiding adequately. Hoang catheter in place    MSK Pt ambulating SBA. Bed alarm on. Pt calling appropriately    Skin MLI w/ island dressing CDI. Epidural site CDI scant bloody drainage around insertion    Fall precautions in place. Treaded socks on. Bed locked in lowest position. All belongings at bedside within reach. Call light within reach. Reinforced use of call light to patient.  All needs met at this time and POC discussed.    "

## 2020-08-14 NOTE — PROGRESS NOTES
Surgical Progress Note    Author: Dagoberto Gann M.D. Date & Time created: 2020   9:04 AM     Interval Events:  POD s/p distal panc and spleen  Epidural fell out  On PCA but too high  Feels drowsy  No NV  Slight itch    Review of Systems   Constitutional: Positive for malaise/fatigue.   Gastrointestinal: Positive for abdominal pain.   All other systems reviewed and are negative.    Hemodynamics:  Temp (24hrs), Av.6 °C (97.9 °F), Min:36.1 °C (97 °F), Max:37.3 °C (99.2 °F)  Temperature: 37 °C (98.6 °F)  Pulse  Av.3  Min: 51  Max: 98   Blood Pressure : 102/70     Respiratory:    Respiration: 17, Pulse Oximetry: 93 %           Neuro:  GCS       Fluids:    Intake/Output Summary (Last 24 hours) at 2020 0904  Last data filed at 2020 0835  Gross per 24 hour   Intake 2740 ml   Output 2600 ml   Net 140 ml        Current Diet Order   Procedures   • Diet Order Regular     Physical Exam  Constitutional:       Appearance: Normal appearance.      Comments: A bit lethargic   Cardiovascular:      Pulses: Normal pulses.      Heart sounds: Normal heart sounds.   Pulmonary:      Effort: Pulmonary effort is normal.      Breath sounds: Normal breath sounds.      Comments: poor IS  Abdominal:      General: Abdomen is flat.      Palpations: Abdomen is soft.      Tenderness: There is abdominal tenderness.   Neurological:      General: No focal deficit present.      Mental Status: He is alert and oriented to person, place, and time. Mental status is at baseline.       Labs:  Recent Results (from the past 24 hour(s))   Histology Request    Collection Time: 20 12:24 PM   Result Value Ref Range    Pathology Request Sent to Histo    CBC without Differential    Collection Time: 20  4:50 AM   Result Value Ref Range    WBC 10.2 4.8 - 10.8 K/uL    RBC 3.48 (L) 4.70 - 6.10 M/uL    Hemoglobin 12.5 (L) 14.0 - 18.0 g/dL    Hematocrit 35.7 (L) 42.0 - 52.0 %    .6 (H) 81.4 - 97.8 fL    MCH 35.9 (H)  27.0 - 33.0 pg    MCHC 35.0 33.7 - 35.3 g/dL    RDW 45.9 35.9 - 50.0 fL    Platelet Count 108 (L) 164 - 446 K/uL    MPV 9.1 9.0 - 12.9 fL   Comp Metabolic Panel (CMP)    Collection Time: 08/14/20  4:52 AM   Result Value Ref Range    Sodium 133 (L) 135 - 145 mmol/L    Potassium 3.8 3.6 - 5.5 mmol/L    Chloride 94 (L) 96 - 112 mmol/L    Co2 28 20 - 33 mmol/L    Anion Gap 11.0 7.0 - 16.0    Glucose 106 (H) 65 - 99 mg/dL    Bun 9 8 - 22 mg/dL    Creatinine 0.84 0.50 - 1.40 mg/dL    Calcium 8.6 8.5 - 10.5 mg/dL    AST(SGOT) 36 12 - 45 U/L    ALT(SGPT) 20 2 - 50 U/L    Alkaline Phosphatase 48 30 - 99 U/L    Total Bilirubin 0.7 0.1 - 1.5 mg/dL    Albumin 4.3 3.2 - 4.9 g/dL    Total Protein 6.5 6.0 - 8.2 g/dL    Globulin 2.2 1.9 - 3.5 g/dL    A-G Ratio 2.0 g/dL   ESTIMATED GFR    Collection Time: 08/14/20  4:52 AM   Result Value Ref Range    GFR If African American >60 >60 mL/min/1.73 m 2    GFR If Non African American >60 >60 mL/min/1.73 m 2     Medical Decision Making, by Problem:  There are no active hospital problems to display for this patient.    Plan:  Decrease PCA  Add toradol and tylenol    Quality Measures:  Quality-Core Measures    Discussed patient condition with Family and RN

## 2020-08-14 NOTE — THERAPY
"Physical Therapy   Initial Evaluation     Patient Name: Martin Briceño  Age:  75 y.o., Sex:  male  Medical Record #: [id number]  Today's Date: 8/14/2020     Precautions: Fall Risk    Assessment  Patient is 75 y.o. male admitted on 8/13/20 s/p distal pancreatectomy and splenectomy POD#1. Hx of pancreatic CA. Pt presents today very limited in his functional mobility due to abdominal pain. PT will follow during acute stay. Anticipate that pt will be appropriate for discharge to home with  PT services and intermittent family assist once pain is better managed.         Plan    Recommend Physical Therapy 4 times per week until therapy goals are met for the following treatments:  Bed Mobility, Equipment, Gait Training, Neuro Re-Education / Balance, Therapeutic Activities and Therapeutic Exercises    DC Equipment Recommendations: Unable to determine at this time(Pt unsure if he has a FWW or 4WW at home.)  Discharge Recommendations: Recommend home health for continued physical therapy services.       Subjective    Pt agreeable to PT. \"I know I need to get out of this bed.\"     Objective       08/14/20 1200   Prior Living Situation   Prior Services Home-Independent   Housing / Facility 1 Story House   Steps Into Home 0   Steps In Home 0   Equipment Owned Front-Wheel Walker   Lives with - Patient's Self Care Capacity Alone and Able to Care For Self   Comments Pt reports friends and family can stay overnight with him at discharge.   Prior Level of Functional Mobility   Bed Mobility Independent   Transfer Status Independent   Ambulation Independent   Assistive Devices Used None   Stairs Independent   Cognition    Cognition / Consciousness WDL   Level of Consciousness Alert   Comments Cooperative   Passive ROM Lower Body   Passive ROM Lower Body WDL   Active ROM Lower Body    Gross Active ROM Gross Active Range of Motion Impaired,  but Appears Adequate for Functional Mobility.   Comments AROM limited by  abdominal pain "   Strength Lower Body   Gross Strength Generalized Weakness, Equal Bilaterally   Sensation Lower Body   Lower Extremity Sensation   WDL   Lower Body Muscle Tone   Lower Body Muscle Tone  WDL   Coordination Lower Body    Coordination Lower Body  WDL   Balance Assessment   Sitting Balance (Static) Fair   Sitting Balance (Dynamic) Fair -   Standing Balance (Static) Fair +   Standing Balance (Dynamic) Fair   Weight Shift Sitting Poor   Weight Shift Standing Fair   Comments with FWW, limited by pain mostly   Gait Analysis   Gait Level Of Assist Supervised   Assistive Device Front Wheel Walker   Distance (Feet) 15   Deviation Decreased Base Of Support;Bradykinetic;Shuffled Gait;Other (Comment)  (limited by abdominal pain)   Weight Bearing Status FWB   Skilled Intervention Verbal Cuing;Sequencing;Facilitation;Compensatory Strategies   Bed Mobility    Supine to Sit Minimal Assist   Scooting Minimal Assist   Rolling Minimal Assist to Rt.   Skilled Intervention Other (See Comments);Verbal Cuing;Sequencing;Compensatory Strategies  (using log roll for pt comfort)   Functional Mobility   Sit to Stand Minimal Assist   Bed, Chair, Wheelchair Transfer Supervised   Mobility gait around bed to chair   Activity Tolerance   Sitting Edge of Bed 10 min   Standing 5 min   Comments limited by abd pain   Patient / Family Goals    Patient / Family Goal #1 Home   Short Term Goals    Short Term Goal # 1 Pt will be SPV for supine<>sit without bed features in 6 txs to improve functional mobility.   Short Term Goal # 2 Pt will be SPV for all transfers with LRAD in 6 txs to improve functional mobility.   Short Term Goal # 3 Pt will be SPV for gait >200' with LRAD in 6 txs to improve functional mobility.   Education Group   Role of Physical Therapist Patient Response Patient;Acceptance;Explanation;Demonstration;Verbal Demonstration;Action Demonstration   Problem List    Problems Pain;Impaired Bed Mobility;Impaired Transfers;Impaired  Ambulation;Impaired Balance;Decreased Activity Tolerance;Limited Knowledge of Post-Op Precautions   Anticipated Discharge Equipment and Recommendations   DC Equipment Recommendations Unable to determine at this time  (Pt unsure if he has a FWW or 4WW at home.)   Discharge Recommendations Recommend home health for continued physical therapy services

## 2020-08-14 NOTE — PROGRESS NOTES
"Pt A&O x4. Calm, very pleasant    Vitals /60   Pulse 65   Temp 36.6 °C (97.8 °F) (Temporal)   Resp 14   Ht 1.803 m (5' 11\")   Wt 71 kg (156 lb 8.4 oz)   SpO2 95%   BMI 21.83 kg/m²       Pain 5/10. Epidural in place running.    Neuro Denies numbness/tingling. +chills/sahkes from anesthesia     Cardiac Denies chest pain. Regular heart sounds    Vascular pulses +2 BUE, BLE. No edema noted    Respiratory lungs clear to auscultation. On RA. Sating 90%<. Denies SOB. Reinforced education on IS. Best 2000    GI passing flatus, last BM PTA* . Tolerating clear liquid diet. Denies N/V     pt voiding adequately. Hoang catheter in place    MSK Pt ambulating x1. Pt calling appropriately    Skin MLI w/ island dressing CDI. Epidural site CDI scant bloody drainage around insertion    Fall precautions in place. Treaded socks on. Bed locked in lowest position. All belongings at bedside within reach. Call light within reach. Reinforced use of call light to patient.  All needs met at this time and POC discussed.    "

## 2020-08-14 NOTE — PROGRESS NOTES
Report received from day shift RN, assumed Care.   Patient is AOx4, responds appropriately.      Pain controlled at this time via dilaudid/ropivacaine epidural.   Patient is tolerating clear liquid diet, denies nausea/vomiting. + flatus. Midline incision covered with island dressing, CDI.   Up stand by assist with steady gait.    Plan of care discussed, all questions answered.    Educated on use of call light and importance of calling before getting out of bed. Pt verbalizes understanding.    Call light and belongings within reach, treaded slipper socks on, bed alarm in use (due to epidural), SCDs in use, bed in lowest locked position.  All needs met at this time.

## 2020-08-15 ENCOUNTER — ANESTHESIA EVENT (OUTPATIENT)
Dept: ANESTHESIOLOGY | Facility: MEDICAL CENTER | Age: 75
End: 2020-08-15

## 2020-08-15 ENCOUNTER — PATIENT MESSAGE (OUTPATIENT)
Dept: MEDICAL GROUP | Facility: MEDICAL CENTER | Age: 75
End: 2020-08-15

## 2020-08-15 ENCOUNTER — ANESTHESIA (OUTPATIENT)
Dept: ANESTHESIOLOGY | Facility: MEDICAL CENTER | Age: 75
End: 2020-08-15

## 2020-08-15 LAB
ALBUMIN SERPL BCP-MCNC: 3.8 G/DL (ref 3.2–4.9)
ALBUMIN/GLOB SERPL: 1.7 G/DL
ALP SERPL-CCNC: 47 U/L (ref 30–99)
ALT SERPL-CCNC: 16 U/L (ref 2–50)
ANION GAP SERPL CALC-SCNC: 11 MMOL/L (ref 7–16)
AST SERPL-CCNC: 47 U/L (ref 12–45)
BILIRUB SERPL-MCNC: 0.9 MG/DL (ref 0.1–1.5)
BUN SERPL-MCNC: 7 MG/DL (ref 8–22)
CALCIUM SERPL-MCNC: 8.5 MG/DL (ref 8.5–10.5)
CHLORIDE SERPL-SCNC: 89 MMOL/L (ref 96–112)
CO2 SERPL-SCNC: 27 MMOL/L (ref 20–33)
CREAT SERPL-MCNC: 0.66 MG/DL (ref 0.5–1.4)
ERYTHROCYTE [DISTWIDTH] IN BLOOD BY AUTOMATED COUNT: 43.6 FL (ref 35.9–50)
GLOBULIN SER CALC-MCNC: 2.2 G/DL (ref 1.9–3.5)
GLUCOSE SERPL-MCNC: 111 MG/DL (ref 65–99)
HCT VFR BLD AUTO: 32.6 % (ref 42–52)
HGB BLD-MCNC: 11.7 G/DL (ref 14–18)
MCH RBC QN AUTO: 35.5 PG (ref 27–33)
MCHC RBC AUTO-ENTMCNC: 35.9 G/DL (ref 33.7–35.3)
MCV RBC AUTO: 98.8 FL (ref 81.4–97.8)
PLATELET # BLD AUTO: 114 K/UL (ref 164–446)
PMV BLD AUTO: 9.2 FL (ref 9–12.9)
POTASSIUM SERPL-SCNC: 3.8 MMOL/L (ref 3.6–5.5)
PROT SERPL-MCNC: 6 G/DL (ref 6–8.2)
RBC # BLD AUTO: 3.3 M/UL (ref 4.7–6.1)
SODIUM SERPL-SCNC: 127 MMOL/L (ref 135–145)
WBC # BLD AUTO: 13.9 K/UL (ref 4.8–10.8)

## 2020-08-15 PROCEDURE — 700102 HCHG RX REV CODE 250 W/ 637 OVERRIDE(OP): Performed by: SURGERY

## 2020-08-15 PROCEDURE — 36415 COLL VENOUS BLD VENIPUNCTURE: CPT

## 2020-08-15 PROCEDURE — 700105 HCHG RX REV CODE 258: Performed by: SURGERY

## 2020-08-15 PROCEDURE — 770001 HCHG ROOM/CARE - MED/SURG/GYN PRIV*

## 2020-08-15 PROCEDURE — 80053 COMPREHEN METABOLIC PANEL: CPT

## 2020-08-15 PROCEDURE — 85027 COMPLETE CBC AUTOMATED: CPT

## 2020-08-15 PROCEDURE — 97530 THERAPEUTIC ACTIVITIES: CPT

## 2020-08-15 PROCEDURE — 97165 OT EVAL LOW COMPLEX 30 MIN: CPT

## 2020-08-15 PROCEDURE — 700111 HCHG RX REV CODE 636 W/ 250 OVERRIDE (IP): Performed by: SURGERY

## 2020-08-15 PROCEDURE — A9270 NON-COVERED ITEM OR SERVICE: HCPCS | Performed by: SURGERY

## 2020-08-15 PROCEDURE — 97116 GAIT TRAINING THERAPY: CPT

## 2020-08-15 RX ADMIN — HYDROXYCHLOROQUINE SULFATE 200 MG: 200 TABLET, FILM COATED ORAL at 07:42

## 2020-08-15 RX ADMIN — SODIUM CHLORIDE, POTASSIUM CHLORIDE, SODIUM LACTATE AND CALCIUM CHLORIDE: 600; 310; 30; 20 INJECTION, SOLUTION INTRAVENOUS at 16:56

## 2020-08-15 RX ADMIN — KETOROLAC TROMETHAMINE 10 MG: 10 TABLET, FILM COATED ORAL at 07:42

## 2020-08-15 RX ADMIN — HYDROXYCHLOROQUINE SULFATE 200 MG: 200 TABLET, FILM COATED ORAL at 16:56

## 2020-08-15 RX ADMIN — OMEPRAZOLE 20 MG: 20 CAPSULE, DELAYED RELEASE ORAL at 05:35

## 2020-08-15 RX ADMIN — KETOROLAC TROMETHAMINE 10 MG: 10 TABLET, FILM COATED ORAL at 16:56

## 2020-08-15 RX ADMIN — ENOXAPARIN SODIUM 40 MG: 40 INJECTION SUBCUTANEOUS at 20:30

## 2020-08-15 ASSESSMENT — COGNITIVE AND FUNCTIONAL STATUS - GENERAL
MOVING FROM LYING ON BACK TO SITTING ON SIDE OF FLAT BED: UNABLE
WALKING IN HOSPITAL ROOM: A LITTLE
PERSONAL GROOMING: A LITTLE
SUGGESTED CMS G CODE MODIFIER DAILY ACTIVITY: CK
CLIMB 3 TO 5 STEPS WITH RAILING: A LOT
HELP NEEDED FOR BATHING: A LITTLE
SUGGESTED CMS G CODE MODIFIER MOBILITY: CL
MOVING TO AND FROM BED TO CHAIR: UNABLE
MOBILITY SCORE: 12
DRESSING REGULAR UPPER BODY CLOTHING: A LITTLE
DAILY ACTIVITIY SCORE: 19
STANDING UP FROM CHAIR USING ARMS: A LITTLE
DRESSING REGULAR LOWER BODY CLOTHING: A LITTLE
TURNING FROM BACK TO SIDE WHILE IN FLAT BAD: A LOT
TOILETING: A LITTLE

## 2020-08-15 ASSESSMENT — GAIT ASSESSMENTS
GAIT LEVEL OF ASSIST: MINIMAL ASSIST
DISTANCE (FEET): 50
DEVIATION: DECREASED BASE OF SUPPORT;BRADYKINETIC;SHUFFLED GAIT;OTHER (COMMENT)
ASSISTIVE DEVICE: FRONT WHEEL WALKER

## 2020-08-15 ASSESSMENT — ACTIVITIES OF DAILY LIVING (ADL): TOILETING: INDEPENDENT

## 2020-08-15 NOTE — PROGRESS NOTES
"Pt A&O x4. Calm, slightly drowsy. Sitting up in chair for breakfast    Vitals /84   Pulse 81   Temp 37.2 °C (99 °F) (Temporal)   Resp 17   Ht 1.803 m (5' 11\")   Wt 71 kg (156 lb 8.4 oz)   SpO2 91%   BMI 21.83 kg/m²     Pain 5/10. PCA running Dilaudid. Verified x2 RN. PT reports feeling drowsy using. Will start giving toradol to wean off    Neuro Denies numbness/tingling    Cardiac Denies chest pain. Regular heart sounds    Vascular pulses +2 BUE, BLE. No edema noted    Respiratory lungs clear to auscultation. On RA Sating 90%<. Denies SOB. Reinforced education on IS. Best 2000    GI denies passing flatus, last BM PTA. Will ask  for stool softners. Tolerating regular diet. Denies N/V     pt voiding adequately. Viktor removed this AM    MSK Pt ambulating x1 assist. Bed alarm on. Pt calling appropriately    Skin: MLI noted w/ island dressing CDI scant old drainage    Labs noted. NA slightly low 127, WBC slightly elevated 13.9    Fall precautions in place. Treaded socks on. Bed locked in lowest position. All belongings at bedside within reach. Call light within reach. Reinforced use of call light to patient.  All needs met at this time and POC discussed.    "

## 2020-08-15 NOTE — PROGRESS NOTES
Patient was seen and examined.  Ate some of his pancakes for breakfast. Pain moderately well controlled. Vital signs and labs reviewed.  Counseled patient on diet, actively level and progress this far.  Questions answered.  He wishes to go to skilled or rehab post discharge.

## 2020-08-15 NOTE — THERAPY
"Physical Therapy   Daily Treatment     Patient Name: Martin Briceño  Age:  75 y.o., Sex:  male  Medical Record #: [id number]  Today's Date: 8/15/2020     Precautions: Fall Risk    Assessment    Patient is slightly confused today, stating that he is \"not quite himself...it may be due to the pain medication.\"  Patient has concerns about going home including pain management as well as performing ADLs and IADLs as he lives alone and would have to rely on friends and family, which he isn't sure how much they could help.  Additionally, patient continues to require Min A for functional mobility.  Gait did improve with ambulation, however each ambulation episode were limited by patient feeling \"off in his head,\" vitals WNL.  Lack of ambulation this hospital stay could be contributor to this.  Patient does not demonstrate household ambulation functional level, and is not safe to go home at this time.  If patient function improves as pain and medical management improves then patient has potential to be d/c home, however recommend post acute placement for continued physical therapy at this time.         08/15/20 0952   Total Time Spent   Total Time Spent (Mins) 24   Charge Group   Charges  Yes   PT Gait Training 1   PT Therapeutic Activities 1   Precautions   Precautions Fall Risk   Non Verbal Descriptors   Non Verbal Scale  Calm   Balance   Sitting Balance (Static) Fair +   Sitting Balance (Dynamic) Fair -   Standing Balance (Static) Fair +   Standing Balance (Dynamic) Fair   Weight Shift Sitting Poor   Weight Shift Standing Fair   Skilled Intervention Verbal Cuing   Comments w/ FWW   Gait Analysis   Gait Level Of Assist Minimal Assist   Assistive Device Front Wheel Walker   Distance (Feet) 50   # of Times Distance was Traveled 4   Deviation Decreased Base Of Support;Bradykinetic;Shuffled Gait;Other (Comment)   Weight Bearing Status FWB   Skilled Intervention Verbal Cuing   Bed Mobility    Supine to Sit Minimal Assist "   Sit to Supine Minimal Assist   Scooting Minimal Assist   Functional Mobility   Sit to Stand Minimal Assist   How much difficulty does the patient currently have...   Turning over in bed (including adjusting bedclothes, sheets and blankets)? 2   Sitting down on and standing up from a chair with arms (e.g., wheelchair, bedside commode, etc.) 1   Moving from lying on back to sitting on the side of the bed? 1   How much help from another person does the patient currently need...   Moving to and from a bed to a chair (including a wheelchair)? 3   Need to walk in a hospital room? 3   Climbing 3-5 steps with a railing? 2   6 clicks Mobility Score 12   Short Term Goals    Short Term Goal # 1 Pt will be SPV for supine<>sit without bed features in 6 txs to improve functional mobility.   Goal Outcome # 1 goal not met   Short Term Goal # 2 Pt will be SPV for all transfers with LRAD in 6 txs to improve functional mobility.   Goal Outcome # 2 Goal not met   Short Term Goal # 3 Pt will be SPV for gait >200' with LRAD in 6 txs to improve functional mobility.   Goal Outcome # 3 Goal not met   Education Group   Education Provided Role of Physical Therapist   Role of Physical Therapist Patient Response Patient;Acceptance;Explanation;Demonstration;Verbal Demonstration;Action Demonstration   Additional Comments significant discussion of social support at home and what pt would need to do as he lives alone   Anticipated Discharge Equipment and Recommendations   DC Equipment Recommendations Front-Wheel Walker   Discharge Recommendations Recommend post-acute placement for additional physical therapy services prior to discharge home   Interdisciplinary Plan of Care Collaboration   IDT Collaboration with  Nursing   Patient Position at End of Therapy Seated;Chair Alarm On;Call Light within Reach;Tray Table within Reach;Phone within Reach   Collaboration Comments results of PT       Plan    Continue current treatment plan.    DC Equipment  Recommendations: Front-Wheel Walker  Discharge Recommendations: Recommend post-acute placement for additional physical therapy services prior to discharge home.  In order to go home, patient would need significant assist for cooking, cleaning, and would need min assist for bed mobility, transfers, and ambulation.

## 2020-08-15 NOTE — PROGRESS NOTES
Report received   Assumed care of patient at 1915  Pt is A&O x 4  VSS  Pain reported at 6/10 to midline incision - managing with PCA pump  Denies N/V/D, Denies chest pain, denies SOB, denies dizziness  Tolerating Diet   Midline incisions with island dressing - Intact, old drainage noted  PCA pump in place - Q4 vitals  Hoang in place - Adequate Urine output  8/13 Last BM   Passing Flatus  Up stand by assist  SCD's in place    Bed in lowest position and locked.  Pt resting comfortably now.  Review plan of care with patient  Call light within reach  Hourly rounds in place  All needs met at this time

## 2020-08-15 NOTE — DISCHARGE PLANNING
Care Transition Team Assessment    SW met with Pt bedside to complete assessment.  Pt reports that he lives alone with family and friend support.  Pt's current Pharmacy on file is [facility] in [facility].  SW discussed SNF choice.  Pt expressed worry about the current COVID state at SNF's.  SW left SNF choice with Pt bedside he is going to have his family do some research on the facilities and asked that a CM/SW visit him tomorrow to obtain choice.  Pt shared that he would like to go home however knows that he currently needs needs more assistance then HH or his family would be able to provide.      Information Source  Orientation : Oriented x 4  Information Given By: Patient  Informant's Name: Martin  Who is responsible for making decisions for patient? : Patient    Elopement Risk  Legal Hold: No  Ambulatory or Self Mobile in Wheelchair: Yes  Disoriented: No  Psychiatric Symptoms: None  History of Wandering: No  Elopement this Admit: No  Vocalizing Wanting to Leave: No  Displays Behaviors, Body Language Wanting to Leave: No-Not at Risk for Elopement  Elopement Risk: Not at Risk for Elopement    Interdisciplinary Discharge Planning  Lives with - Patient's Self Care Capacity: Alone and Able to Care For Self  Patient or legal guardian wants to designate a caregiver (see row info): No  Housing / Facility: [address]  Prior Services: Home-Independent    Discharge Preparedness  What is your plan after discharge?: Skilled nursing facility  What are your discharge supports?: Sibling  Prior Functional Level: Independent with Activities of Daily Living    Functional Assesment  Prior Functional Level: Independent with Activities of Daily Living    Finances  Financial Barriers to Discharge: No  Prescription Coverage: Yes    Vision / Hearing Impairment  Vision Impairment : No  Right Eye Vision: Impaired, Wears Glasses  Left Eye Vision: Impaired, Wears Glasses  Hearing Impairment : No    Advance Directive  Advance  Directive?: DPOA for Health Care    Domestic Abuse  Have you ever been the victim of abuse or violence?: No  Physical Abuse or Sexual Abuse: No  Verbal Abuse or Emotional Abuse: No  Possible Abuse Reported to:: Not Applicable    Psychological Assessment  History of Substance Abuse: None  History of Psychiatric Problems: No    Discharge Risks or Barriers  Discharge risks or barriers?: No    Anticipated Discharge Information  Discharge Disposition: D/T to SNF with Medicare cert in anticipation of skilled care (03)

## 2020-08-15 NOTE — PROGRESS NOTES
D/Cd hernandez catheter per order. Educated pt on urinating in urinal by 1400. Verbalized understanding

## 2020-08-16 LAB
ALBUMIN SERPL BCP-MCNC: 3.5 G/DL (ref 3.2–4.9)
ALBUMIN/GLOB SERPL: 1.3 G/DL
ALP SERPL-CCNC: 49 U/L (ref 30–99)
ALT SERPL-CCNC: 21 U/L (ref 2–50)
ANION GAP SERPL CALC-SCNC: 12 MMOL/L (ref 7–16)
AST SERPL-CCNC: 56 U/L (ref 12–45)
BILIRUB SERPL-MCNC: 0.9 MG/DL (ref 0.1–1.5)
BUN SERPL-MCNC: 9 MG/DL (ref 8–22)
CALCIUM SERPL-MCNC: 8.7 MG/DL (ref 8.5–10.5)
CHLORIDE SERPL-SCNC: 96 MMOL/L (ref 96–112)
CO2 SERPL-SCNC: 27 MMOL/L (ref 20–33)
CREAT SERPL-MCNC: 0.76 MG/DL (ref 0.5–1.4)
ERYTHROCYTE [DISTWIDTH] IN BLOOD BY AUTOMATED COUNT: 43.1 FL (ref 35.9–50)
GLOBULIN SER CALC-MCNC: 2.6 G/DL (ref 1.9–3.5)
GLUCOSE SERPL-MCNC: 97 MG/DL (ref 65–99)
HCT VFR BLD AUTO: 31.8 % (ref 42–52)
HGB BLD-MCNC: 11.2 G/DL (ref 14–18)
MCH RBC QN AUTO: 35.2 PG (ref 27–33)
MCHC RBC AUTO-ENTMCNC: 35.2 G/DL (ref 33.7–35.3)
MCV RBC AUTO: 100 FL (ref 81.4–97.8)
PLATELET # BLD AUTO: 130 K/UL (ref 164–446)
PMV BLD AUTO: 9 FL (ref 9–12.9)
POTASSIUM SERPL-SCNC: 4.1 MMOL/L (ref 3.6–5.5)
PROT SERPL-MCNC: 6.1 G/DL (ref 6–8.2)
RBC # BLD AUTO: 3.18 M/UL (ref 4.7–6.1)
SODIUM SERPL-SCNC: 135 MMOL/L (ref 135–145)
WBC # BLD AUTO: 12.1 K/UL (ref 4.8–10.8)

## 2020-08-16 PROCEDURE — A9270 NON-COVERED ITEM OR SERVICE: HCPCS | Performed by: SURGERY

## 2020-08-16 PROCEDURE — 770001 HCHG ROOM/CARE - MED/SURG/GYN PRIV*

## 2020-08-16 PROCEDURE — 85027 COMPLETE CBC AUTOMATED: CPT

## 2020-08-16 PROCEDURE — A9270 NON-COVERED ITEM OR SERVICE: HCPCS | Performed by: COLON & RECTAL SURGERY

## 2020-08-16 PROCEDURE — 700102 HCHG RX REV CODE 250 W/ 637 OVERRIDE(OP): Performed by: SURGERY

## 2020-08-16 PROCEDURE — 700102 HCHG RX REV CODE 250 W/ 637 OVERRIDE(OP): Performed by: COLON & RECTAL SURGERY

## 2020-08-16 PROCEDURE — 36415 COLL VENOUS BLD VENIPUNCTURE: CPT

## 2020-08-16 PROCEDURE — 700111 HCHG RX REV CODE 636 W/ 250 OVERRIDE (IP): Performed by: SURGERY

## 2020-08-16 PROCEDURE — 80053 COMPREHEN METABOLIC PANEL: CPT

## 2020-08-16 RX ORDER — HYDROMORPHONE HYDROCHLORIDE 2 MG/1
2 TABLET ORAL
Status: DISCONTINUED | OUTPATIENT
Start: 2020-08-16 | End: 2020-08-17

## 2020-08-16 RX ADMIN — ENOXAPARIN SODIUM 40 MG: 40 INJECTION SUBCUTANEOUS at 20:17

## 2020-08-16 RX ADMIN — HYDROMORPHONE HYDROCHLORIDE 2 MG: 2 TABLET ORAL at 17:10

## 2020-08-16 RX ADMIN — Medication: at 01:53

## 2020-08-16 RX ADMIN — HYDROMORPHONE HYDROCHLORIDE 2 MG: 2 TABLET ORAL at 13:58

## 2020-08-16 RX ADMIN — HYDROXYCHLOROQUINE SULFATE 200 MG: 200 TABLET, FILM COATED ORAL at 09:18

## 2020-08-16 RX ADMIN — OMEPRAZOLE 20 MG: 20 CAPSULE, DELAYED RELEASE ORAL at 04:31

## 2020-08-16 RX ADMIN — KETOROLAC TROMETHAMINE 10 MG: 10 TABLET, FILM COATED ORAL at 20:17

## 2020-08-16 RX ADMIN — HYDROXYCHLOROQUINE SULFATE 200 MG: 200 TABLET, FILM COATED ORAL at 17:10

## 2020-08-16 RX ADMIN — HYDROMORPHONE HYDROCHLORIDE 2 MG: 2 TABLET ORAL at 20:17

## 2020-08-16 NOTE — CARE PLAN
Problem: Knowledge Deficit  Goal: Knowledge of disease process/condition, treatment plan, diagnostic tests, and medications will improve  Outcome: PROGRESSING AS EXPECTED  Note: Pt updated on plan of care, educated about medications, labs     Problem: Pain Management  Goal: Pain level will decrease to patient's comfort goal  Outcome: PROGRESSING AS EXPECTED  Note: PCA d/c this morning, pt educated about new PO PRN pain medications

## 2020-08-16 NOTE — DISCHARGE PLANNING
Anticipated Discharge Disposition: SNF    Action: Spoke with patient's Sister, Mattie, per patient's request. Talked about the SNF choices and the process involved. All questions answered.    Barriers to Discharge: SNF placement, medical clearance.    Plan: Awaiting SNF acceptance.

## 2020-08-16 NOTE — PROGRESS NOTES
Bedside report received. Assessment completed.  Pt is A&O x4. Pt on room air.   Pain 3/10. PCA pump in use. Orders to d/c pump. Pt educated about new PRN PO pain medications.  Denies nausea.   - numbness, - tingling.  Surgical midline incision with island dressing in place. Dry and intact with old drainage.  Small dressing to mid back with gauze and tegaderm, CDI.  Last BM PTA. -flatus, +void.  Regular diet. Tolerates well.   Pt up x1-SBA. Tolerates well.   Call light within reach. All needs met at this time. Fall Precautions and hourly rounding in place.

## 2020-08-16 NOTE — DISCHARGE PLANNING
Anticipated Discharge Disposition: SNF    Action: Obtained SNF choice from patient. Choice form faxed to [facility]. Fax confirmation at [phone].    1. [facility]  2. [facility]  3. [facility].    Barriers to Discharge: SNF acceptance.    Plan: Awaiting SNF acceptance.

## 2020-08-16 NOTE — THERAPY
Occupational Therapy   Initial Evaluation     Patient Name: Martin Briceño  Age:  75 y.o., Sex:  male  Medical Record #: [id number]  Today's Date: 8/15/2020     Precautions  Precautions: Fall Risk    Assessment  Patient is 75 y.o. male presents to skilled OT services following s/p distal panc and spleen. Pt currently demonstrates decreased activity tolerance and balance required to safely and independently complete ADls and IADLs at this time, has limited support in the community. Pt will benefit from acute skilled OT services while in house and post acute placement recommended prior to d/c home.      Plan    Recommend Occupational Therapy 4 times per week until therapy goals are met for the following treatments:  Adaptive Equipment, Self Care/Activities of Daily Living, Therapeutic Activities and Therapeutic Exercises.    DC Equipment Recommendations: Unable to determine at this time  Discharge Recommendations: Recommend post-acute placement for additional occupational therapy services prior to discharge home     Objective       08/15/20 1637   Prior Living Situation   Prior Services Home-Independent   Housing / Facility [address]   Bathroom Set up Walk In Shower;Bathtub / Shower Combination   Equipment Owned Front-Wheel Walker   Lives with - Patient's Self Care Capacity Alone and Able to Care For Self   Comments I with ADLs/IADLs baseline. Reports there's family and friends but they can provide limited support    Prior Level of ADL Function   Self Feeding Independent   Grooming / Hygiene Independent   Bathing Independent   Dressing Independent   Toileting Independent   Prior Level of IADL Function   Medication Management Independent   Laundry Independent   Kitchen Mobility Independent   Finances Independent   Home Management Independent   Shopping Independent   Prior Level Of Mobility Independent Without Device in Community   Driving / Transportation Driving Independent   Balance Assessment   Sitting  Balance (Static) Fair +   Sitting Balance (Dynamic) Fair -   Standing Balance (Static) Fair -   Standing Balance (Dynamic) Fair -   Weight Shift Sitting Fair   Weight Shift Standing Fair   Comments pushing IV pole   Bed Mobility    Supine to Sit   (recieved sitting up in chair)   Sit to Supine Minimal Assist   Scooting Supervised  (seated eob)   Rolling Minimal Assist to Rt.   Comments flat hob and use of bed rail   ADL Assessment   Eating Modified Independent   Grooming Supervision;Standing   Bathing   (NT)   Upper Body Dressing Minimal Assist   Lower Body Dressing Minimal Assist  (increased abd pain with LB dressing)   Toileting Minimal Assist   Comments limited by sx site pain with LB ADLs   Functional Mobility   Sit to Stand Minimal Assist   Bed, Chair, Wheelchair Transfer Minimal Assist   Toilet Transfers Minimal Assist  (cga/min a)   Comments pushing IV pole, no lob noted   Short Term Goals   Short Term Goal # 1 Pt will perform LB dressing with supervision using AE    Short Term Goal # 2 Pt will tolerate oob ADL session [id number] w/o overt c/o pain and faituge   Short Term Goal # 3 Pt will perform toileting task with supervision   Anticipated Discharge Equipment and Recommendations   DC Equipment Recommendations Unable to determine at this time

## 2020-08-16 NOTE — DISCHARGE PLANNING
Received Choice form at 1100  Agency[facility]Facility Name: [facility]  Referral sent per Choice form @ 0415

## 2020-08-16 NOTE — PROGRESS NOTES
Patient was seen and examined.  Denies flatus or BM yet, is tolerating PO without emesis. Abdomen flat, nondistended, dressing minimal saturation. Vital signs and labs reviewed.  Counseled patient on diet, actively level and progress this far.  Questions answered.  He is discussing with case management placement, does not feel strong enough to go home. Encouraged to walk halls today.

## 2020-08-17 VITALS
TEMPERATURE: 97.6 F | SYSTOLIC BLOOD PRESSURE: 132 MMHG | RESPIRATION RATE: 17 BRPM | OXYGEN SATURATION: 93 % | HEIGHT: 71 IN | DIASTOLIC BLOOD PRESSURE: 79 MMHG | WEIGHT: 156.53 LBS | HEART RATE: 69 BPM | BODY MASS INDEX: 21.91 KG/M2

## 2020-08-17 PROCEDURE — 700102 HCHG RX REV CODE 250 W/ 637 OVERRIDE(OP): Performed by: COLON & RECTAL SURGERY

## 2020-08-17 PROCEDURE — A9270 NON-COVERED ITEM OR SERVICE: HCPCS | Performed by: COLON & RECTAL SURGERY

## 2020-08-17 PROCEDURE — 700102 HCHG RX REV CODE 250 W/ 637 OVERRIDE(OP): Performed by: SURGERY

## 2020-08-17 PROCEDURE — A9270 NON-COVERED ITEM OR SERVICE: HCPCS | Performed by: SURGERY

## 2020-08-17 RX ORDER — OXYCODONE HYDROCHLORIDE 5 MG/1
5 TABLET ORAL EVERY 4 HOURS PRN
Qty: 30 TAB | Refills: 0 | Status: SHIPPED | OUTPATIENT
Start: 2020-08-17 | End: 2020-08-26

## 2020-08-17 RX ORDER — CELECOXIB 200 MG/1
200 CAPSULE ORAL DAILY
Qty: 60 CAP | Status: SHIPPED
Start: 2020-08-18 | End: 2020-08-26

## 2020-08-17 RX ORDER — CELECOXIB 200 MG/1
200 CAPSULE ORAL DAILY
Status: DISCONTINUED | OUTPATIENT
Start: 2020-08-17 | End: 2020-08-17 | Stop reason: HOSPADM

## 2020-08-17 RX ORDER — PROMETHAZINE HYDROCHLORIDE 12.5 MG/1
12.5-25 TABLET ORAL EVERY 6 HOURS PRN
Qty: 30 TAB | Refills: 0 | Status: SHIPPED
Start: 2020-08-17 | End: 2020-08-26

## 2020-08-17 RX ORDER — ACETAMINOPHEN 500 MG
1000 TABLET ORAL EVERY 6 HOURS PRN
Qty: 30 TAB | Refills: 0 | Status: SHIPPED
Start: 2020-08-17 | End: 2020-08-26

## 2020-08-17 RX ORDER — LEVOTHYROXINE SODIUM 0.12 MG/1
125 TABLET ORAL
Status: DISCONTINUED | OUTPATIENT
Start: 2020-08-17 | End: 2020-08-17 | Stop reason: HOSPADM

## 2020-08-17 RX ORDER — POLYETHYLENE GLYCOL 3350 17 G/17G
17 POWDER, FOR SOLUTION ORAL DAILY
Refills: 3 | Status: SHIPPED
Start: 2020-08-18 | End: 2020-08-26

## 2020-08-17 RX ORDER — MORPHINE SULFATE 4 MG/ML
1 INJECTION, SOLUTION INTRAMUSCULAR; INTRAVENOUS
Status: DISCONTINUED | OUTPATIENT
Start: 2020-08-17 | End: 2020-08-17 | Stop reason: HOSPADM

## 2020-08-17 RX ORDER — OXYCODONE HYDROCHLORIDE 5 MG/1
5 TABLET ORAL EVERY 4 HOURS PRN
Status: DISCONTINUED | OUTPATIENT
Start: 2020-08-17 | End: 2020-08-17 | Stop reason: HOSPADM

## 2020-08-17 RX ORDER — POLYETHYLENE GLYCOL 3350 17 G/17G
1 POWDER, FOR SOLUTION ORAL DAILY
Status: DISCONTINUED | OUTPATIENT
Start: 2020-08-17 | End: 2020-08-17 | Stop reason: HOSPADM

## 2020-08-17 RX ORDER — FLUTICASONE PROPIONATE 50 MCG
2 SPRAY, SUSPENSION (ML) NASAL DAILY
Status: DISCONTINUED | OUTPATIENT
Start: 2020-08-17 | End: 2020-08-17 | Stop reason: HOSPADM

## 2020-08-17 RX ADMIN — OXYCODONE 5 MG: 5 TABLET ORAL at 12:55

## 2020-08-17 RX ADMIN — HYDROMORPHONE HYDROCHLORIDE 2 MG: 2 TABLET ORAL at 02:08

## 2020-08-17 RX ADMIN — OMEPRAZOLE 20 MG: 20 CAPSULE, DELAYED RELEASE ORAL at 05:08

## 2020-08-17 RX ADMIN — CELECOXIB 200 MG: 200 CAPSULE ORAL at 09:51

## 2020-08-17 RX ADMIN — ACETAMINOPHEN 1000 MG: 500 TABLET ORAL at 00:26

## 2020-08-17 RX ADMIN — LEVOTHYROXINE SODIUM 125 MCG: 0.12 TABLET ORAL at 09:51

## 2020-08-17 RX ADMIN — KETOROLAC TROMETHAMINE 10 MG: 10 TABLET, FILM COATED ORAL at 02:08

## 2020-08-17 RX ADMIN — HYDROXYCHLOROQUINE SULFATE 200 MG: 200 TABLET, FILM COATED ORAL at 06:38

## 2020-08-17 RX ADMIN — POLYETHYLENE GLYCOL 3350 1 PACKET: 17 POWDER, FOR SOLUTION ORAL at 09:51

## 2020-08-17 NOTE — DISCHARGE PLANNING
Care Transition Team Discharge Planning    Anticipated Discharge Information  Discharge Disposition: D/T to SNF with Medicare cert in anticipation of skilled care (03)       Discharge Plan:     Pt has a completed PASRR and the number is [id number].     Pt ' s [facility]/transport packet prepared.   This RN CM notified Cruzito Briceño, Pt's Brother in law of Pt's discharge to [facility] ED of this update.

## 2020-08-17 NOTE — DISCHARGE PLANNING
Care Transition Team Discharge Planning    Anticipated Discharge Information  Discharge Disposition: D/T to SNF with Medicare cert in anticipation of skilled care (03)          [facility]charge Plan:  [facility]     Per Dr Gann, Pt is medically clear to discharge to SNF.  Will prepare [facility]/transfer packet .  Will check PASRR .    This RN CM will follow and assist Pt with discharge as needed.

## 2020-08-17 NOTE — PROGRESS NOTES
A&O x's 4, calls appropriately, denies any pain at this time, ambulates with standby assist, steady gait. Tolerating regular diet, denies N/V/D, Miralax administered. Midline abdominal incision with staples, HANS. + UO, + flatus, last BM pta. On RA, patient denies SOB or CP. POC reviewed with patient. Call light and personal belongings within reach. Bed in locked, lowest position, call light and personal items within reach, will continue to advance to discharge.

## 2020-08-17 NOTE — DISCHARGE PLANNING
Care Transition Team Discharge Planning    Anticipated Discharge Information  Discharge Disposition: D/T to SNF with Medicare cert in anticipation of skilled care (03)    Discharge Plan:  This RN CM faxed Pt's transfer packet to [place], [facility]. Explained to [place] that the Transfer packet is complete documentation of Pt's Medical Progress Report, Labs, Procedures and Medications.

## 2020-08-17 NOTE — DISCHARGE INSTRUCTIONS
"              After Visit Summary   2017    Kev Shelley    MRN: [id number]           Patient Information     Date Of Birth          1965        Visit Information        Provider Department      2017 3:20 PM Tish Bates PT [facility]        Today's Diagnoses     Sprain of lumbar region, subsequent encounter    -  1       Follow-ups after your visit        Who to contact     If you have questions or need follow up information about today's clinic visit or your schedule please contact [facility] directly at [phone].  Normal or non-critical lab and imaging results will be communicated to you by [employer], letter or phone within 4 business days after the clinic has received the results. If you do not hear from us within 7 days, please contact the clinic through [employer] or phone. If you have a critical or abnormal lab result, we will notify you by phone as soon as possible.  Submit refill requests through [employer] or call your pharmacy and they will forward the refill request to us. Please allow 3 business days for your refill to be completed.          Additional Information About Your Visit        MyChart Information     [employer] lets you send messages to your doctor, view your test results, renew your prescriptions, schedule appointments and more. To sign up, go to [email or web address] . Click on \"Log in\" on the left side of the screen, which will take you to the Welcome page. Then click on \"Sign up Now\" on the right side of the page.     You will be asked to enter the access code listed below, as well as some personal information. Please follow the directions to create your username and password.     Your access code is: [email or web address]  Expires: 2017  4:14 PM     Your access code will  in 90 days. If you need help or a new code, please call your [place] clinic or " Pancreatic Cancer    The pancreas is a gland in the abdomen between the stomach and the spine. The pancreas makes hormones that control blood sugar and helps the body use and store energy that comes from food. The pancreas also makes enzymes that help digest food. Pancreatic cancer is when there is a tumor in the pancreas that is cancerous (malignant).  There are two types of pancreatic cancer:  · Exocrine. This is the most common type.  · Endocrine. This is also called islet cell cancer.  Pancreatic cancer can spread (metastasize) to other parts of the body.  What are the causes?  The exact cause of this condition is not known.  What increases the risk?  The following factors may make you more likely to develop this condition:  · Being over 65 years old.  · Smoking cigarettes.  · Having a family history of cancer of the pancreas, colon, or ovaries.  · Having diabetes.  · Having long-term inflammation of the pancreas (chronic pancreatitis).  · Being exposed to certain chemicals.  · Being obese and having a decreased level of physical activity.  · Eating a diet that is high in fat and red meat.  · Having certain hereditary conditions.  What are the signs or symptoms?  In the early stages, there are often no symptoms of this condition. As the cancer gets worse, symptoms may vary depending on the type of pancreatic cancer you have. Common symptoms include:  · Nausea and vomiting.  · Loss of appetite and unintended weight loss.  · Pain in the upper abdomen or upper back.  · Skin or the white parts of the eyes turning yellow (jaundice).  · Fatigue.  Other symptoms include:  · Itchy skin.  · Dark urine.  · Stools that are light-colored and greasy-looking, or stools that are black and tarry-looking.  · A lump under the rib cage on the right side.  · High blood sugar (hyperglycemia). This may cause increased thirst and frequent urination.  · Low blood sugar (hypoglycemia). This may cause confusion, sweating, and a fast  [phone].        Care EveryWhere ID     This is your Care EveryWhere ID. This could be used by other organizations to access your [place] medical records  [id number]         Blood Pressure from Last 3 Encounters:   No data found for BP    Weight from Last 3 Encounters:   No data found for Wt              We Performed the Following     ELECTRIC STIMULATION THERAPY     HC PT EVAL, LOW COMPLEXITY     ROBY INITIAL EVAL REPORT     THERAPEUTIC EXERCISES        Primary Care Provider    None Specified       No primary provider on file.        Thank you!     Thank you for choosing [place] [facility]  for your care. Our goal is always to provide you with excellent care. Hearing back from our patients is one way we can continue to improve our services. Please take a few minutes to complete the written survey that you may receive in the mail after your visit with us. Thank you!             Your Updated Medication List - Protect others around you: Learn how to safely use, store and throw away your medicines at www.disposemymeds.org.      Notice  As of 4/27/2017  4:14 PM    You have not been prescribed any medications.       heartbeat.  · Depression.  How is this diagnosed?  This condition may be diagnosed based on your medical history and a physical exam. Your health care provider may check your:  · Skin and eyes for signs of jaundice.  · Abdomen for any changes in the areas near the pancreas. Your health care provider may also check for excess fluid in the abdomen.  You may also have other tests, including:  · Blood and urine tests.  · Genetic testing.  · Ultrasound.  · CT scan.  · MRI.  · A biopsy. A sample of pancreatic tissue would be removed and looked at under a microscope.  If pancreatic cancer is diagnosed, it will be staged to determine its severity and extent. Staging is an assessment of:  · The size of the tumor.  · If the cancer has spread.  · Where the cancer has spread.  How is this treated?  Depending on the type and stage of your pancreatic cancer, treatment may include:  · Surgery to remove all or part of the pancreas, or to remove the tumor.  · Chemotherapy. This uses medicine to destroy the cancer cells.  · Radiation therapy. This uses high-energy beams to kill cancer cells.  · Medicine to attack a tumor's genes and proteins (targeted therapy). These medicines attack the genes and proteins that allow a tumor to grow while limiting damage to healthy cells.  · Participating in clinical trials to see if new (experimental) treatments are effective.  · Medicines to help manage pain and other symptoms.  Your health care provider may recommend a combination of surgery, radiation therapy, and chemotherapy. You may be referred to a health care provider who specializes in cancer (oncologist).  Follow these instructions at home:  Medicines  · Take over-the-counter and prescription medicines only as told by your health care provider.  · Ask your health care provider about changing or stopping your regular medicines. This is especially important if you are taking diabetes medicines or blood thinners.  · Do not take dietary  supplements or herbal medicines unless your health care provider tells you to take them. Some supplements can interfere with how well the treatment works.  · Ask your health care provider if the medicine prescribed to you:  ? Requires you to avoid driving or using heavy machinery.  ? Can cause constipation. You may need to take these actions to prevent or treat constipation:  § Drink enough fluid to keep your urine pale yellow.  § Take over-the-counter or prescription medicines.  § Eat foods that are high in fiber, such as beans, whole grains, and fresh fruits and vegetables.  § Limit foods that are high in fat and processed sugars, such as fried or sweet foods.  Lifestyle  · Get enough sleep on a regular basis. Most adults need 6-8 hours of sleep each night. During treatment, you may need more sleep.  · Rest as told by your health care provider.  · Consider joining a cancer support group. Ask your health care provider for more information about local and online support groups. This may help you learn to cope with the stress of having pancreatic cancer.  · Do not use any products that contain nicotine or tobacco, such as cigarettes, e-cigarettes, and chewing tobacco. If you need help quitting, ask your health care provider.  Eating and drinking  · Try to eat regular, healthy meals. Some of your treatments might affect your appetite. If you are having problems eating or with your appetite, ask to meet with a food and nutrition specialist (dietitian).  · Do not drink alcohol.  General instructions  · Return to your normal activities as told by your health care provider. Ask your health care provider what activities are safe for you.  · Work with your health care provider to manage any side effects of your treatment.  · Keep all follow-up visits as told by your health care provider. This is important.  Where to find more information  · American Cancer Society: https://www.cancer.org  · National Cancer [place] (NCI):  https://www.cancer.gov  Contact a health care provider if:  · You have unexplained weight loss.  Get help right away if:  · Your pain suddenly gets worse.  · Your skin or eyes turn more yellow.  · You cannot eat or drink without vomiting.  · You have:  ? Trouble breathing.  ? Chest pain or an irregular heartbeat.  ? Blood in your vomit or dark, tarry stools.  ? New fatigue or weakness.  ? Abdominal bloating or pain.  These symptoms may represent a serious problem that is an emergency. Do not wait to see if the symptoms will go away. Get medical help right away. Call your local emergency services (911 in the [place] Do not drive yourself to the hospital.  Summary  · Pancreatic cancer is a tumor in the pancreas that is cancerous (malignant).  · Risk factors include having a family history of cancer of the pancreas, colon, or ovaries. Risk factors also include having long-term inflammation of the pancreas (chronic pancreatitis) and diabetes.  · Treatment may include a combination of surgery, medicine to destroy cancer cells (chemotherapy), and high-energy beams to kill cancer cells (radiation therapy).  · Consider joining a cancer support group. This may help you learn to cope with the stress of having pancreatic cancer.  · Keep all follow-up visits as told by your health care provider. This is important.  This information is not intended to replace advice given to you by your health care provider. Make sure you discuss any questions you have with your health care provider.  Document Released: 12/02/2005 Document Revised: 03/19/2020 Document Reviewed: 03/26/2020  [employer] Patient Education © 2020 [employer]      Open Splenectomy, Care After  This sheet gives you information about how to care for yourself after your procedure. Your health care provider may also give you more specific instructions. If you have problems or questions, contact your health care provider.  What can I expect after the procedure?  After the  procedure, it is common to have:  · Mild abdominal pain.  · Lack of energy.  Follow these instructions at home:  Medicines  · Take over-the-counter and prescription medicines only as told by your health care provider.  · Ask your health care provider if the medicine prescribed to you:  ? Requires you to avoid driving or using heavy machinery.  ? Can cause constipation. You may need to take actions to prevent or treat constipation, such as:  § Take over-the-counter or prescription medicines.  § Eat foods that are high in fiber, such as beans, whole grains, and fresh fruits and vegetables.  § Limit foods that are high in fat and processed sugars, such as fried or sweet foods.  · If you were prescribed an antibiotic medicine, take it as told by your health care provider. Do not stop taking the antibiotic even if you start to feel better.  · Talk with your health care provider about the need for vaccinations to help prevent infections. Not having a spleen may affect your body's ability to fight infections and can make certain infections more dangerous.  Incision care    · Follow instructions from your health care provider about how to take care of your incision. Make sure you:  ? Wash your hands with soap and water before and after you change your bandage (dressing). If soap and water are not available, use hand .  ? Change your dressing as told by your health care provider.  ? Leave stitches (sutures), skin glue, or adhesive strips in place. These skin closures may need to be in place for 2 weeks or longer. If adhesive strip edges start to loosen and curl up, you may trim the loose edges. Do not remove adhesive strips completely unless your health care provider tells you to do that.  · Check your incision area every day for signs of infection. Check for:  ? Redness, swelling, or pain.  ? Fluid or blood.  ? Warmth.  ? Pus or a bad smell.  · If you were sent home with a surgical drain in place, follow  instructions from your health care provider about how to care for it.  · Do not take baths, swim, or use a hot tub until your health care provider approves. Ask your health care provider if you may take showers. You may only be allowed to take sponge baths.  Eating and drinking  · Drink enough fluid to keep your urine pale yellow.  · Return to your normal diet as told by your health care provider.  Activity    · Return to your normal activities as told by your health care provider. Ask your health care provider what activities are safe for you.  · Avoid strenuous activity for 4 weeks or as told by your health care provider.  · Do not lift anything that is heavier than 10 lb (4.5 kg), or the limit that you are told, until your health care provider says that it is safe.  · Rest as told by your health care provider.  · Avoid sitting for a long time without moving. Get up to take short walks every 1-2 hours. This is important to improve blood flow and breathing. Ask for help if you feel weak or unsteady.  · Ask your health care provider when it is safe to drive and go back to work.  General instructions  · Continue to practice deep breathing as told by your health care provider.  · Always tell your health care providers that you do not have a spleen before you have any procedures. These include medical and dental procedures.  · Keep all follow-up visits as told by your health care provider. This is important.  Contact a health care provider if:  · You have pain that is not helped by medicine.  · You have a fever or chills.  · You have redness, swelling, or pain around your incision.  · You have fluid or blood coming from your incision.  · Your incision feels warm to the touch.  · You have pus or a bad smell coming from your incision.  · You have nausea or vomiting.  Get help right away if:  · Your pain gets much worse.  · Your legs are red, swollen, or painful.  · You have chest pain.  · You have trouble  breathing.  · You suddenly feel very weak or dizzy.  Summary  · Get up to take short walks every 1-2 hours. This is important to improve blood flow and breathing.  · Check your incision area every day for signs of infection, such as redness or swelling.  · Talk with your health care provider about whether you need vaccinations to help prevent infections.  · Do not lift anything that is heavier than 10 lb (4.5 kg), or the limit that you are told, until your health care provider says that it is safe.  This information is not intended to replace advice given to you by your health care provider. Make sure you discuss any questions you have with your health care provider.  Document Released: 04/13/2012 Document Revised: 10/17/2019 Document Reviewed: 10/17/2019  [employer] Patient Education © 2020 [employer]  Discharge Instructions    Discharged to other by medical transportation with escort. Discharged via ambulance, hospital escort: Yes.  Special equipment needed: Not Applicable    Be sure to schedule a follow-up appointment with your primary care doctor or any specialists as instructed.     Discharge Plan:   Diet Plan: Discussed  Activity Level: Discussed  Confirmed Follow up Appointment: Patient to Call and Schedule Appointment  Confirmed Symptoms Management: Discussed  Medication Reconciliation Updated: Yes    I understand that a diet low in cholesterol, fat, and sodium is recommended for good health. Unless I have been given specific instructions below for another diet, I accept this instruction as my diet prescription.   Other diet: Regular as tolerated     Special Instructions: None    · Is patient discharged on Warfarin / Coumadin?   No     Depression / Suicide Risk    As [facility] are [facility], it is important to learn how to keep safe from harming yourself.    Recognize the warning signs:  · Abrupt changes in personality, positive or negative- including increase in energy    · Giving away possessions  · Change in eating patterns- significant weight changes-  positive or negative  · Change in sleeping patterns- unable to sleep or sleeping all the time   · Unwillingness or inability to communicate  · Depression  · Unusual sadness, discouragement and loneliness  · Talk of wanting to die  · Neglect of personal appearance   · Rebelliousness- reckless behavior  · Withdrawal from people/activities they love  · Confusion- inability to concentrate     If you or a loved one observes any of these behaviors or has concerns about self-harm, here's what you can do:  · Talk about it- your feelings and reasons for harming yourself  · Remove any means that you might use to hurt yourself (examples: pills, rope, extension cords, firearm)  · Get professional help from the community (Mental Health, Substance Abuse, psychological counseling)  · Do not be alone:Call your Safe Contact- someone whom you trust who will be there for you.  · Call your local CRISIS HOTLINE 835-0274 or 970-685-2036  · Call your local Children's Mobile Crisis Response Team [place] (356) 642-2541 or www.Contract Live  · Call the toll free National Suicide Prevention Hotlines   · National Suicide Prevention Lifeline 578-489-KSLF (5163)  · National Hope Line Network 800-SUICIDE (933-1831)

## 2020-08-17 NOTE — CARE PLAN
Problem: Communication  Goal: The ability to communicate needs accurately and effectively will improve  Outcome: PROGRESSING AS EXPECTED  Note: Pt communicates needs appropriately      Problem: Safety  Goal: Will remain free from injury  Outcome: PROGRESSING AS EXPECTED  Goal: Will remain free from falls  Outcome: PROGRESSING AS EXPECTED  Note: Safety precautions in place, bed alarm on      Problem: Discharge Barriers/Planning  Goal: Patient's continuum of care needs will be met  Outcome: PROGRESSING AS EXPECTED     Problem: Pain Management  Goal: Pain level will decrease to patient's comfort goal  Outcome: PROGRESSING AS EXPECTED

## 2020-08-17 NOTE — PROGRESS NOTES
Bedside report received.  Assessment complete.  A&O x 4. Patient calls appropriately.  Patient ambulates with standby assist.   Patient has 4/10 pain. Pain managed with prescribed medications.  Denies N&V. Tolerating regular diet.  Surgical midline incision.  + void, + flatus, last BM pta.  Patient denies SOB.  Review plan with of care with patient. Call light and personal belongings with in reach. Hourly rounding in place. All needs met at this time.

## 2020-08-17 NOTE — DISCHARGE PLANNING
@0970  Agency/Facility Name: [facility]  Spoke To: Admission  Outcome: Chemo?    Agency[facility]Facility Name: [facility]  Spoke To: Julia  Outcome: Accepted.    Agency/Facility Name: [facility]  Spoke To: Nathaly  Outcome: Can accept as long as patient does not need chemo or radiation.    Received Choice form at 0935  Agency/Facility Name: [facility], [facility], [facility]  Referral sent per Choice form @ 0935   Referrals were not sent yesterday.

## 2020-08-19 ENCOUNTER — PATIENT MESSAGE (OUTPATIENT)
Dept: MEDICAL GROUP | Facility: MEDICAL CENTER | Age: 75
End: 2020-08-19

## 2020-08-19 DIAGNOSIS — R19.7 DIARRHEA, UNSPECIFIED TYPE: ICD-10-CM

## 2020-08-19 DIAGNOSIS — Z11.59 ENCOUNTER FOR SCREENING FOR OTHER VIRAL DISEASES: ICD-10-CM

## 2020-08-20 ENCOUNTER — APPOINTMENT (OUTPATIENT)
Dept: MEDICAL GROUP | Facility: MEDICAL CENTER | Age: 75
End: 2020-08-20
Payer: MEDICARE

## 2020-08-21 ENCOUNTER — PATIENT OUTREACH (OUTPATIENT)
Dept: OTHER | Facility: MEDICAL CENTER | Age: 75
End: 2020-08-21

## 2020-08-21 ENCOUNTER — PATIENT MESSAGE (OUTPATIENT)
Dept: HEALTH INFORMATION MANAGEMENT | Facility: OTHER | Age: 75
End: 2020-08-21

## 2020-08-21 NOTE — PROGRESS NOTES
ONN called patient to follow up on the referral.   Patient reports that he is home from the SNF and that their are some staff right now from the SNF checking on him.  ANGEL told the patient I would call a little later to talk.

## 2020-08-24 ENCOUNTER — HOSPITAL ENCOUNTER (OUTPATIENT)
Dept: LAB | Facility: MEDICAL CENTER | Age: 75
End: 2020-08-24
Attending: FAMILY MEDICINE
Payer: MEDICARE

## 2020-08-24 DIAGNOSIS — Z11.59 ENCOUNTER FOR SCREENING FOR OTHER VIRAL DISEASES: ICD-10-CM

## 2020-08-24 LAB
COVID ORDER STATUS COVID19: NORMAL
SARS-COV-2 RNA RESP QL NAA+PROBE: NOTDETECTED
SPECIMEN SOURCE: NORMAL

## 2020-08-24 PROCEDURE — U0003 INFECTIOUS AGENT DETECTION BY NUCLEIC ACID (DNA OR RNA); SEVERE ACUTE RESPIRATORY SYNDROME CORONAVIRUS 2 (SARS-COV-2) (CORONAVIRUS DISEASE [COVID-19]), AMPLIFIED PROBE TECHNIQUE, MAKING USE OF HIGH THROUGHPUT TECHNOLOGIES AS DESCRIBED BY CMS-2020-01-R: HCPCS

## 2020-08-24 PROCEDURE — C9803 HOPD COVID-19 SPEC COLLECT: HCPCS

## 2020-08-24 NOTE — PROGRESS NOTES
Subjective:   8/25/2020  2:26 PM  Primary care physician:Vasu Zamudio M.D.  Referring Provider: Diego Youssef MD  Medical Oncologist: Dr. Vaughan    Chief Complaint:   Chief Complaint   Patient presents with   • Follow-Up     PO/STAPLES DISTAL PANC PANCREATIC CA      Diagnosis:   1. 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 Abdominal pain, unspecified abdominal location     3. Abnormal CT of the abdomen         History of presenting illness:  Martin Briceño  is a pleasant 75 y.o. year old male who presented with postop visit status post distal pancreatectomy and splenectomy.  Patient was found to have margins negative and all nodes negative but he did have perineural invasion the tumor was greater than 3 cm.  He was seen by Dr. Vaughan and he has an appointment with him tomorrow.  He asked me about my recommendation regarding chemotherapy.  I recommended chemotherapy because of the perineural invasion and the size of the tumor.  In any event, he denies any fever or chills, nausea or vomiting but he does have significant diarrhea and is being ruled out for C. difficile colitis.  We went over his pathology again.      Past Medical History:   Diagnosis Date   • Adverse effect of anesthesia     Versed is not effective   • Anemia    • Anesthesia     didn't tolerate versed.   • Cancer (HCC) 08/11/2020    pancreatic   • Cataract     IOL OU   • Chronic pain     lower back   • GERD (gastroesophageal reflux disease)    • Heart burn    • High cholesterol    • Hypertension    • Indigestion    • Pancreatic mass    • Post-nasal drip    • Sjogren's syndrome (HCC)    • Stroke (HCC) 2007    3 tia's per pt   • Unspecified disorder of thyroid     3/4 of the thyroid removed     Past Surgical History:   Procedure Laterality Date   • PANCREATECTOMY  8/13/2020    Procedure: PANCREATECTOMY-DISTAL, OMENTAL FLAP, INTRA OPERATIVE ULTRA SOUND, GASTRIC WEDGE RESECTION;  Surgeon: Dagoberto Gann M.D.;  Location: [facility];  Service: General   • SPLENECTOMY  8/13/2020    Procedure: SPLENECTOMY;  Surgeon: Dagoberto Gann M.D.;  Location: [facility];  Service: General   • PB ENDOSCOPIC US EXAM, ESOPH  8/5/2020    Procedure: EGD, WITH ENDOSCOPIC US - UPPER CURVILINEAR;  Surgeon: Diego Youssef M.D.;  Location: [facility];  Service: Gastroenterology   • GASTROSCOPY-ENDO  8/5/2020    Procedure: GASTROSCOPY - WITH GASTRIC AND DUODENAL BIOPSIES;  Surgeon: Diego Youssef M.D.;  Location: [facility];  Service: Gastroenterology   • EGD WITH ASP/BX  8/5/2020    Procedure: EGD, WITH ASPIRATION BIOPSY - PANCREAS;  Surgeon: Diego Youssef M.D.;  Location: [facility];  Service: Gastroenterology   • PB SHLDR ARTHROSCOP,SURG,W/ROTAT CUFF REPB Left 6/1/2020    Procedure: ARTHROSCOPY, SHOULDER, WITH ROTATOR CUFF REPAIR; EXTENSIVE DEBRIDEMENT; SUBSCAPULARIS REPAIR;  Surgeon: Newton Perea M.D.;  Location: [facility];  Service: Orthopedics   • PB SHLDR ARTHROSCOP,PART ACROMIOPLAS Left 6/1/2020    Procedure: DECOMPRESSION, SHOULDER, ARTHROSCOPIC - SUBACROMIAL;  Surgeon: Newton Perea M.D.;  Location: [facility];  Service: Orthopedics   • INGUINAL HERNIA LAPAROSCOPIC Left 2016   • SHOULDER ARTHROSCOPY Right 2015    RCR   • GASTROSCOPY WITH BIOPSY  6/11/08    Performed by JAYNE RIZO at [facility]   • EGD W/ENDOSCOPIC ULTRASOUND  6/11/08    Performed by JAYNE RIZO at [facility]   • THYROIDECTOMY  2003    Partial   • APPENDECTOMY  1976   • HERNIA REPAIR     • OTHER ABDOMINAL SURGERY      appendectomy 1976     Allergies   Allergen Reactions   • Mirtazapine Unspecified     Psychological issues   • Norco [Hydrocodone-Acetaminophen] Shortness of Breath and Swelling   • Tape Itching     Outpatient Encounter Medications as of 8/25/2020   Medication Sig Dispense Refill   • acetaminophen (TYLENOL) 500 MG Tab  Take 2 Tabs by mouth every 6 hours as needed. 30 Tab 0   • celecoxib (CELEBREX) 200 MG Cap Take 1 Cap by mouth every day. 60 Cap    • enoxaparin (LOVENOX) 40 MG/0.4ML Solution inj Inject 40 mg as instructed every 24 hours.     • oxyCODONE immediate-release (ROXICODONE) 5 MG Tab Take 1 Tab by mouth every four hours as needed for up to 10 days. 30 Tab 0   • polyethylene glycol/lytes (MIRALAX) 17 g Pack Take 1 Packet by mouth every day.  3   • promethazine (PHENERGAN) 12.5 MG tablet Take 1-2 Tabs by mouth every 6 hours as needed for Nausea/Vomiting. 30 Tab 0   • Light Mineral Oil-Mineral Oil (RETAINE MGD) 0.5-0.5 % Emulsion 1 Drop by Ophthalmic route.     • losartan (COZAAR) 50 MG Tab Take 50 mg by mouth every day.     • CEQUA 0.09 % Solution Place 1 Drop in both eyes 2 Times a Day.     • atorvastatin (LIPITOR) 10 MG Tab Take 10 mg by mouth every day. For 30 mg total     • temazepam (RESTORIL) 15 MG Cap Take 15 mg by mouth at bedtime as needed for Sleep.     • Turmeric 500 MG Cap Take  by mouth 2 Times a Day.     • hydroxychloroquine (PLAQUENIL) 200 MG Tab TAKE 1 TABLET BY MOUTH TWICE A  Tab 1   • zolpidem (AMBIEN) 10 MG Tab 2.5 mg.     • calcium carbonate (TUMS) 500 MG Chew Tab Take 1,000 mg by mouth every day.     • Non Formulary Request 2 Times a Day. HYDOEYE FOR DRY EYES      • azelastine (ASTELIN) 137 MCG/SPRAY nasal spray [place] 1 Spray in nose 2 times a day. (Patient taking differently: Spray 1 Spray in nose every evening.) 30 mL 2   • albuterol 108 (90 Base) MCG/ACT Aero Soln inhalation aerosol Inhale 2 Puffs by mouth every four hours as needed for Shortness of Breath. 1 Inhaler 2   • atorvastatin (LIPITOR) 20 MG Tab Take 1 Tab by mouth every day. 90 Tab 3   • levothyroxine (SYNTHROID) 125 MCG Tab Take 1 Tab by mouth Every morning on an empty stomach. 90 Tab 3   • liothyronine (CYTOMEL) 5 MCG Tab Take 1 Tab by mouth every day. 90 Tab 3   • aspirin-dipyridamole (AGGRENOX)  MG CAPSULE SR 12 HR Take 1  Cap by mouth 2 times a day. 180 Cap 3   • omeprazole (PRILOSEC) 40 MG delayed-release capsule Take 1 Cap by mouth every day. (Patient taking differently: Take 40 mg by mouth 2 times a day.) 90 Cap 3   • cycloSPORINE, PF, (CEQUA) 0.09 % Solution 1 Drop by Ophthalmic route.     • Ascorbic Acid (VITAMIN C) 1000 MG Tab Take 1,000 mg by mouth.     • Lifitegrast (XIIDRA) 5 % Solution 1 Drop 2 Times a Day.     • fluticasone (FLONASE) 50 MCG/ACT nasal spray Spray 1 Spray in nose 2 times a day.     • tadalafil (CIALIS) 10 MG tablet Take 10 mg by mouth as needed for Erectile Dysfunction.     • Bepotastine Besilate (BEPREVE) 1.5 % Solution by Ophthalmic route. Seasonal eye drops for dry eyes     • Cyanocobalamin (B-12) 1000 MCG Cap Take  by mouth.     • cholecalciferol (D-3-5) 5000 UNIT Cap Take 6,000 Units by mouth every day.     • Coenzyme Q10 (COQ10) 400 MG Cap Take  by mouth 2 Times a Day.       No facility-administered encounter medications on file as of 2020.      Social History     Socioeconomic History   • Marital status:      Spouse name: Not on file   • Number of children: Not on file   • Years of education: Not on file   • Highest education level: Not on file   Occupational History   • Not on file   Social Needs   • Financial resource strain: Not on file   • Food insecurity     Worry: Not on file     Inability: Not on file   • Transportation needs     Medical: Not on file     Non-medical: Not on file   Tobacco Use   • Smoking status: Former Smoker     Packs/day: 1.00     Years: 30.00     Pack years: 30.00     Types: Cigarettes     Start date:      Quit date:      Years since quittin.6   • Smokeless tobacco: Never Used   Substance and Sexual Activity   • Alcohol use: Not Currently     Frequency: Never   • Drug use: Yes     Types: Marijuana, Inhaled     Comment: estela for sleep/ THC/CBD   • Sexual activity: Not Currently   Lifestyle   • Physical activity     Days per week: Not on file      "Minutes per session: Not on file   • Stress: Not on file   Relationships   • Social connections     Talks on phone: Not on file     Gets together: Not on file     Attends Confucianism service: Not on file     Active member of club or organization: Not on file     Attends meetings of clubs or organizations: Not on file     Relationship status: Not on file   • Intimate partner violence     Fear of current or ex partner: Not on file     Emotionally abused: Not on file     Physically abused: Not on file     Forced sexual activity: Not on file   Other Topics Concern   • Not on file   Social History Narrative   • Not on file      Social History     Tobacco Use   Smoking Status Former Smoker   • Packs/day: 1.00   • Years: 30.00   • Pack years: 30.00   • Types: Cigarettes   • Start date:    • Quit date:    • Years since quittin.6   Smokeless Tobacco Never Used     Social History     Substance and Sexual Activity   Alcohol Use Not Currently   • Frequency: Never     Social History     Substance and Sexual Activity   Drug Use Yes   • Types: Marijuana, Inhaled    Comment: estela for sleep/ THC/CBD        Family History   Problem Relation Age of Onset   • COPD Mother    • Heart Disease Father         A. Fib       Review of Systems   Gastrointestinal: Positive for diarrhea.   All other systems reviewed and are negative.       Objective:   /64 (BP Location: Right arm, Patient Position: Sitting, BP Cuff Size: Adult)   Pulse 68   Temp 37.2 °C (99 °F) (Temporal)   Ht 1.791 m (5' 10.5\")   Wt 69.9 kg (154 lb)   SpO2 96%   BMI 21.78 kg/m²     Physical Exam   Abdominal: Soft. Bowel sounds are normal.   Incision is clean, dry, and intact with a small amount of ecchymoses.  No pain.   Nursing note and vitals reviewed.      Labs:  Results for RY MALCOLM (MRN [id number]) as of 2020 15:42   Ref. Range 2020 05:10   WBC Latest Ref Range: 4.8 - 10.8 K/uL 12.1 (H)   RBC Latest Ref Range: 4.70 - 6.10 " M/uL 3.18 (L)   Hemoglobin Latest Ref Range: 14.0 - 18.0 g/dL 11.2 (L)   Hematocrit Latest Ref Range: 42.0 - 52.0 % 31.8 (L)   MCV Latest Ref Range: 81.4 - 97.8 fL 100.0 (H)   MCH Latest Ref Range: 27.0 - 33.0 pg 35.2 (H)   MCHC Latest Ref Range: 33.7 - 35.3 g/dL 35.2   RDW Latest Ref Range: 35.9 - 50.0 fL 43.1   Platelet Count Latest Ref Range: 164 - 446 K/uL 130 (L)   MPV Latest Ref Range: 9.0 - 12.9 fL 9.0   Sodium Latest Ref Range: 135 - 145 mmol/L 135   Potassium Latest Ref Range: 3.6 - 5.5 mmol/L 4.1   Chloride Latest Ref Range: 96 - 112 mmol/L 96   Co2 Latest Ref Range: 20 - 33 mmol/L 27   Anion Gap Latest Ref Range: 7.0 - 16.0  12.0   Glucose Latest Ref Range: 65 - 99 mg/dL 97   Bun Latest Ref Range: 8 - 22 mg/dL 9   Creatinine Latest Ref Range: 0.50 - 1.40 mg/dL 0.76   GFR If  Latest Ref Range: >60 mL/min/1.73 m 2 >60   GFR If Non  Latest Ref Range: >60 mL/min/1.73 m 2 >60   Calcium Latest Ref Range: 8.5 - 10.5 mg/dL 8.7   AST(SGOT) Latest Ref Range: 12 - 45 U/L 56 (H)   ALT(SGPT) Latest Ref Range: 2 - 50 U/L 21   Alkaline Phosphatase Latest Ref Range: 30 - 99 U/L 49   Total Bilirubin Latest Ref Range: 0.1 - 1.5 mg/dL 0.9   Albumin Latest Ref Range: 3.2 - 4.9 g/dL 3.5   Total Protein Latest Ref Range: 6.0 - 8.2 g/dL 6.1   Globulin Latest Ref Range: 1.9 - 3.5 g/dL 2.6   A-G Ratio Latest Units: g/dL 1.3       Imagin20 MRI   Per my read,     IMPRESSION:        1.  Hypoenhancing irregular mass in the pancreatic tail may have enlarged from the prior CT. Findings are nonspecific. Possible etiologies include a neuroendocrine tumor or other neoplasm. Focal chronic pancreatitis could have a similar appearance.     2.  Nonspecific thickened gastric folds are seen as on the prior CT, possibly related to patient's history of Sjogren's syndrome.     3.  Complex cyst in the mid to lower left kidney, Bosniak 2. No additional follow-up is recommended.       Pathology:  8/13/20    FINAL DIAGNOSIS:     A. Distal pancreas spleen:          Invasive pancreatic ductal carcinoma, 3 cm, with extension into           peripancreatic soft tissues.          Margins clear.          Lymphovascular invasion not identified.          Perineural invasion present.          Three benign lymph nodes identified (0/3).          Unremarkable spleen.   B. Gastric wedge antrum:          Portion of muscularis propria and serosa with no significant           abnormalities.          No evidence of malignancy.     Synoptic Report for Pancreas (Exocrine):          -Procedure: distal pancreatectomy, splenectomy.        -Tumor Site: Distal pancreas        -Tumor Size: 3 cm        -Histologic Type: Invasive ductal adenocarcinoma        -Histologic Grade: G2, moderately differentiated        -Tumor Extension: tumor extends into peripancreatic soft tissues        -Margins: Negative         Distance of invasive carcinoma from closest margin: 3.5 cm          (pancreatic margin)        -Treatment Effect: No known presurgical therapy.        -Lymph-vascular Invasion: Not identified.        -Perineural Invasion: Present.        -Regional Lymph Nodes: negative           Number of lymph nodes involved: 0           Number of lymph nodes examined: 3        -Pathologic Staging:         Primary Tumor: pT2         Regional Lymph Nodes: pN0         Distant Metastasis: Not applicable.        -Additional Pathologic Findings: N/A     Procedures: 8/13/20     Distal pancreatectomy, splenectomy with lymph nodes     Diagnosis:     1. 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 Abdominal pain, unspecified abdominal location     3. Abnormal CT of the abdomen             Medical Decision Making:  Today's Assessment / Status / Plan:     Findings, the patient will follow-up with his medical oncologist.  If the patient is in need of a PowerPort, we will place it for him.  We discussed the risks and benefits of a PowerPort including  bleeding, infection, not being able to thread the catheter, 1% risk of pneumothorax and infection.  He will contact me if he needs a PowerPort.    I, Dr. Gann have entered, reviewed and confirmed the above diagnoses related to this patient on this date of service, 8/25/2020  2:26 PM.    He agreed and verbalized his agreement and understanding with the current plan. I answered all questions and concerns he has at this time and advised him to call at any time in the interim with questions or concerns in regards to his care.    Thank you for allowing me to participate in his care, I will continue to follow closely.       Please note that this dictation was created using voice recognition software. I have made every reasonable attempt to correct obvious errors, but I expect that there are errors of grammar and possibly content that I did not discover before finalizing the note.     Thank you for this consultation and allowing me to participate in your patient's care. If I can be of further service please contact my office.

## 2020-08-25 ENCOUNTER — OFFICE VISIT (OUTPATIENT)
Dept: SURGICAL ONCOLOGY | Facility: MEDICAL CENTER | Age: 75
End: 2020-08-25
Payer: MEDICARE

## 2020-08-25 ENCOUNTER — HOSPITAL ENCOUNTER (OUTPATIENT)
Facility: MEDICAL CENTER | Age: 75
End: 2020-08-25
Attending: FAMILY MEDICINE
Payer: MEDICARE

## 2020-08-25 VITALS
DIASTOLIC BLOOD PRESSURE: 64 MMHG | BODY MASS INDEX: 21.56 KG/M2 | WEIGHT: 154 LBS | TEMPERATURE: 99 F | SYSTOLIC BLOOD PRESSURE: 112 MMHG | OXYGEN SATURATION: 96 % | HEART RATE: 68 BPM | HEIGHT: 71 IN

## 2020-08-25 DIAGNOSIS — R93.5 ABNORMAL CT OF THE ABDOMEN: ICD-10-CM

## 2020-08-25 DIAGNOSIS — C25.2 MALIGNANT NEOPLASM OF TAIL OF PANCREAS (HCC): ICD-10-CM

## 2020-08-25 DIAGNOSIS — R10.9 ABDOMINAL PAIN, UNSPECIFIED ABDOMINAL LOCATION: ICD-10-CM

## 2020-08-25 DIAGNOSIS — R19.7 DIARRHEA, UNSPECIFIED TYPE: ICD-10-CM

## 2020-08-25 LAB
C DIFF DNA SPEC QL NAA+PROBE: NEGATIVE
C DIFF TOX GENS STL QL NAA+PROBE: NEGATIVE

## 2020-08-25 PROCEDURE — 99024 POSTOP FOLLOW-UP VISIT: CPT | Performed by: SURGERY

## 2020-08-25 PROCEDURE — 87493 C DIFF AMPLIFIED PROBE: CPT

## 2020-08-25 ASSESSMENT — ENCOUNTER SYMPTOMS: DIARRHEA: 1

## 2020-08-25 ASSESSMENT — FIBROSIS 4 INDEX: FIB4 SCORE: 6.86

## 2020-08-26 ENCOUNTER — HOSPITAL ENCOUNTER (OUTPATIENT)
Dept: LAB | Facility: MEDICAL CENTER | Age: 75
End: 2020-08-26
Attending: INTERNAL MEDICINE
Payer: MEDICARE

## 2020-08-26 ENCOUNTER — OFFICE VISIT (OUTPATIENT)
Dept: MEDICAL GROUP | Facility: MEDICAL CENTER | Age: 75
End: 2020-08-26
Payer: MEDICARE

## 2020-08-26 ENCOUNTER — PATIENT MESSAGE (OUTPATIENT)
Dept: MEDICAL GROUP | Facility: MEDICAL CENTER | Age: 75
End: 2020-08-26

## 2020-08-26 VITALS
OXYGEN SATURATION: 95 % | BODY MASS INDEX: 21.28 KG/M2 | HEART RATE: 69 BPM | WEIGHT: 152 LBS | HEIGHT: 71 IN | DIASTOLIC BLOOD PRESSURE: 60 MMHG | SYSTOLIC BLOOD PRESSURE: 98 MMHG | TEMPERATURE: 98.4 F

## 2020-08-26 DIAGNOSIS — Z90.81 S/P SPLENECTOMY: ICD-10-CM

## 2020-08-26 DIAGNOSIS — K86.89 PANCREATIC INSUFFICIENCY: ICD-10-CM

## 2020-08-26 DIAGNOSIS — I10 ESSENTIAL HYPERTENSION: ICD-10-CM

## 2020-08-26 DIAGNOSIS — Z23 NEED FOR VACCINATION: ICD-10-CM

## 2020-08-26 DIAGNOSIS — C25.2 MALIGNANT NEOPLASM OF TAIL OF PANCREAS (HCC): ICD-10-CM

## 2020-08-26 LAB
ALBUMIN SERPL BCP-MCNC: 4.7 G/DL (ref 3.2–4.9)
ALBUMIN/GLOB SERPL: 1.7 G/DL
ALP SERPL-CCNC: 66 U/L (ref 30–99)
ALT SERPL-CCNC: 20 U/L (ref 2–50)
ANION GAP SERPL CALC-SCNC: 15 MMOL/L (ref 7–16)
AST SERPL-CCNC: 34 U/L (ref 12–45)
BILIRUB SERPL-MCNC: 0.3 MG/DL (ref 0.1–1.5)
BUN SERPL-MCNC: 12 MG/DL (ref 8–22)
CALCIUM SERPL-MCNC: 9.6 MG/DL (ref 8.5–10.5)
CHLORIDE SERPL-SCNC: 94 MMOL/L (ref 96–112)
CO2 SERPL-SCNC: 27 MMOL/L (ref 20–33)
CREAT SERPL-MCNC: 0.93 MG/DL (ref 0.5–1.4)
GLOBULIN SER CALC-MCNC: 2.7 G/DL (ref 1.9–3.5)
GLUCOSE SERPL-MCNC: 98 MG/DL (ref 65–99)
POTASSIUM SERPL-SCNC: 4.4 MMOL/L (ref 3.6–5.5)
PROT SERPL-MCNC: 7.4 G/DL (ref 6–8.2)
SODIUM SERPL-SCNC: 136 MMOL/L (ref 135–145)

## 2020-08-26 PROCEDURE — 90648 HIB PRP-T VACCINE 4 DOSE IM: CPT | Performed by: FAMILY MEDICINE

## 2020-08-26 PROCEDURE — 90472 IMMUNIZATION ADMIN EACH ADD: CPT | Performed by: FAMILY MEDICINE

## 2020-08-26 PROCEDURE — 99214 OFFICE O/P EST MOD 30 MIN: CPT | Mod: 25 | Performed by: FAMILY MEDICINE

## 2020-08-26 PROCEDURE — 90734 MENACWYD/MENACWYCRM VACC IM: CPT | Performed by: FAMILY MEDICINE

## 2020-08-26 PROCEDURE — 80053 COMPREHEN METABOLIC PANEL: CPT

## 2020-08-26 PROCEDURE — 90621 MENB-FHBP VACC 2/3 DOSE IM: CPT | Performed by: FAMILY MEDICINE

## 2020-08-26 PROCEDURE — G0009 ADMIN PNEUMOCOCCAL VACCINE: HCPCS | Performed by: FAMILY MEDICINE

## 2020-08-26 PROCEDURE — 86301 IMMUNOASSAY TUMOR CA 19-9: CPT

## 2020-08-26 PROCEDURE — 90732 PPSV23 VACC 2 YRS+ SUBQ/IM: CPT | Performed by: FAMILY MEDICINE

## 2020-08-26 RX ORDER — PANCRELIPASE 60000; 12000; 38000 [USP'U]/1; [USP'U]/1; [USP'U]/1
1 CAPSULE, DELAYED RELEASE PELLETS ORAL
Qty: 90 CAP | Refills: 1 | Status: SHIPPED
Start: 2020-08-26 | End: 2020-09-02

## 2020-08-26 ASSESSMENT — FIBROSIS 4 INDEX: FIB4 SCORE: 6.86

## 2020-08-26 NOTE — PROGRESS NOTES
Subjective:   Martin Briceño is a 75 y.o. 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  Unfortunately malignancy was recognized at [facility] and patient has appointment with oncology to review potential chemotherapy treatments.  Has been having diarrhea with just about everything he eats. He is having clear liquid about 6 times this morning. He is trying to force down food because he is seeing weight loss.  C. Diff was negative.    Essential hypertension  He is off losartan 50 mg daily for the last 2 weeks.         Current medicines (including changes today)  Current Outpatient Medications   Medication Sig Dispense Refill   • Pancrelipase, Lip-Prot-Amyl, (CREON) 95033 units Cap DR Particles Take 1 Cap by mouth 3 times a day, with meals. 90 Cap 1   • Light Mineral Oil-Mineral Oil (RETAINE MGD) 0.5-0.5 % Emulsion 1 Drop by Ophthalmic route.     • CEQUA 0.09 % Solution Place 1 Drop in both eyes 2 Times a Day.     • atorvastatin (LIPITOR) 10 MG Tab Take 10 mg by mouth every day. For 30 mg total     • temazepam (RESTORIL) 15 MG Cap Take 15 mg by mouth at bedtime as needed for Sleep.     • Turmeric 500 MG Cap Take  by mouth 2 Times a Day.     • hydroxychloroquine (PLAQUENIL) 200 MG Tab TAKE 1 TABLET BY MOUTH TWICE A  Tab 1   • zolpidem (AMBIEN) 10 MG Tab 2.5 mg.     • calcium carbonate (TUMS) 500 MG Chew Tab Take 1,000 mg by mouth every day.     • Non Formulary Request 2 Times a Day. HYDOEYE FOR DRY EYES      • albuterol 108 (90 Base) MCG/ACT Aero Soln inhalation aerosol Inhale 2 Puffs by mouth every four hours as needed for Shortness of Breath. 1 Inhaler 2   • atorvastatin (LIPITOR) 20 MG Tab Take 1 Tab by mouth every day. 90 Tab 3   • levothyroxine (SYNTHROID) 125 MCG Tab Take 1 Tab by mouth Every morning on an empty stomach. 90 Tab 3   • liothyronine (CYTOMEL) 5 MCG Tab Take 1 Tab by mouth every day.  "90 Tab 3   • aspirin-dipyridamole (AGGRENOX)  MG CAPSULE SR 12 HR Take 1 Cap by mouth 2 times a day. 180 Cap 3   • omeprazole (PRILOSEC) 40 MG delayed-release capsule Take 1 Cap by mouth every day. (Patient taking differently: Take 40 mg by mouth 2 times a day.) 90 Cap 3   • Ascorbic Acid (VITAMIN C) 1000 MG Tab Take 1,000 mg by mouth.     • Lifitegrast (XIIDRA) 5 % Solution 1 Drop 2 Times a Day.     • fluticasone (FLONASE) 50 MCG/ACT nasal spray Spray 1 Spray in nose 2 times a day.     • tadalafil (CIALIS) 10 MG tablet Take 10 mg by mouth as needed for Erectile Dysfunction.     • Bepotastine Besilate (BEPREVE) 1.5 % Solution by Ophthalmic route. Seasonal eye drops for dry eyes     • Cyanocobalamin (B-12) 1000 MCG Cap Take  by mouth.     • cholecalciferol (D-3-5) 5000 UNIT Cap Take 6,000 Units by mouth every day.     • Coenzyme Q10 (COQ10) 400 MG Cap Take  by mouth 2 Times a Day.       No current facility-administered medications for this visit.      He  has a past medical history of Adverse effect of anesthesia, Anemia, Anesthesia, Cancer ([facility]) (08/11/2020), Cataract, Chronic pain, GERD (gastroesophageal reflux disease), Heart burn, High cholesterol, Hypertension, Indigestion, Pancreatic mass, Post-nasal drip, Sjogren's syndrome ([facility]), Stroke ([facility]) (2007), and Unspecified disorder of thyroid. He also has no past medical history of Arthritis.    ROS   No fever, positive weakness, positive headache       Objective:     BP (!) 98/60   Pulse 69   Temp 36.9 °C (98.4 °F) (Temporal)   Ht 1.791 m (5' 10.5\")   Wt 68.9 kg (152 lb)   SpO2 95%  Body mass index is 21.5 kg/m².   Physical Exam:  Constitutional: Alert, no distress.  Skin: Warm, dry, good turgor, no rashes in visible areas.  Eye: Equal, round and reactive, conjunctiva clear, lids normal  Psych: Alert and oriented x3, normal affect and mood.        Assessment and Plan:   The following treatment plan was discussed    1. Pancreatic insufficiency  Possible " pancreatic insufficiency we will do a trial of Creon.  Referral to GI.  - Pancrelipase, Lip-Prot-Amyl, (CREON) 71740 units Cap DR Particles; Take 1 Cap by mouth 3 times a day, with meals.  Dispense: 90 Cap; Refill: 1    2. 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  Immunizations updated today.  He will be due for meningitis A and B booster in 8 weeks.  - Pancrelipase, Lip-Prot-Amyl, (CREON) 28251 units Cap DR Particles; Take 1 Cap by mouth 3 times a day, with meals.  Dispense: 90 Cap; Refill: 1    3. Essential hypertension  Low blood pressure despite being off medication.  Advised patient to go to the ER for IV hydration.    4. Need for vaccination  - Pneumococal Polysaccharide Vaccine 23-Valent =>3YO SQ/IM  - Meningococcal Conjugate Vaccine 4-Valent IM (Menactra)  - Meningococcal Vaccine Serogroup B 2-3 Dose IM  - HIB PRP-OMP Conjugate Vaccine 3-Dose IM    5. S/P splenectomy  - Pneumococal Polysaccharide Vaccine 23-Valent =>3YO SQ/IM  - Meningococcal Conjugate Vaccine 4-Valent IM (Menactra)  - Meningococcal Vaccine Serogroup B 2-3 Dose IM  - HIB PRP-OMP Conjugate Vaccine 3-Dose IM      Followup: Return in about 2 months (around 10/26/2020), or if symptoms worsen or fail to improve, for immunizations.

## 2020-08-26 NOTE — ASSESSMENT & PLAN NOTE
Patient had pancreatic tail resection 13 days ago for malignancy.  Unfortunately malignancy was recognized at [facility] and patient has appointment with oncology to review potential chemotherapy treatments.  Has been having diarrhea with just about everything he eats. He is having clear liquid about 6 times this morning. He is trying to force down food because he is seeing weight loss.  C. Diff was negative.

## 2020-08-27 ENCOUNTER — PATIENT OUTREACH (OUTPATIENT)
Dept: OTHER | Facility: MEDICAL CENTER | Age: 75
End: 2020-08-27

## 2020-08-27 LAB — CANCER AG19-9 SERPL-ACNC: 39 U/ML (ref 0–35)

## 2020-08-27 NOTE — PROGRESS NOTES
Late entry- ONN reached out to the patient on 08/21/2020.  Patient reports being home from the SNF and is healing well.  He reports that he received my letter ia my chart.  Onn followed up and elaborated on my role and services offered. Patient reports that he has a post op appointment with Dr. FARIAS to discuss his pathology results.  Patient reported that he may be seeking a second opinion.  ONN listened and provided support encouraged patient to get a second opinion if that is what he desires.  ONN will follow.

## 2020-08-27 NOTE — PROGRESS NOTES
8/25/2020  2:26 PM  Primary care physician:Vasu Zamudio M.D.  Referring Provider: Diego Youssef MD  Medical Oncologist: Dr. Vaughan    Chief Complaint:   Chief Complaint   Patient presents with   • Follow-Up     PO/STAPLES DISTAL PANC PANCREATIC CA      Diagnosis:   1. 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 Abdominal pain, unspecified abdominal location     3. Abnormal CT of the abdomen         History of presenting illness:  Martin Briceño  is a pleasant 75 y.o. year old male who presented with postop visit status post distal pancreatectomy and splenectomy.  Patient was found to have margins negative and all nodes negative but he did have perineural invasion the tumor was greater than 3 cm.  He was seen by Dr. Vaughan and he has an appointment with him tomorrow.  He asked me about my recommendation regarding chemotherapy.  I recommended chemotherapy because of the perineural invasion and the size of the tumor.  In any event, he denies any fever or chills, nausea or vomiting but he does have significant diarrhea and is being ruled out for C. difficile colitis.  We went over his pathology again.      Past Medical History:   Diagnosis Date   • Adverse effect of anesthesia     Versed is not effective   • Anemia    • Anesthesia     didn't tolerate versed.   • Cancer (HCC) 08/11/2020    pancreatic   • Cataract     IOL OU   • Chronic pain     lower back   • GERD (gastroesophageal reflux disease)    • Heart burn    • High cholesterol    • Hypertension    • Indigestion    • Pancreatic mass    • Post-nasal drip    • Sjogren's syndrome (HCC)    • Stroke (HCC) 2007    3 tia's per pt   • Unspecified disorder of thyroid     3/4 of the thyroid removed     Past Surgical History:   Procedure Laterality Date   • PANCREATECTOMY  8/13/2020    Procedure: PANCREATECTOMY-DISTAL, OMENTAL FLAP, INTRA OPERATIVE ULTRA SOUND, GASTRIC WEDGE RESECTION;  Surgeon: Dagoberto Gann M.D.;  Location: SURGERY [facility];   Service: General   • SPLENECTOMY  8/13/2020    Procedure: SPLENECTOMY;  Surgeon: Dagoberto Gann M.D.;  Location: [facility];  Service: General   • PB ENDOSCOPIC US EXAM, ESOPH  8/5/2020    Procedure: EGD, WITH ENDOSCOPIC US - UPPER CURVILINEAR;  Surgeon: Diego Youssef M.D.;  Location: [facility];  Service: Gastroenterology   • GASTROSCOPY-ENDO  8/5/2020    Procedure: GASTROSCOPY - WITH GASTRIC AND DUODENAL BIOPSIES;  Surgeon: Diego Youssef M.D.;  Location: [facility];  Service: Gastroenterology   • EGD WITH ASP/BX  8/5/2020    Procedure: EGD, WITH ASPIRATION BIOPSY - PANCREAS;  Surgeon: Diego Youssef M.D.;  Location: [facility];  Service: Gastroenterology   • PB SHLDR ARTHROSCOP,SURG,W/ROTAT CUFF REPB Left 6/1/2020    Procedure: ARTHROSCOPY, SHOULDER, WITH ROTATOR CUFF REPAIR; EXTENSIVE DEBRIDEMENT; SUBSCAPULARIS REPAIR;  Surgeon: Newton Perea M.D.;  Location: [facility];  Service: Orthopedics   • PB SHLDR ARTHROSCOP,PART ACROMIOPLAS Left 6/1/2020    Procedure: DECOMPRESSION, SHOULDER, ARTHROSCOPIC - SUBACROMIAL;  Surgeon: Newton Perea M.D.;  Location: [facility];  Service: Orthopedics   • INGUINAL HERNIA LAPAROSCOPIC Left 2016   • SHOULDER ARTHROSCOPY Right 2015    RCR   • GASTROSCOPY WITH BIOPSY  6/11/08    Performed by JAYNE RIZO at [facility]   • EGD W/ENDOSCOPIC ULTRASOUND  6/11/08    Performed by JAYNE RIZO at [facility]   • THYROIDECTOMY  2003    Partial   • APPENDECTOMY  1976   • HERNIA REPAIR     • OTHER ABDOMINAL SURGERY      appendectomy 1976     Allergies   Allergen Reactions   • Mirtazapine Unspecified     Psychological issues   • Norco [Hydrocodone-Acetaminophen] Shortness of Breath and Swelling   • Tape Itching     Outpatient Encounter Medications as of 8/25/2020   Medication Sig Dispense Refill   • acetaminophen (TYLENOL) 500 MG Tab Take 2 Tabs  by mouth every 6 hours as needed. 30 Tab 0   • celecoxib (CELEBREX) 200 MG Cap Take 1 Cap by mouth every day. 60 Cap    • enoxaparin (LOVENOX) 40 MG/0.4ML Solution inj Inject 40 mg as instructed every 24 hours.     • oxyCODONE immediate-release (ROXICODONE) 5 MG Tab Take 1 Tab by mouth every four hours as needed for up to 10 days. 30 Tab 0   • polyethylene glycol/lytes (MIRALAX) 17 g Pack Take 1 Packet by mouth every day.  3   • promethazine (PHENERGAN) 12.5 MG tablet Take 1-2 Tabs by mouth every 6 hours as needed for Nausea/Vomiting. 30 Tab 0   • Light Mineral Oil-Mineral Oil (RETAINE MGD) 0.5-0.5 % Emulsion 1 Drop by Ophthalmic route.     • losartan (COZAAR) 50 MG Tab Take 50 mg by mouth every day.     • CEQUA 0.09 % Solution Place 1 Drop in both eyes 2 Times a Day.     • atorvastatin (LIPITOR) 10 MG Tab Take 10 mg by mouth every day. For 30 mg total     • temazepam (RESTORIL) 15 MG Cap Take 15 mg by mouth at bedtime as needed for Sleep.     • Turmeric 500 MG Cap Take  by mouth 2 Times a Day.     • hydroxychloroquine (PLAQUENIL) 200 MG Tab TAKE 1 TABLET BY MOUTH TWICE A  Tab 1   • zolpidem (AMBIEN) 10 MG Tab 2.5 mg.     • calcium carbonate (TUMS) 500 MG Chew Tab Take 1,000 mg by mouth every day.     • Non Formulary Request 2 Times a Day. HYDOEYE FOR DRY EYES      • azelastine (ASTELIN) 137 MCG/SPRAY nasal spray [place] 1 Spray in nose 2 times a day. (Patient taking differently: Spray 1 Spray in nose every evening.) 30 mL 2   • albuterol 108 (90 Base) MCG/ACT Aero Soln inhalation aerosol Inhale 2 Puffs by mouth every four hours as needed for Shortness of Breath. 1 Inhaler 2   • atorvastatin (LIPITOR) 20 MG Tab Take 1 Tab by mouth every day. 90 Tab 3   • levothyroxine (SYNTHROID) 125 MCG Tab Take 1 Tab by mouth Every morning on an empty stomach. 90 Tab 3   • liothyronine (CYTOMEL) 5 MCG Tab Take 1 Tab by mouth every day. 90 Tab 3   • aspirin-dipyridamole (AGGRENOX)  MG CAPSULE SR 12 HR Take 1 Cap by mouth  2 times a day. 180 Cap 3   • omeprazole (PRILOSEC) 40 MG delayed-release capsule Take 1 Cap by mouth every day. (Patient taking differently: Take 40 mg by mouth 2 times a day.) 90 Cap 3   • cycloSPORINE, PF, (CEQUA) 0.09 % Solution 1 Drop by Ophthalmic route.     • Ascorbic Acid (VITAMIN C) 1000 MG Tab Take 1,000 mg by mouth.     • Lifitegrast (XIIDRA) 5 % Solution 1 Drop 2 Times a Day.     • fluticasone (FLONASE) 50 MCG/ACT nasal spray Spray 1 Spray in nose 2 times a day.     • tadalafil (CIALIS) 10 MG tablet Take 10 mg by mouth as needed for Erectile Dysfunction.     • Bepotastine Besilate (BEPREVE) 1.5 % Solution by Ophthalmic route. Seasonal eye drops for dry eyes     • Cyanocobalamin (B-12) 1000 MCG Cap Take  by mouth.     • cholecalciferol (D-3-5) 5000 UNIT Cap Take 6,000 Units by mouth every day.     • Coenzyme Q10 (COQ10) 400 MG Cap Take  by mouth 2 Times a Day.       No facility-administered encounter medications on file as of 2020.      Social History     Socioeconomic History   • Marital status:      Spouse name: Not on file   • Number of children: Not on file   • Years of education: Not on file   • Highest education level: Not on file   Occupational History   • Not on file   Social Needs   • Financial resource strain: Not on file   • Food insecurity     Worry: Not on file     Inability: Not on file   • Transportation needs     Medical: Not on file     Non-medical: Not on file   Tobacco Use   • Smoking status: Former Smoker     Packs/day: 1.00     Years: 30.00     Pack years: 30.00     Types: Cigarettes     Start date:      Quit date:      Years since quittin.6   • Smokeless tobacco: Never Used   Substance and Sexual Activity   • Alcohol use: Not Currently     Frequency: Never   • Drug use: Yes     Types: Marijuana, Inhaled     Comment: estela for sleep/ THC/CBD   • Sexual activity: Not Currently   Lifestyle   • Physical activity     Days per week: Not on file     Minutes per  "session: Not on file   • Stress: Not on file   Relationships   • Social connections     Talks on phone: Not on file     Gets together: Not on file     Attends Religion service: Not on file     Active member of club or organization: Not on file     Attends meetings of clubs or organizations: Not on file     Relationship status: Not on file   • Intimate partner violence     Fear of current or ex partner: Not on file     Emotionally abused: Not on file     Physically abused: Not on file     Forced sexual activity: Not on file   Other Topics Concern   • Not on file   Social History Narrative   • Not on file      Social History     Tobacco Use   Smoking Status Former Smoker   • Packs/day: 1.00   • Years: 30.00   • Pack years: 30.00   • Types: Cigarettes   • Start date:    • Quit date:    • Years since quittin.6   Smokeless Tobacco Never Used     Social History     Substance and Sexual Activity   Alcohol Use Not Currently   • Frequency: Never     Social History     Substance and Sexual Activity   Drug Use Yes   • Types: Marijuana, Inhaled    Comment: estela for sleep/ THC/CBD        Family History   Problem Relation Age of Onset   • COPD Mother    • Heart Disease Father         A. Fib       Review of Systems   Gastrointestinal: Positive for diarrhea.   All other systems reviewed and are negative.       Objective:   /64 (BP Location: Right arm, Patient Position: Sitting, BP Cuff Size: Adult)   Pulse 68   Temp 37.2 °C (99 °F) (Temporal)   Ht 1.791 m (5' 10.5\")   Wt 69.9 kg (154 lb)   SpO2 96%   BMI 21.78 kg/m²     Physical Exam   Abdominal: Soft. Bowel sounds are normal.   Incision is clean, dry, and intact with a small amount of ecchymoses.  No pain.   Nursing note and vitals reviewed.      Labs:  Results for RY MALCOLM (MRN [id number]) as of 2020 15:42   Ref. Range 2020 05:10   WBC Latest Ref Range: 4.8 - 10.8 K/uL 12.1 (H)   RBC Latest Ref Range: 4.70 - 6.10 M/uL 3.18 (L) "   Hemoglobin Latest Ref Range: 14.0 - 18.0 g/dL 11.2 (L)   Hematocrit Latest Ref Range: 42.0 - 52.0 % 31.8 (L)   MCV Latest Ref Range: 81.4 - 97.8 fL 100.0 (H)   MCH Latest Ref Range: 27.0 - 33.0 pg 35.2 (H)   MCHC Latest Ref Range: 33.7 - 35.3 g/dL 35.2   RDW Latest Ref Range: 35.9 - 50.0 fL 43.1   Platelet Count Latest Ref Range: 164 - 446 K/uL 130 (L)   MPV Latest Ref Range: 9.0 - 12.9 fL 9.0   Sodium Latest Ref Range: 135 - 145 mmol/L 135   Potassium Latest Ref Range: 3.6 - 5.5 mmol/L 4.1   Chloride Latest Ref Range: 96 - 112 mmol/L 96   Co2 Latest Ref Range: 20 - 33 mmol/L 27   Anion Gap Latest Ref Range: 7.0 - 16.0  12.0   Glucose Latest Ref Range: 65 - 99 mg/dL 97   Bun Latest Ref Range: 8 - 22 mg/dL 9   Creatinine Latest Ref Range: 0.50 - 1.40 mg/dL 0.76   GFR If  Latest Ref Range: >60 mL/min/1.73 m 2 >60   GFR If Non  Latest Ref Range: >60 mL/min/1.73 m 2 >60   Calcium Latest Ref Range: 8.5 - 10.5 mg/dL 8.7   AST(SGOT) Latest Ref Range: 12 - 45 U/L 56 (H)   ALT(SGPT) Latest Ref Range: 2 - 50 U/L 21   Alkaline Phosphatase Latest Ref Range: 30 - 99 U/L 49   Total Bilirubin Latest Ref Range: 0.1 - 1.5 mg/dL 0.9   Albumin Latest Ref Range: 3.2 - 4.9 g/dL 3.5   Total Protein Latest Ref Range: 6.0 - 8.2 g/dL 6.1   Globulin Latest Ref Range: 1.9 - 3.5 g/dL 2.6   A-G Ratio Latest Units: g/dL 1.3       Imagin20 MRI   Per my read,     IMPRESSION:        1.  Hypoenhancing irregular mass in the pancreatic tail may have enlarged from the prior CT. Findings are nonspecific. Possible etiologies include a neuroendocrine tumor or other neoplasm. Focal chronic pancreatitis could have a similar appearance.     2.  Nonspecific thickened gastric folds are seen as on the prior CT, possibly related to patient's history of Sjogren's syndrome.     3.  Complex cyst in the mid to lower left kidney, Bosniak 2. No additional follow-up is recommended.       Pathology: 20    FINAL DIAGNOSIS:      A. Distal pancreas spleen:          Invasive pancreatic ductal carcinoma, 3 cm, with extension into           peripancreatic soft tissues.          Margins clear.          Lymphovascular invasion not identified.          Perineural invasion present.          Three benign lymph nodes identified (0/3).          Unremarkable spleen.   B. Gastric wedge antrum:          Portion of muscularis propria and serosa with no significant           abnormalities.          No evidence of malignancy.     Synoptic Report for Pancreas (Exocrine):          -Procedure: distal pancreatectomy, splenectomy.        -Tumor Site: Distal pancreas        -Tumor Size: 3 cm        -Histologic Type: Invasive ductal adenocarcinoma        -Histologic Grade: G2, moderately differentiated        -Tumor Extension: tumor extends into peripancreatic soft tissues        -Margins: Negative         Distance of invasive carcinoma from closest margin: 3.5 cm          (pancreatic margin)        -Treatment Effect: No known presurgical therapy.        -Lymph-vascular Invasion: Not identified.        -Perineural Invasion: Present.        -Regional Lymph Nodes: negative           Number of lymph nodes involved: 0           Number of lymph nodes examined: 3        -Pathologic Staging:         Primary Tumor: pT2         Regional Lymph Nodes: pN0         Distant Metastasis: Not applicable.        -Additional Pathologic Findings: N/A     Procedures: 8/13/20     Distal pancreatectomy, splenectomy with lymph nodes     Diagnosis:     1. 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 Abdominal pain, unspecified abdominal location     3. Abnormal CT of the abdomen             Medical Decision Making:  Today's Assessment / Status / Plan:     Findings, the patient will follow-up with his medical oncologist.  If the patient is in need of a PowerPort, we will place it for him.  We discussed the risks and benefits of a PowerPort including bleeding, infection, not  being able to thread the catheter, 1% risk of pneumothorax and infection.  He will contact me if he needs a PowerPort.    I, Dr. Gann have entered, reviewed and confirmed the above diagnoses related to this patient on this date of service, 8/25/2020  2:26 PM.    He agreed and verbalized his agreement and understanding with the current plan. I answered all questions and concerns he has at this time and advised him to call at any time in the interim with questions or concerns in regards to his care.    Thank you for allowing me to participate in his care, I will continue to follow closely.       Please note that this dictation was created using voice recognition software. I have made every reasonable attempt to correct obvious errors, but I expect that there are errors of grammar and possibly content that I did not discover before finalizing the note.     Thank you for this consultation and allowing me to participate in your patient's care. If I can be of further service please contact my office.

## 2020-08-27 NOTE — TELEPHONE ENCOUNTER
From: Martin Briceño  To: Vasu Zamudio M.D.  Sent: 8/26/2020 6:20 PM PDT  Subject: Non-Urgent Medical Question    Good evening Dr. Zamudio,   I want to 1st tell you how grateful I am for your focused care and attention to my health . Long story short, they have the enzyme at the [facility] [facility], and Cruzito and Mattie are going to get it for me this evening. Also, the [facility] people gave me a bag of fluid this afternoon, and I am scheduled to go back tomorrow and possibly Friday if I need more. So you can dish we guard the message you received earlier from your MA. I will be starting the more aggressive chemo on the 16th of September. It does not sound pleasant, but it has better survival odds than the less aggressive option. I will message you Friday as to the results of the enzyme. Thank you again, Tex

## 2020-08-27 NOTE — PROGRESS NOTES
Received an email from patient today sent (see below) to Oncology Nurse Navigator    Bay Franco,    I wanted to follow up with you after your kind introduction on the 21st.    I am going to begin chemo on the 16th with something I can't pronounce, but Dr. Vaughan says it is the most aggressive option.  About four days after my surgery I developed terrible diarrhea, which primarily consisted of clear whatever. (I lst about 8 pounds in the course of a week.)  Yesterday, when I went to see my primary to begin vaccinations (loss of spleen) my BP was 98/60. I was scheduled for my intake with Dr. Vaughan, and they started me on IV fluids (One more today and tomorrow likely.) My primary had me start a pancreatic enzyme (Creon) last night, and I think it is already helping.    The reason I am contacting you is nutrition.  I have been losing weight for months, and had to move to a gluten free/dairy free diet because of the digestive problems.  That hasn't helped the weight loss. Candidly, I am wondering how much of the GI problems I was experiencing were related to pancreatic cancer.    So I thought a nutritionist might be a good idea.  Particularly with chem just around the bend. I don't need someone telling me vegetables are an important part of your diet. I get that. I really want some help finding out what I can eat to add more nutrients and more weight to my body.    I have never worked with a nutritionist, so I am not sure what to expect.    Thank you very much,    Tex (Navarro)    Referral placed for Oncology Nutritionist and also this ONN provided education on Creon.  See email below.  Camille Mr. Briceño,  Thank you for reaching out weight loss is a very important topic for cancer patients.  I placed the referral for you to a nutritionist.  I am glad they started you on Creon it should help you a great deal.  When you take the Creon make sure you take it with your meal.  So take a pill eat a little take another pill etc…   The company that makes Creon provides us in-services and that is how they recommend taking the medication.  So if you are going to have a snack take a pill then eat your snack.  After a while you may find that the Creon may not be as effective which will probably mean you need to have a dose increase.  Our nutritionist is Spring unless it is her day off then it is Camila both are very knowledgeable and kind.  The chemotherapy most commonly used is below.  I’ll reach out to [facility] and get your treatment plan.    FOLFIRI is the name of a chemotherapy combination that includes:  • folinic acid (also called leucovorin, calcium folinate or FA)  • fluorouracil (also called 5FU)  • irinotecan.      If you need anything at all please reach out!    Best regards,    Joan Matt

## 2020-08-28 ENCOUNTER — PATIENT MESSAGE (OUTPATIENT)
Dept: MEDICAL GROUP | Facility: MEDICAL CENTER | Age: 75
End: 2020-08-28

## 2020-08-28 DIAGNOSIS — K86.89 PANCREATIC INSUFFICIENCY: ICD-10-CM

## 2020-08-28 DIAGNOSIS — C25.2 MALIGNANT NEOPLASM OF TAIL OF PANCREAS (HCC): ICD-10-CM

## 2020-08-31 ENCOUNTER — APPOINTMENT (OUTPATIENT)
Dept: ADMISSIONS | Facility: MEDICAL CENTER | Age: 75
End: 2020-08-31
Payer: MEDICARE

## 2020-08-31 ENCOUNTER — DOCUMENTATION (OUTPATIENT)
Dept: NUTRITION | Facility: MEDICAL CENTER | Age: 75
End: 2020-08-31

## 2020-08-31 ENCOUNTER — PATIENT OUTREACH (OUTPATIENT)
Dept: OTHER | Facility: MEDICAL CENTER | Age: 75
End: 2020-08-31

## 2020-08-31 ENCOUNTER — TELEPHONE (OUTPATIENT)
Dept: NUTRITION | Facility: MEDICAL CENTER | Age: 75
End: 2020-08-31

## 2020-08-31 ENCOUNTER — PRE-ADMISSION TESTING (OUTPATIENT)
Dept: ADMISSIONS | Facility: MEDICAL CENTER | Age: 75
End: 2020-08-31
Attending: SURGERY
Payer: MEDICARE

## 2020-08-31 DIAGNOSIS — Z01.812 PRE-OPERATIVE LABORATORY EXAMINATION: ICD-10-CM

## 2020-08-31 PROCEDURE — U0003 INFECTIOUS AGENT DETECTION BY NUCLEIC ACID (DNA OR RNA); SEVERE ACUTE RESPIRATORY SYNDROME CORONAVIRUS 2 (SARS-COV-2) (CORONAVIRUS DISEASE [COVID-19]), AMPLIFIED PROBE TECHNIQUE, MAKING USE OF HIGH THROUGHPUT TECHNOLOGIES AS DESCRIBED BY CMS-2020-01-R: HCPCS

## 2020-08-31 ASSESSMENT — FIBROSIS 4 INDEX: FIB4 SCORE: 4.27

## 2020-08-31 NOTE — PROGRESS NOTES
Oncology nurse navigator has received several emails from patient since my last note.  Mainly asking questions about his weight loss, diarrhea and meals. Nutritionist consult made still pending for the nutritionist to call the patient.  He has stopped on the Sepideh supplements and is just focusing on 6 meal/snacks a day and taking his creon with all of his meals.  He reports that he has and continues to lose weight.  ONN will continue to follow.  Patient is scheduled to have his port placed  this week.

## 2020-08-31 NOTE — TELEPHONE ENCOUNTER
Nutrition Services: Telephone Encounter: RD Consultation  75 year old male with diagnosis of malignant neoplasm of tail of pancreas    Past Medical History:   Diagnosis Date   • Adverse effect of anesthesia     Versed is not effective   • Anemia    • Anesthesia     didn't tolerate versed.   • Bowel habit changes     diarrhea   • Cancer (HCC) 08/11/2020    pancreatic   • Cataract     IOL OU   • Environmental and seasonal allergies    • GERD (gastroesophageal reflux disease)    • High cholesterol    • History of BPH    • Hypertension     reports history of, states MD stopped meds.   • Pancreatic mass    • Post-nasal drip    • Sjogren's syndrome (HCC)    • Stroke (HCC) 2007    3 tia's per pt   • Unspecified disorder of thyroid     3/4 of the thyroid removed     Pt received notification from Joan BAUMAN, stating that pt is having difficulties with diarrhea and taking in adequate nutrition. Pt discusses a lot nutritional concerns that he has been experiencing. Mentions PCP has prescribed creon, though is unsure what it is meant for and exactly how to take it. Wants to take supplements, such as Ensure Plus, [employer] 1.5, Liquid IV, and Sepideh vegan protein powder. Pt Mentions is able to eat nonfat yogurt, peanut butter, eggs, toast, and potatoes. States is avoiding dairy products and gluten. States did have issues on Friday and Saturday, some diarrhea. Mentions taking Creon on Wednesday and is taking one 1200 unit pill per meal. States diarrhea has remained mucous-like and clear. States did have some yellow stools, though this has decreased lately. Mentions no specific foods cause the diarrhea to worsen, to his knowledge. Relays recent surgery of the pancreas and spleen. States is also going to see a GI specialist to discuss GI transit time a bit more. Mentions he has folds in his stomach, that cause a slow transit time. States will receive chemo soon, is hopeful [facility] can [facility], though will know more later. Pt  "did not express any further nutrition-related questions or concerns at this time.     Assessment:  • Pertinent Labs: reviewed  • Pertinent Meds: Creon (93947 units, 1 per meal), tumeric, vitamin C, vitamin D, coenzyme Q10, synthroid, lipitor  • Weight: 156 lbs/ 70.9 kg on 8/31/20  • Height: 5'10\"  • BMI: 21.5  • WNL    Weight History from Chart:  166 lbs on 4/16/20  171 lbs on 1/14/20    Weight Change/Malnutrition Risk: Per chart review, pt has experienced potential 5 lb weight loss within past ~ 8 months. This is an insignificant loss, though worth noting.     Interventions:  • Phone conversation consisted of 45 minutes.   • RD discussed can enjoy Ensure Plus, Liquid IV, Claire [employer] Peptide 1.5, and Sepideh vegan protein powder as tolerated. Encouraged to continue to have whole foods as able. Advised to consume any protein shake very slowly to prevent GI distress.   • Reviewed role of pancreas and pancreatic enzymes. Discussed when pancreatic enzymes may be appropriate. Encouraged use of Creon for pt considering pancreatic surgery and mention of yellow stools.   • RD discussed Creon dosage is inadequate per pt weight. RD to communicate with PCP to adjust the dosage.   • Given pt slow GI transit time, RD to reach out to GI doctor to see if there may be a more suitable method to taking creon, to ensure it is most effective.  • RD to send Pancreatic Cancer general nutrition information and suggestions via email.   • RD to follow-up on Friday as able     Pt reports understanding and was receptive to information provided.   RD provided contact information. Encouraged pt to reach out as questions/concerns arise    RD following        "

## 2020-08-31 NOTE — PROGRESS NOTES
Nutrition Services: [facility] for Cancer Referral  75 year old male with diagnosis of malignant neoplasm of tail of pancreas.  Referral received for nutrition services. RD will attempt to see at next oncology appointment or will contact per policy for nutrition assessment.    Please contact as needed.  [phone]

## 2020-09-02 ENCOUNTER — PATIENT OUTREACH (OUTPATIENT)
Dept: OTHER | Facility: MEDICAL CENTER | Age: 75
End: 2020-09-02

## 2020-09-02 ENCOUNTER — TELEPHONE (OUTPATIENT)
Dept: MEDICAL GROUP | Facility: MEDICAL CENTER | Age: 75
End: 2020-09-02

## 2020-09-02 DIAGNOSIS — C25.2 MALIGNANT NEOPLASM OF TAIL OF PANCREAS (HCC): ICD-10-CM

## 2020-09-02 DIAGNOSIS — K86.89 PANCREATIC INSUFFICIENCY: ICD-10-CM

## 2020-09-02 RX ORDER — PANCRELIPASE 36000; 180000; 114000 [USP'U]/1; [USP'U]/1; [USP'U]/1
1-2 CAPSULE, DELAYED RELEASE PELLETS ORAL
Qty: 270 CAP | Refills: 3 | Status: SHIPPED | OUTPATIENT
Start: 2020-09-02 | End: 2020-11-30 | Stop reason: SDUPTHER

## 2020-09-02 NOTE — TELEPHONE ENCOUNTER
----- Message from Joan Matt R.N. sent at 9/2/2020  3:00 PM PDT -----  Regarding: Creon  Bay Zamudio,  I sent you an email about Martin Briceño and I attached the Creon dosing chart in the [facility] email this is it below.    Bay Zamudio,  I am a [facility] Nurse navigator and I am trying so hard to help Martin Briceño MR [id number].  Tex is losing weight so bad due to his Pancreatic Insufficiency.  I found the dosing chart for Creon which I received from an in-service at a luncheon from the company last year I made a copy and attached it.  The Creon tablet he has is 12,000 units and according to the chart at his weight 150-153lb (last I heard from the patient) he needs 36,000 with each meal.  Tex is only taking 4-5 a pills a day of the 12,000 tablets.  I am asking if maybe he could get a newer prescription of the bigger dose pill 36,000, increase in the quantity and the directions on how many to take with each meal.  Dr. Leahy is referring this back to you his PCP.  I really need your help Tex is very frustrated and feels so alone in this.  Unfortunately he is not having a great experience at [facility].    I appreciate your help!         Joan Matt   Nurse Navigator   [address], [place]  [address]  P: [phone]   [email or web address]   For what matters most. A Report to Thank our Community

## 2020-09-02 NOTE — PROGRESS NOTES
Oncology nurse navigation reached out o patient to follow up on some questions he had. Regarding his up and coming chemotherapy.  The patient wanted some general information as to why he had to come back 46 hrs after his chemotherapy and why he would be attached to a pump.  Confirmed with the patient that he does have a chemotherapy education appointment at [facility] on the 10th of September.  ONN reviewed the FOLFIRINOX  With the patient and explained the 5fu infusion at the end would be with a pump infusing the last drug 5FU over 46hrs and that the pump would stay with him.  He needed the second appointment to have the pump detached and his port de-accessed and cleaned.  Patient verbalized understanding.    Patient reports that he is feeling frustrated over the fact that he continues to struggle with weight loss and EPI symptoms he reports loose stools and alot of gas.  We reviewed his creon dosing.  According to the recommended dosing for Creon by the  the patients dose is not high enough for his weight.  This ONN attached the dosing chart and messaged his PCP Dr. Zamudio.  Dr. Zamudio sent in a new prescription for 36,000 dose unit and instructed the patient to take 1-2 pills at meals and 1 tablet with snacks.  This ONN called patient with new prescription information and the instructions on how to take the Creon.  ONN is also going to ask for assist from  for Creon assistance if possible because the patient is in the doughnut hole.

## 2020-09-03 ENCOUNTER — HOSPITAL ENCOUNTER (OUTPATIENT)
Facility: MEDICAL CENTER | Age: 75
End: 2020-09-03
Attending: SURGERY | Admitting: SURGERY
Payer: MEDICARE

## 2020-09-03 ENCOUNTER — APPOINTMENT (OUTPATIENT)
Dept: RADIOLOGY | Facility: MEDICAL CENTER | Age: 75
End: 2020-09-03
Attending: SURGERY
Payer: MEDICARE

## 2020-09-03 ENCOUNTER — ANESTHESIA EVENT (OUTPATIENT)
Dept: SURGERY | Facility: MEDICAL CENTER | Age: 75
End: 2020-09-03
Payer: MEDICARE

## 2020-09-03 ENCOUNTER — ANESTHESIA (OUTPATIENT)
Dept: SURGERY | Facility: MEDICAL CENTER | Age: 75
End: 2020-09-03
Payer: MEDICARE

## 2020-09-03 VITALS
BODY MASS INDEX: 21.48 KG/M2 | RESPIRATION RATE: 16 BRPM | HEIGHT: 71 IN | SYSTOLIC BLOOD PRESSURE: 121 MMHG | OXYGEN SATURATION: 97 % | WEIGHT: 153.44 LBS | TEMPERATURE: 97.5 F | HEART RATE: 57 BPM | DIASTOLIC BLOOD PRESSURE: 72 MMHG

## 2020-09-03 DIAGNOSIS — C25.2 MALIGNANT NEOPLASM OF TAIL OF PANCREAS (HCC): ICD-10-CM

## 2020-09-03 PROCEDURE — 160009 HCHG ANES TIME/MIN: Performed by: SURGERY

## 2020-09-03 PROCEDURE — 700105 HCHG RX REV CODE 258: Performed by: SURGERY

## 2020-09-03 PROCEDURE — 501838 HCHG SUTURE GENERAL: Performed by: SURGERY

## 2020-09-03 PROCEDURE — 160048 HCHG OR STATISTICAL LEVEL 1-5: Performed by: SURGERY

## 2020-09-03 PROCEDURE — 160025 RECOVERY II MINUTES (STATS): Performed by: SURGERY

## 2020-09-03 PROCEDURE — 700101 HCHG RX REV CODE 250: Performed by: SURGERY

## 2020-09-03 PROCEDURE — 700101 HCHG RX REV CODE 250: Performed by: ANESTHESIOLOGY

## 2020-09-03 PROCEDURE — 700111 HCHG RX REV CODE 636 W/ 250 OVERRIDE (IP): Performed by: ANESTHESIOLOGY

## 2020-09-03 PROCEDURE — 160035 HCHG PACU - 1ST 60 MINS PHASE I: Performed by: SURGERY

## 2020-09-03 PROCEDURE — 77001 FLUOROGUIDE FOR VEIN DEVICE: CPT | Mod: 26 | Performed by: SURGERY

## 2020-09-03 PROCEDURE — 36561 INSERT TUNNELED CV CATH: CPT | Mod: RT | Performed by: SURGERY

## 2020-09-03 PROCEDURE — 160039 HCHG SURGERY MINUTES - EA ADDL 1 MIN LEVEL 3: Performed by: SURGERY

## 2020-09-03 PROCEDURE — 160028 HCHG SURGERY MINUTES - 1ST 30 MINS LEVEL 3: Performed by: SURGERY

## 2020-09-03 PROCEDURE — 700102 HCHG RX REV CODE 250 W/ 637 OVERRIDE(OP): Performed by: ANESTHESIOLOGY

## 2020-09-03 PROCEDURE — 160036 HCHG PACU - EA ADDL 30 MINS PHASE I: Performed by: SURGERY

## 2020-09-03 PROCEDURE — 700111 HCHG RX REV CODE 636 W/ 250 OVERRIDE (IP): Performed by: SURGERY

## 2020-09-03 PROCEDURE — 700102 HCHG RX REV CODE 250 W/ 637 OVERRIDE(OP): Performed by: SURGERY

## 2020-09-03 PROCEDURE — 160002 HCHG RECOVERY MINUTES (STAT): Performed by: SURGERY

## 2020-09-03 PROCEDURE — C1788 PORT, INDWELLING, IMP: HCPCS | Performed by: SURGERY

## 2020-09-03 PROCEDURE — A9270 NON-COVERED ITEM OR SERVICE: HCPCS | Performed by: ANESTHESIOLOGY

## 2020-09-03 PROCEDURE — A9270 NON-COVERED ITEM OR SERVICE: HCPCS | Performed by: SURGERY

## 2020-09-03 PROCEDURE — 160046 HCHG PACU - 1ST 60 MINS PHASE II: Performed by: SURGERY

## 2020-09-03 DEVICE — POWER PORTMRI COMPATABLE: Type: IMPLANTABLE DEVICE | Status: FUNCTIONAL

## 2020-09-03 RX ORDER — OXYCODONE HCL 5 MG/5 ML
5 SOLUTION, ORAL ORAL
Status: COMPLETED | OUTPATIENT
Start: 2020-09-03 | End: 2020-09-03

## 2020-09-03 RX ORDER — LIDOCAINE HYDROCHLORIDE 20 MG/ML
INJECTION, SOLUTION EPIDURAL; INFILTRATION; INTRACAUDAL; PERINEURAL PRN
Status: DISCONTINUED | OUTPATIENT
Start: 2020-09-03 | End: 2020-09-03 | Stop reason: SURG

## 2020-09-03 RX ORDER — MEPERIDINE HYDROCHLORIDE 25 MG/ML
12.5 INJECTION INTRAMUSCULAR; INTRAVENOUS; SUBCUTANEOUS
Status: DISCONTINUED | OUTPATIENT
Start: 2020-09-03 | End: 2020-09-03 | Stop reason: HOSPADM

## 2020-09-03 RX ORDER — ACETAMINOPHEN 500 MG
1000 TABLET ORAL ONCE
Status: COMPLETED | OUTPATIENT
Start: 2020-09-03 | End: 2020-09-03

## 2020-09-03 RX ORDER — BUPIVACAINE HYDROCHLORIDE AND EPINEPHRINE 5; 5 MG/ML; UG/ML
INJECTION, SOLUTION EPIDURAL; INTRACAUDAL; PERINEURAL
Status: DISCONTINUED | OUTPATIENT
Start: 2020-09-03 | End: 2020-09-03 | Stop reason: HOSPADM

## 2020-09-03 RX ORDER — TRAMADOL HYDROCHLORIDE 50 MG/1
50 TABLET ORAL EVERY 4 HOURS PRN
Qty: 30 TAB | Refills: 0 | Status: SHIPPED | OUTPATIENT
Start: 2020-09-03 | End: 2020-09-08

## 2020-09-03 RX ORDER — AZELASTINE 1 MG/ML
1 SPRAY, METERED NASAL PRN
COMMUNITY
End: 2020-09-04 | Stop reason: SDUPTHER

## 2020-09-03 RX ORDER — CEFAZOLIN SODIUM 1 G/3ML
INJECTION, POWDER, FOR SOLUTION INTRAMUSCULAR; INTRAVENOUS PRN
Status: DISCONTINUED | OUTPATIENT
Start: 2020-09-03 | End: 2020-09-03 | Stop reason: SURG

## 2020-09-03 RX ORDER — PROMETHAZINE HYDROCHLORIDE 25 MG/1
12.5 TABLET ORAL EVERY 6 HOURS PRN
Qty: 30 TAB | Refills: 0 | Status: SHIPPED | OUTPATIENT
Start: 2020-09-03

## 2020-09-03 RX ORDER — DEXAMETHASONE SODIUM PHOSPHATE 4 MG/ML
INJECTION, SOLUTION INTRA-ARTICULAR; INTRALESIONAL; INTRAMUSCULAR; INTRAVENOUS; SOFT TISSUE PRN
Status: DISCONTINUED | OUTPATIENT
Start: 2020-09-03 | End: 2020-09-03 | Stop reason: SURG

## 2020-09-03 RX ORDER — SODIUM CHLORIDE, SODIUM LACTATE, POTASSIUM CHLORIDE, CALCIUM CHLORIDE 600; 310; 30; 20 MG/100ML; MG/100ML; MG/100ML; MG/100ML
INJECTION, SOLUTION INTRAVENOUS CONTINUOUS
Status: DISCONTINUED | OUTPATIENT
Start: 2020-09-03 | End: 2020-09-03 | Stop reason: HOSPADM

## 2020-09-03 RX ORDER — ONDANSETRON 2 MG/ML
4 INJECTION INTRAMUSCULAR; INTRAVENOUS
Status: COMPLETED | OUTPATIENT
Start: 2020-09-03 | End: 2020-09-03

## 2020-09-03 RX ORDER — TEMAZEPAM 15 MG/1
15 CAPSULE ORAL NIGHTLY PRN
COMMUNITY
End: 2020-09-04 | Stop reason: SDUPTHER

## 2020-09-03 RX ORDER — HEPARIN SODIUM,PORCINE 1000/ML
VIAL (ML) INJECTION
Status: DISCONTINUED | OUTPATIENT
Start: 2020-09-03 | End: 2020-09-03 | Stop reason: HOSPADM

## 2020-09-03 RX ORDER — ONDANSETRON 2 MG/ML
INJECTION INTRAMUSCULAR; INTRAVENOUS PRN
Status: DISCONTINUED | OUTPATIENT
Start: 2020-09-03 | End: 2020-09-03 | Stop reason: SURG

## 2020-09-03 RX ORDER — MIDAZOLAM HYDROCHLORIDE 1 MG/ML
INJECTION INTRAMUSCULAR; INTRAVENOUS PRN
Status: DISCONTINUED | OUTPATIENT
Start: 2020-09-03 | End: 2020-09-03 | Stop reason: SURG

## 2020-09-03 RX ADMIN — FENTANYL CITRATE 50 MCG: 50 INJECTION INTRAMUSCULAR; INTRAVENOUS at 08:22

## 2020-09-03 RX ADMIN — POVIDONE-IODINE 15 ML: 10 SOLUTION TOPICAL at 07:36

## 2020-09-03 RX ADMIN — ACETAMINOPHEN 1000 MG: 500 TABLET ORAL at 07:34

## 2020-09-03 RX ADMIN — CEFAZOLIN 2 G: 330 INJECTION, POWDER, FOR SOLUTION INTRAMUSCULAR; INTRAVENOUS at 08:22

## 2020-09-03 RX ADMIN — DEXAMETHASONE SODIUM PHOSPHATE 4 MG: 4 INJECTION, SOLUTION INTRA-ARTICULAR; INTRALESIONAL; INTRAMUSCULAR; INTRAVENOUS; SOFT TISSUE at 08:27

## 2020-09-03 RX ADMIN — SODIUM CHLORIDE, POTASSIUM CHLORIDE, SODIUM LACTATE AND CALCIUM CHLORIDE: 600; 310; 30; 20 INJECTION, SOLUTION INTRAVENOUS at 07:37

## 2020-09-03 RX ADMIN — ONDANSETRON 4 MG: 2 INJECTION INTRAMUSCULAR; INTRAVENOUS at 08:47

## 2020-09-03 RX ADMIN — FENTANYL CITRATE 25 MCG: 50 INJECTION INTRAMUSCULAR; INTRAVENOUS at 08:39

## 2020-09-03 RX ADMIN — LIDOCAINE HYDROCHLORIDE 0.5 ML: 10 INJECTION, SOLUTION EPIDURAL; INFILTRATION; INTRACAUDAL at 07:36

## 2020-09-03 RX ADMIN — FENTANYL CITRATE 25 MCG: 50 INJECTION INTRAMUSCULAR; INTRAVENOUS at 08:49

## 2020-09-03 RX ADMIN — MIDAZOLAM HYDROCHLORIDE 1 MG: 1 INJECTION, SOLUTION INTRAMUSCULAR; INTRAVENOUS at 08:15

## 2020-09-03 RX ADMIN — PROPOFOL 100 MG: 10 INJECTION, EMULSION INTRAVENOUS at 08:22

## 2020-09-03 RX ADMIN — LIDOCAINE HYDROCHLORIDE 4 ML: 20 INJECTION, SOLUTION EPIDURAL; INFILTRATION; INTRACAUDAL at 08:22

## 2020-09-03 RX ADMIN — OXYCODONE HYDROCHLORIDE 5 MG: 5 SOLUTION ORAL at 09:30

## 2020-09-03 RX ADMIN — ONDANSETRON 4 MG: 2 INJECTION INTRAMUSCULAR; INTRAVENOUS at 10:06

## 2020-09-03 ASSESSMENT — PAIN DESCRIPTION - PAIN TYPE
TYPE: SURGICAL PAIN
TYPE: SURGICAL PAIN

## 2020-09-03 ASSESSMENT — FIBROSIS 4 INDEX: FIB4 SCORE: 4.27

## 2020-09-03 ASSESSMENT — PAIN SCALES - GENERAL: PAIN_LEVEL: 0

## 2020-09-03 NOTE — OP REPORT
DATE OF SERVICE:  09/03/2020    INDICATIONS:  The patient is a 75-year-old male patient known to me status   post distal pancreatectomy for pancreatic cancer.  The patient is in need of   adjuvant chemotherapy, so the patient is scheduled for placement of a right   cephalic PowerPort.    SURGEON:  Dagoberto Gann MD    ASSISTANT SURGEON:  None.    ANESTHESIOLOGIST:  Brianna Zapata MD    ANESTHESIA:  General and local anesthetic.    ESTIMATED BLOOD LOSS:  Less than 5 mL    COMPLICATIONS:  None.    FINDINGS:  Fluoroscopically placed catheter to the superior vena cava atrial   junction without any ectopy x3 with great inflow and outflow.    PREOPERATIVE DIAGNOSIS:  Pancreatic cancer.    POSTOPERATIVE DIAGNOSIS:  Pancreatic cancer.    PROCEDURE DONE:  Placement of right cephalic cutdown PowerPort.    CASE CLASS:  Clean.    DESCRIPTION OF PROCEDURE:  The patient was brought to OR, placed under general   anesthetic with right arm tucked, left shoulder exposed and neck prepped with   chlorhexidine prep, covered with sterile drapes, given preoperative   antibiotics.  Timeout was done, which was adequate.  At that point, he was   given 5 mL of 0.5% Marcaine with epinephrine in the right deltopectoral   groove.  Incision was made with #10 blade Bovie electrocautery.  Upon entering   the deltopectoral groove, we used Metzenbaum scissors as well as DeBakey and   identified the cephalic vein.  We got a fairly large cephalic vein.  We got   proximal and distal control with the vein.  We then proceeded with tying the   distal, made a small venotomy and then sent a 10-Georgian flush PowerPort   catheter to the superior vena cava atrial junction under fluoroscopic   guidance.  Once in position without any ectopy, he had grade inflow and   outflow.  We tied the proximal, clamped with a rubber shot, cut and attached   to a flush PowerPort, unclamped, tested for a second time having great inflow   and outflow.  At that  point, the patient was reversed Trendelenburg and a   pocket was created overlying the pectoral fascia.  The port was secured to the   pec fascia with interrupted 2-0 Prolene x4.  Once in its final position, we   tested for a third time having great inflow and outflow.  At that point, he   was given a total of 30 mL of 0.5% Marcaine with epinephrine throughout the   area of the pec fascia, soft tissue and skin.  Soft tissue was brought   together with interrupted 3-0 Vicryl, skin was closed with running 4-0   Monocryl.  Benzoin and Steri-Strips applied, covered with 2x2, Tegaderm,   extubated, and taken to recovery room.       ____________________________________     MD JESSENIA Jefferson / NTS    DD:  09/03/2020 09:10:45  DT:  09/03/2020 09:20:17    D#:  [id number]  Job#:  [id number]

## 2020-09-03 NOTE — ANESTHESIA TIME REPORT
Anesthesia Start and Stop Event Times     Date Time Event    9/3/2020 0807 Ready for Procedure     0818 Anesthesia Start     0903 Anesthesia Stop        Responsible Staff  09/03/20    Name Role Begin End    Brianna Zapata M.D. Anesth 0818 0903        Preop Diagnosis (Free Text):  Pre-op Diagnosis     PANCREATIC CANCER        Preop Diagnosis (Codes):    Post op Diagnosis  Pancreatic cancer (HCC)      Premium Reason  Non-Premium    Comments:

## 2020-09-03 NOTE — ANESTHESIA PREPROCEDURE EVALUATION
76 yo w/pancreatic cancer    Relevant Problems   ANESTHESIA (within normal limits)      NEURO   (+) Chronic nonintractable headache      CARDIAC   (+) Essential hypertension      GI   (+) Gastroesophageal reflux disease without esophagitis      ENDO   (+) Hypothyroidism      Other   (+) Pure hypercholesterolemia   (+) Sjogren's syndrome (HCC)     Hx TIAs 13 yrs ago- without residual deficit    Physical Exam    Airway   Mallampati: II  TM distance: >3 FB  Neck ROM: full       Cardiovascular   Rhythm: regular  Rate: normal  (-) murmur     Dental - normal exam           Pulmonary   Breath sounds clear to auscultation     Abdominal    Neurological - normal exam                 Anesthesia Plan    ASA 3   ASA physical status 3 criteria: CVA or TIA - history (> 3 months)    Plan - general       Airway plan will be LMA        Induction: intravenous    Postoperative Plan: Postoperative administration of opioids is intended.    Pertinent diagnostic labs and testing reviewed    Informed Consent:    Anesthetic plan and risks discussed with patient.

## 2020-09-03 NOTE — OR NURSING
1005-pt states he has mild nausea off and on, pt requesting medication for thi, zofran given as ordered.

## 2020-09-03 NOTE — ANESTHESIA POSTPROCEDURE EVALUATION
Patient: Martin Briceño    Procedure Summary     Date: 09/03/20 Room / Location: [place] [address] / SURGERY [facility]    Anesthesia Start: 0818 Anesthesia Stop: 0903    Procedure: INSERTION, CATHETER- CEPHALIC POWER PORT (Right Chest) Diagnosis: (PANCREATIC CANCER)    Surgeon: Dagoberto Gann M.D. Responsible Provider: Brianna Zapata M.D.    Anesthesia Type: general ASA Status: 3          Final Anesthesia Type: general  Last vitals  BP   Blood Pressure : 126/65    Temp   36.2 °C (97.2 °F)    Pulse   Pulse: 68   Resp   12    SpO2   100 %      Anesthesia Post Evaluation    Patient location during evaluation: PACU  Patient participation: complete - patient participated  Level of consciousness: awake  Pain score: 0    Airway patency: patent  Anesthetic complications: no  Cardiovascular status: adequate  Respiratory status: acceptable  Hydration status: acceptable    PONV: none           Nurse Pain Score: 0 (NPRS)

## 2020-09-03 NOTE — ANESTHESIA PROCEDURE NOTES
Airway    Date/Time: 9/3/2020 8:23 AM  Performed by: Brianna Zapata M.D.  Authorized by: Brianna Zapata M.D.     Location:  OR  Urgency:  Elective  Indications for Airway Management:  Anesthesia      Spontaneous Ventilation: absent    Sedation Level:  Deep  Preoxygenated: Yes    Mask Difficulty Assessment:  0 - not attempted  Final Airway Type:  Supraglottic airway  Final Supraglottic Airway:  Standard LMA    SGA Size:  4  Number of Attempts at Approach:  1

## 2020-09-03 NOTE — DISCHARGE INSTRUCTIONS
ACTIVITY: Rest and take it easy for the first 24 hours.  A responsible adult is recommended to remain with you during that time.  It is normal to feel sleepy.  We encourage you to not do anything that requires balance, judgment or coordination.    MILD FLU-LIKE SYMPTOMS ARE NORMAL. YOU MAY EXPERIENCE GENERALIZED MUSCLE ACHES, THROAT IRRITATION, HEADACHE AND/OR SOME NAUSEA.    FOR 24 HOURS DO NOT:  Drive, operate machinery or run household appliances.  Drink beer or alcoholic beverages.   Make important decisions or sign legal documents.    SPECIAL INSTRUCTIONS:     OK to shower in the morning (9/4) with dressings in place  Remove dressing in 6 days (on Wednesday, 9/9)  Follow-up with medical oncology    DIET: To avoid nausea, slowly advance diet as tolerated, avoiding spicy or greasy foods for the first day.  Add more substantial food to your diet according to your physician's instructions.  Babies can be fed formula or breast milk as soon as they are hungry.  INCREASE FLUIDS AND FIBER TO AVOID CONSTIPATION.    SURGICAL DRESSING/BATHING:   OK to shower in the morning (9/4) with dressings in place  Remove dressing in 6 days (on Wednesday, 9/9)    FOLLOW-UP APPOINTMENT:  A follow-up appointment should be arranged with your doctor; call to schedule.    You should CALL YOUR PHYSICIAN if you develop:  Fever greater than 101 degrees F.  Pain not relieved by medication, or persistent nausea or vomiting.  Excessive bleeding (blood soaking through dressing) or unexpected drainage from the wound.  Extreme redness or swelling around the incision site, drainage of pus or foul smelling drainage.  Inability to urinate or empty your bladder within 8 hours.  Problems with breathing or chest pain.    You should call 911 if you develop problems with breathing or chest pain.  If you are unable to contact your doctor or surgical center, you should go to the nearest emergency room or urgent care center.  Physician's telephone #:   Azeem [phone]    If any questions arise, call your doctor.  If your doctor is not available, please feel free to call the Surgical Center at [phone].  The Center is open Monday through Friday from 7AM to 7PM.  You can also call the HEALTH HOTLINE open 24 hours/day, 7 days/week and speak to a nurse at [phone], or toll free at [phone].    A registered nurse may call you a few days after your surgery to see how you are doing after your procedure.    MEDICATIONS: Resume taking daily medication.  Take prescribed pain medication with food.  If no medication is prescribed, you may take non-aspirin pain medication if needed.  PAIN MEDICATION CAN BE VERY CONSTIPATING.  Take a stool softener or laxative such as senokot, pericolace, or milk of magnesia if needed.    Prescription given for tramadol.  Last pain medication given at 9:30 am.    If your physician has prescribed pain medication that includes Acetaminophen (Tylenol), do not take additional Acetaminophen (Tylenol) while taking the prescribed medication.    Depression / Suicide Risk    As you are discharged from this [facility] facility, it is important to learn how to keep safe from harming yourself.    Recognize the warning signs:  · Abrupt changes in personality, positive or negative- including increase in energy   · Giving away possessions  · Change in eating patterns- significant weight changes-  positive or negative  · Change in sleeping patterns- unable to sleep or sleeping all the time   · Unwillingness or inability to communicate  · Depression  · Unusual sadness, discouragement and loneliness  · Talk of wanting to die  · Neglect of personal appearance   · Rebelliousness- reckless behavior  · Withdrawal from people/activities they love  · Confusion- inability to concentrate     If you or a loved one observes any of these behaviors or has concerns about self-harm, here's what you can do:  · Talk about it- your feelings and  reasons for harming yourself  · Remove any means that you might use to hurt yourself (examples: pills, rope, extension cords, firearm)  · Get professional help from the community (Mental Health, Substance Abuse, psychological counseling)  · Do not be alone:Call your Safe Contact- someone whom you trust who will be there for you.  · Call your local CRISIS HOTLINE 702-6157 or 935-507-8785  · Call your local Children's Mobile Crisis Response Team [place] (861) 137-1531 or www.[employer]  · Call the toll free National Suicide Prevention Hotlines   · National Suicide Prevention Lifeline 817-394-IWDI (5899)  · National Hope Line Network 800-SUICIDE (126-6305)

## 2020-09-03 NOTE — ANESTHESIA QCDR
2019 [facility] Clinical Data Registry (for Quality Improvement)     Postoperative nausea/vomiting risk protocol (Adult = 18 yrs and Pediatric 3-17 yrs)- (430 and 463)  General inhalation anesthetic (NOT TIVA) with PONV risk factors: No  Provision of anti-emetic therapy with at least 2 different classes of agents: N/A  Patient DID NOT receive anti-emetic therapy and reason is documented in Medical Record: N/A    Multimodal Pain Management- (477)  Non-emergent surgery AND patient age >= 18: Yes  Use of Multimodal Pain Management, two or more drugs and/or interventions, NOT including systemic opioids: Yes  Exception: Documented allergy to multiple classes of analgesics: N/A    Smoking Abstinence (404)  Patient is current smoker (cigarette, pipe, e-cig, marijuanna): No  Elective Surgery:   Abstinence instructions provided prior to day of surgery:   Patient abstained from smoking on day of surgery:     Pre-Op Beta-Blocker in Isolated CABG (44)  Isolated CABG AND patient age >= 18: No  Beta-blocker admin within 24 hours of surgical incision:   Exception:of medical reason(s) for not administering beta blocker within 24 hours prior to surgical incision (e.g., not  indicated,other medical reason):     PACU assessment of acute postoperative pain prior to Anesthesia Care End- Applies to Patients Age = 18- (ABG7)  Initial PACU pain score is which of the following: < 7/10  Patient unable to report pain score: N/A    Post-anesthetic transfer of care checklist/protocol to PACU/ICU- (426 and 427)  Upon conclusion of case, patient transferred to which of the following locations: PACU/Non-ICU  Use of transfer checklist/protocol: Yes  Exclusion: Service Performed in Patient Hospital Room (and thus did not require transfer): N/A  Unplanned admission to ICU related to anesthesia service up through end of PACU care- (MD51)  Unplanned admission to ICU (not initially anticipated at anesthesia start time): No

## 2020-09-04 ENCOUNTER — PATIENT MESSAGE (OUTPATIENT)
Dept: MEDICAL GROUP | Facility: MEDICAL CENTER | Age: 75
End: 2020-09-04

## 2020-09-04 ENCOUNTER — TELEPHONE (OUTPATIENT)
Dept: NUTRITION | Facility: MEDICAL CENTER | Age: 75
End: 2020-09-04

## 2020-09-04 DIAGNOSIS — N40.1 BENIGN PROSTATIC HYPERPLASIA WITH LOWER URINARY TRACT SYMPTOMS, SYMPTOM DETAILS UNSPECIFIED: ICD-10-CM

## 2020-09-04 DIAGNOSIS — F51.01 PRIMARY INSOMNIA: ICD-10-CM

## 2020-09-04 DIAGNOSIS — N52.9 ERECTILE DYSFUNCTION, UNSPECIFIED ERECTILE DYSFUNCTION TYPE: ICD-10-CM

## 2020-09-04 RX ORDER — OMEPRAZOLE 40 MG/1
40 CAPSULE, DELAYED RELEASE ORAL 2 TIMES DAILY
Qty: 90 CAP | Refills: 3 | Status: SHIPPED | OUTPATIENT
Start: 2020-09-04 | End: 2020-11-24 | Stop reason: SDUPTHER

## 2020-09-04 RX ORDER — ALBUTEROL SULFATE 90 UG/1
2 AEROSOL, METERED RESPIRATORY (INHALATION) EVERY 4 HOURS PRN
Qty: 48 G | Refills: 3 | Status: SHIPPED | OUTPATIENT
Start: 2020-09-04

## 2020-09-04 RX ORDER — ZOLPIDEM TARTRATE 10 MG/1
2.5 TABLET ORAL NIGHTLY PRN
Qty: 30 TAB | Refills: 3 | Status: SHIPPED | OUTPATIENT
Start: 2020-09-04 | End: 2020-09-14 | Stop reason: SDUPTHER

## 2020-09-04 RX ORDER — TEMAZEPAM 15 MG/1
15 CAPSULE ORAL NIGHTLY PRN
Qty: 90 CAP | Refills: 3 | Status: SHIPPED | OUTPATIENT
Start: 2020-09-04 | End: 2020-12-03

## 2020-09-04 RX ORDER — LEVOTHYROXINE SODIUM 0.12 MG/1
125 TABLET ORAL
Qty: 90 TAB | Refills: 3 | Status: SHIPPED | OUTPATIENT
Start: 2020-09-04

## 2020-09-04 RX ORDER — ATORVASTATIN CALCIUM 10 MG/1
10 TABLET, FILM COATED ORAL EVERY MORNING
Qty: 90 TAB | Refills: 3 | Status: SHIPPED | OUTPATIENT
Start: 2020-09-04

## 2020-09-04 RX ORDER — HYDROXYCHLOROQUINE SULFATE 200 MG/1
TABLET, FILM COATED ORAL
Qty: 180 TAB | Refills: 3 | Status: SHIPPED | OUTPATIENT
Start: 2020-09-04

## 2020-09-04 RX ORDER — ATORVASTATIN CALCIUM 20 MG/1
20 TABLET, FILM COATED ORAL DAILY
Qty: 90 TAB | Refills: 3 | Status: SHIPPED | OUTPATIENT
Start: 2020-09-04

## 2020-09-04 RX ORDER — TADALAFIL 10 MG/1
10 TABLET ORAL EVERY MORNING
Qty: 90 TAB | Refills: 3 | Status: SHIPPED
Start: 2020-09-04 | End: 2020-09-08

## 2020-09-04 RX ORDER — LIOTHYRONINE SODIUM 5 UG/1
5 TABLET ORAL DAILY
Qty: 90 TAB | Refills: 3 | Status: SHIPPED | OUTPATIENT
Start: 2020-09-04

## 2020-09-04 RX ORDER — AZELASTINE 1 MG/ML
1 SPRAY, METERED NASAL 2 TIMES DAILY PRN
Qty: 90 ML | Refills: 3 | Status: SHIPPED | OUTPATIENT
Start: 2020-09-04

## 2020-09-04 NOTE — TELEPHONE ENCOUNTER
Nutrition Services: Telephone Encounter    RD called for check-in with pt. RD reviewed Creon prescription. Pt states has not had diarrhea, though has not yet picked up. Mentions new prescription is 36,000 units, and plans to pick this up tomorrow.  was told to take 1 with a meal for now. Pt does relay difficulties with [facility] scheduling and set up for chemo,  has not yet chosen if he will receive treatment through [facility] or [place]. Pt does ask questions regarding if he should reintroduce milk, as was hoping to do so.  has not been following gluten-free diet anymore, has actually started eating sourdough bread and is tolerating this fine. Pt asks if has to take Creon when eating 2-3 baby carrots.     Dietary recall per pt    -Couple of eggs with toast, blueberries, and blackberries  -Protein shake with 1/2 avocado, tanisha seeds, flaxseeds, nuts, and a hamburger livan on the side  -2 pieces of pork from Chinese restaurant  -plant-based yogurt with tangerine  -Ensure     Recommendations:  · Recommended starting with Lactaid and assessing tolerance. Discussed no lactose in Ensure, and therefore should tolerate Lactaid milk as is tolerating Ensure. Discussed Greek yogurt and cheese easier to tolerate than regular milk, and thereby would be good to include in small amounts and gradually increase amount as able.   · RD supported current diet regimen as has good variety. Discussed good to slowly increase calories and protein  · RD provided empathy for issues with chemotherapy initiation.  ·  RD discussed does not need to take Creon if only have a few carrots or crackers. Discussed if having something with more fat ie: crackers with peanut butter than it may be advisable to take the Creon. Pt verbalizes understanding.       RD provided contact information for pt to follow-up per his preference.   RD available PRN.   [id number]

## 2020-09-06 ENCOUNTER — HOSPITAL ENCOUNTER (EMERGENCY)
Facility: MEDICAL CENTER | Age: 75
End: 2020-09-06
Attending: EMERGENCY MEDICINE
Payer: MEDICARE

## 2020-09-06 VITALS
HEART RATE: 65 BPM | RESPIRATION RATE: 16 BRPM | OXYGEN SATURATION: 95 % | TEMPERATURE: 98.5 F | DIASTOLIC BLOOD PRESSURE: 74 MMHG | SYSTOLIC BLOOD PRESSURE: 118 MMHG

## 2020-09-06 DIAGNOSIS — E86.0 DEHYDRATION: ICD-10-CM

## 2020-09-06 LAB
ALBUMIN SERPL BCP-MCNC: 4 G/DL (ref 3.2–4.9)
ALBUMIN/GLOB SERPL: 1.4 G/DL
ALP SERPL-CCNC: 54 U/L (ref 30–99)
ALT SERPL-CCNC: 10 U/L (ref 2–50)
ANION GAP SERPL CALC-SCNC: 12 MMOL/L (ref 7–16)
APPEARANCE UR: CLEAR
AST SERPL-CCNC: 18 U/L (ref 12–45)
BASOPHILS # BLD AUTO: 0.6 % (ref 0–1.8)
BASOPHILS # BLD: 0.04 K/UL (ref 0–0.12)
BILIRUB SERPL-MCNC: 0.3 MG/DL (ref 0.1–1.5)
BILIRUB UR QL STRIP.AUTO: NEGATIVE
BUN SERPL-MCNC: 12 MG/DL (ref 8–22)
CALCIUM SERPL-MCNC: 9 MG/DL (ref 8.4–10.2)
CHLORIDE SERPL-SCNC: 91 MMOL/L (ref 96–112)
CO2 SERPL-SCNC: 28 MMOL/L (ref 20–33)
COLOR UR: YELLOW
CREAT SERPL-MCNC: 0.73 MG/DL (ref 0.5–1.4)
EKG IMPRESSION: NORMAL
EOSINOPHIL # BLD AUTO: 0.1 K/UL (ref 0–0.51)
EOSINOPHIL NFR BLD: 1.5 % (ref 0–6.9)
ERYTHROCYTE [DISTWIDTH] IN BLOOD BY AUTOMATED COUNT: 45 FL (ref 35.9–50)
GLOBULIN SER CALC-MCNC: 2.8 G/DL (ref 1.9–3.5)
GLUCOSE SERPL-MCNC: 123 MG/DL (ref 65–99)
GLUCOSE UR STRIP.AUTO-MCNC: NEGATIVE MG/DL
HCT VFR BLD AUTO: 34.4 % (ref 42–52)
HGB BLD-MCNC: 11.8 G/DL (ref 14–18)
IMM GRANULOCYTES # BLD AUTO: 0.03 K/UL (ref 0–0.11)
IMM GRANULOCYTES NFR BLD AUTO: 0.4 % (ref 0–0.9)
KETONES UR STRIP.AUTO-MCNC: NEGATIVE MG/DL
LEUKOCYTE ESTERASE UR QL STRIP.AUTO: NEGATIVE
LYMPHOCYTES # BLD AUTO: 1.8 K/UL (ref 1–4.8)
LYMPHOCYTES NFR BLD: 26.9 % (ref 22–41)
MCH RBC QN AUTO: 33.7 PG (ref 27–33)
MCHC RBC AUTO-ENTMCNC: 34.3 G/DL (ref 33.7–35.3)
MCV RBC AUTO: 98.3 FL (ref 81.4–97.8)
MICRO URNS: NORMAL
MONOCYTES # BLD AUTO: 1.18 K/UL (ref 0–0.85)
MONOCYTES NFR BLD AUTO: 17.6 % (ref 0–13.4)
NEUTROPHILS # BLD AUTO: 3.55 K/UL (ref 1.82–7.42)
NEUTROPHILS NFR BLD: 53 % (ref 44–72)
NITRITE UR QL STRIP.AUTO: NEGATIVE
NRBC # BLD AUTO: 0 K/UL
NRBC BLD-RTO: 0 /100 WBC
PH UR STRIP.AUTO: 7.5 [PH] (ref 5–8)
PLATELET # BLD AUTO: 283 K/UL (ref 164–446)
PMV BLD AUTO: 8.6 FL (ref 9–12.9)
POTASSIUM SERPL-SCNC: 4 MMOL/L (ref 3.6–5.5)
PROT SERPL-MCNC: 6.8 G/DL (ref 6–8.2)
PROT UR QL STRIP: NEGATIVE MG/DL
RBC # BLD AUTO: 3.5 M/UL (ref 4.7–6.1)
RBC UR QL AUTO: NEGATIVE
SODIUM SERPL-SCNC: 131 MMOL/L (ref 135–145)
SP GR UR STRIP.AUTO: <=1.005
WBC # BLD AUTO: 6.7 K/UL (ref 4.8–10.8)

## 2020-09-06 PROCEDURE — 81003 URINALYSIS AUTO W/O SCOPE: CPT

## 2020-09-06 PROCEDURE — 85025 COMPLETE CBC W/AUTO DIFF WBC: CPT

## 2020-09-06 PROCEDURE — 93005 ELECTROCARDIOGRAM TRACING: CPT | Performed by: EMERGENCY MEDICINE

## 2020-09-06 PROCEDURE — 99283 EMERGENCY DEPT VISIT LOW MDM: CPT

## 2020-09-06 PROCEDURE — 700105 HCHG RX REV CODE 258: Performed by: EMERGENCY MEDICINE

## 2020-09-06 PROCEDURE — 80053 COMPREHEN METABOLIC PANEL: CPT

## 2020-09-06 RX ORDER — SODIUM CHLORIDE, SODIUM LACTATE, POTASSIUM CHLORIDE, CALCIUM CHLORIDE 600; 310; 30; 20 MG/100ML; MG/100ML; MG/100ML; MG/100ML
1000 INJECTION, SOLUTION INTRAVENOUS ONCE
Status: COMPLETED | OUTPATIENT
Start: 2020-09-06 | End: 2020-09-06

## 2020-09-06 RX ADMIN — SODIUM CHLORIDE, POTASSIUM CHLORIDE, SODIUM LACTATE AND CALCIUM CHLORIDE 1000 ML: 600; 310; 30; 20 INJECTION, SOLUTION INTRAVENOUS at 05:35

## 2020-09-06 NOTE — ED NOTES
Discharge instructions provided.  Pt verbalized the understanding of discharge instructions to follow up with PCP/oncology and to return to ER if condition worsens.  Pt ambulated out of ER without difficulty.

## 2020-09-06 NOTE — DISCHARGE INSTRUCTIONS
You were seen in the emergency department for dehydration.  Your symptoms improved with fluids.  The remainder of your work-up was reassuring.  Please continue to drink plenty of fluids and eat regular meals.    Your blood pressure was within the normal range.    Please follow-up closely with your regular doctor and your oncologist.    Please return to the emergency department or seek medical attention if you develop:  Lightheadedness, difficulty breathing, chest pain, fevers, vomiting, any other new or concerning findings    ================================  Coronavirus Information    Your visit did NOT appear to relate to coronavirus, but if you or your family develop symptoms that concern you for coronavirus (please see CDC website for symptoms), please contact the [facility] [place] (or your local health department)  or your healthcare provider before going to a medical facility:    [facility]  [place] hotline: 736.900.1300    Information is available from the Centers for Disease Control and Prevention  www.CDC.gov    and     [facility]  [email or web address]    If you are severely ill or having a hard time breathing, please immediatly seek medical care. Notify the  or Emergency Department Triage about your symptoms.

## 2020-09-06 NOTE — ED NOTES
Urine collected and sent to lab. Very clear and pale in color. Pt reports he has drank a gallon of water since last night.

## 2020-09-06 NOTE — ED PROVIDER NOTES
ED Provider Note      Means of Arrival: Private vehicle  History obtained from: Patient, medical record      CHIEF COMPLAINT  Chief Complaint   Patient presents with   • Blood Pressure Problem       HPI  Martin Briceño is a 75 y.o. male who presents with concern for dehydration.  The patient reports that his blood pressure has been running low recently.  He does have a history of pancreatic cancer, status post surgery.  He recently had a port placed without complication.  They have been adjusting his pancreatic enzymes, which has been working well however he has been having significant gas over the past few days.  He recently has been requiring IV hydration through his oncologist, receiving approximate 1 L a day in their clinic.  He reports his blood pressure was low yesterday and drank a significant amount of water but continues to feel dehydrated.  He feels slightly lightheaded.    REVIEW OF SYSTEMS  CONSTITUTIONAL:  No fever.  CARDIOVASCULAR:  No chest discomfort.  RESPIRATORY:  No pleuritic chest pain.  GASTROINTESTINAL:  No abdominal pain.  GENITOURINARY:   No dysuria.  MUSCULOSKELETAL:  No arthralgia.  SKIN:  No rash or suspicious lesions.  NEUROLOGIC:   No headache.  ENDOCRINE:  No facial edema.  HEMATOLOGIC:  No abnormal bleeding.       See HPI for further details.   All other systems are negative.     PAST MEDICAL HISTORY  Past Medical History:   Diagnosis Date   • Adverse effect of anesthesia     Versed is not effective   • Anemia    • Anesthesia     didn't tolerate versed.   • Bowel habit changes     diarrhea   • Cancer (HCC) 08/11/2020    pancreatic   • Cataract     IOL OU   • Environmental and seasonal allergies    • GERD (gastroesophageal reflux disease)    • High cholesterol    • History of BPH    • Hypertension     reports history of, states MD stopped meds.   • Pancreatic mass    • Post-nasal drip    • Sjogren's syndrome (HCC)    • Stroke (HCC) 2007    3 tia's per pt   • Unspecified disorder  of thyroid     3/4 of the thyroid removed       FAMILY HISTORY  Family History   Problem Relation Age of Onset   • COPD Mother    • Heart Disease Father         AJenny Bone       SOCIAL HISTORY   reports that he quit smoking about 30 years ago. His smoking use included cigarettes. He started smoking about 57 years ago. He has a 30.00 pack-year smoking history. He has never used smokeless tobacco. He reports previous alcohol use. He reports current drug use. Drugs: Marijuana and Inhaled.    SURGICAL HISTORY  Past Surgical History:   Procedure Laterality Date   • CATH PLACEMENT Right 9/3/2020    Procedure: INSERTION, CATHETER- CEPHALIC POWER PORT;  Surgeon: Dagoberto Gann M.D.;  Location: [facility];  Service: General   • PANCREATECTOMY  8/13/2020    Procedure: PANCREATECTOMY-DISTAL, OMENTAL FLAP, INTRA OPERATIVE ULTRA SOUND, GASTRIC WEDGE RESECTION;  Surgeon: Dagoberto Gann M.D.;  Location: [facility];  Service: General   • SPLENECTOMY  8/13/2020    Procedure: SPLENECTOMY;  Surgeon: Dagoberto Gann M.D.;  Location: [facility];  Service: General   • PB ENDOSCOPIC US EXAM, ESOPH  8/5/2020    Procedure: EGD, WITH ENDOSCOPIC US - UPPER CURVILINEAR;  Surgeon: Diego Youssef M.D.;  Location: [facility];  Service: Gastroenterology   • GASTROSCOPY-ENDO  8/5/2020    Procedure: GASTROSCOPY - WITH GASTRIC AND DUODENAL BIOPSIES;  Surgeon: Diego Youssef M.D.;  Location: [facility];  Service: Gastroenterology   • EGD WITH ASP/BX  8/5/2020    Procedure: EGD, WITH ASPIRATION BIOPSY - PANCREAS;  Surgeon: Diego Youssef M.D.;  Location: [facility];  Service: Gastroenterology   • PB SHLDR ARTHROSCOP,SURG,W/ROTAT CUFF REPB Left 6/1/2020    Procedure: ARTHROSCOPY, SHOULDER, WITH ROTATOR CUFF REPAIR; EXTENSIVE DEBRIDEMENT; SUBSCAPULARIS REPAIR;  Surgeon: Newton Perea M.D.;  Location: [facility];  Service:  Orthopedics   • PB SHLDR ARTHROSCOP,PART ACROMIOPLAS Left 6/1/2020    Procedure: DECOMPRESSION, SHOULDER, ARTHROSCOPIC - SUBACROMIAL;  Surgeon: Newton Perea M.D.;  Location: [facility];  Service: Orthopedics   • INGUINAL HERNIA LAPAROSCOPIC Left 2016   • SHOULDER ARTHROSCOPY Right 2015    RCR   • GASTROSCOPY WITH BIOPSY  6/11/08    Performed by JAYNE RIZO at [facility]   • EGD W/ENDOSCOPIC ULTRASOUND  6/11/08    Performed by JAYNE RIZO at [facility]   • THYROIDECTOMY  2003    Partial   • APPENDECTOMY  1976   • HERNIA REPAIR         CURRENT MEDICATIONS  Home Medications    **Home medications have not yet been reviewed for this encounter**         ALLERGIES  Allergies   Allergen Reactions   • Mirtazapine Unspecified     Psychological issues   • Norco [Hydrocodone-Acetaminophen] Shortness of Breath and Swelling   • Tape Itching     Paper tape ok       PHYSICAL EXAM  VITAL SIGNS: /74   Pulse 65   Temp 36.9 °C (98.5 °F) (Temporal)   Resp 16   SpO2 95%    Gen: Alert  HENT: ATNC  Eyes: Normal conjunctiva  Neck: trachea midline  Resp: no respiratory distress, clear to auscultation bilateral.  Port in place over right superior chest, no tenderness or erythema  CV: No JVD, regular rate and rhythm, no murmurs, rubs, or  Abd: non-distended, nontender.  Well-healed midline surgical scar, evolving ecchymosis  Ext: No deformities, no pedal edema  Psych: normal mood  Neuro: speech fluent       LABS  Labs Reviewed   CBC WITH DIFFERENTIAL - Abnormal; Notable for the following components:       Result Value    RBC 3.50 (*)     Hemoglobin 11.8 (*)     Hematocrit 34.4 (*)     MCV 98.3 (*)     MCH 33.7 (*)     MPV 8.6 (*)     Monocytes 17.60 (*)     Monos (Absolute) 1.18 (*)     All other components within normal limits   COMP METABOLIC PANEL - Abnormal; Notable for the following components:    Sodium 131 (*)     Chloride 91 (*)     Glucose 123 (*)     All  other components within normal limits   URINALYSIS   ESTIMATED GFR        EKG  Results for orders placed or performed during the hospital encounter of 20   EKG (NOW)   Result Value Ref Range    Report       [facility] Emergency Dept.    Test Date:  2020  Pt Name:    RY BRICEÑO                 Department: [facility]  MRN:        [id number]                      [facility]  Gender:     Male                         Technician:   :        [id number]                   Requested By:EJRALD BRICEÑO  Order #:    [id number]                    Reading MD: Jerald Briceño    Measurements  Intervals                                Axis  Rate:       58                           P:          10  CA:         164                          QRS:        -34  QRSD:       92                           T:          14  QT:         444  QTc:        437    Interpretive Statements  SINUS BRADYCARDIA  Compared to ECG 2020 09:05:34  Bradycardia, nonsinus no longer present  Electronically Signed On 2020 6:02:19 PDT by Jerald Briceño          COURSE & MEDICAL DECISION MAKING  Pertinent Labs & Imaging studies reviewed. (See chart for details)    Patient presents with concern for dehydration.        Hydration: He reports he feels dehydrated, has somewhat dry mucous membranes and given his history of fluid losses and attempted oral hydration I believe IV hydration will be required.      He has no focal neurologic deficits to suggest neurologic etiology for his dizziness.  His EKG is reassuring.  Low suspicion for ischemia, PE, cardiac dysrhythmia.    6:42 AM patient feeling improved after IV fluids, his blood pressure has increased.    Patient's urinalysis demonstrates very dilute urine, concerning for renal losses.  Thankfully, he does not have significant hyponatremia from his fluid intake.  No evidence of diabetes.  Patient was counseled on increased sodium intake and balanced fluid and salt intake to help  him retain the fluid, as well as increased caloric intake given his need to gain weight prior to initiation of chemotherapy.  He is feeling improved, feels safe for discharge at this time.  He was given return precautions, anticipatory guidance, and the opportunity to ask questions prior to discharge.      Appropriate PPE were worn at this encounter.     FINAL IMPRESSION  1. Dehydration             DISPOSITION:  Patient will be discharged home in stable condition.    FOLLOW UP:  Vasu Zamudio M.D.  [id number]  [place] [id number] [phone]    Schedule an appointment as soon as possible for a visit       [facility], Emergency Dept  [address] [id number] [phone]    If symptoms worsen      OUTPATIENT MEDICATIONS:  New Prescriptions    No medications on file

## 2020-09-08 ENCOUNTER — PATIENT MESSAGE (OUTPATIENT)
Dept: MEDICAL GROUP | Facility: MEDICAL CENTER | Age: 75
End: 2020-09-08

## 2020-09-08 DIAGNOSIS — F51.01 PRIMARY INSOMNIA: ICD-10-CM

## 2020-09-08 RX ORDER — TADALAFIL 5 MG/1
5 TABLET ORAL DAILY
Qty: 90 TAB | Refills: 3 | Status: SHIPPED | OUTPATIENT
Start: 2020-09-08

## 2020-09-08 RX ORDER — ASPIRIN AND DIPYRIDAMOLE 25; 200 MG/1; MG/1
1 CAPSULE, EXTENDED RELEASE ORAL 2 TIMES DAILY
Qty: 180 CAP | Refills: 3 | Status: SHIPPED | OUTPATIENT
Start: 2020-09-08

## 2020-09-09 ENCOUNTER — HOSPITAL ENCOUNTER (OUTPATIENT)
Dept: RADIOLOGY | Facility: MEDICAL CENTER | Age: 75
End: 2020-09-09
Attending: INTERNAL MEDICINE
Payer: MEDICARE

## 2020-09-09 DIAGNOSIS — C25.2 MALIGNANT NEOPLASM OF TAIL OF PANCREAS (HCC): ICD-10-CM

## 2020-09-09 PROCEDURE — 700117 HCHG RX CONTRAST REV CODE 255: Performed by: INTERNAL MEDICINE

## 2020-09-09 PROCEDURE — 71260 CT THORAX DX C+: CPT

## 2020-09-09 RX ADMIN — IOHEXOL 75 ML: 350 INJECTION, SOLUTION INTRAVENOUS at 13:51

## 2020-09-11 ENCOUNTER — TELEPHONE (OUTPATIENT)
Dept: NUTRITION | Facility: MEDICAL CENTER | Age: 75
End: 2020-09-11

## 2020-09-11 NOTE — TELEPHONE ENCOUNTER
Nutrition Services:  Patient returned call and said that he is proceeding with treatment at [facility] and that he will no longer be a patient at [facility].  He notes he does not need our services.  Encouraged patient to follow-up with RD through [facility] and to call us should his plans change.     RD available PRN per department policy.

## 2020-09-11 NOTE — TELEPHONE ENCOUNTER
Nutrition Services:  Voice mail received from patient requesting nutrition information for weight loss.  I called him today for follow-up however he did not answer.  Left message and requested he call us as needed.    RD will attempt follow-up next week.  Please have patient call as needed  [phone]

## 2020-09-14 RX ORDER — ZOLPIDEM TARTRATE 10 MG/1
5 TABLET ORAL NIGHTLY PRN
Qty: 45 TAB | Refills: 1 | Status: SHIPPED | OUTPATIENT
Start: 2020-09-14 | End: 2020-12-13

## 2020-09-16 ENCOUNTER — APPOINTMENT (OUTPATIENT)
Dept: ONCOLOGY | Facility: MEDICAL CENTER | Age: 75
End: 2020-09-16
Attending: INTERNAL MEDICINE
Payer: MEDICARE

## 2020-09-22 ENCOUNTER — HOSPITAL ENCOUNTER (OUTPATIENT)
Dept: RADIOLOGY | Facility: MEDICAL CENTER | Age: 75
End: 2020-09-22
Attending: NEUROLOGICAL SURGERY
Payer: MEDICARE

## 2020-09-22 DIAGNOSIS — Q28.8 OTHER SPECIFIED CONGENITAL MALFORMATIONS OF CIRCULATORY SYSTEM: ICD-10-CM

## 2020-09-22 PROCEDURE — 70496 CT ANGIOGRAPHY HEAD: CPT

## 2020-09-22 PROCEDURE — 70498 CT ANGIOGRAPHY NECK: CPT

## 2020-09-22 PROCEDURE — 700117 HCHG RX CONTRAST REV CODE 255: Performed by: NEUROLOGICAL SURGERY

## 2020-09-22 RX ADMIN — IOHEXOL 80 ML: 350 INJECTION, SOLUTION INTRAVENOUS at 15:43

## 2020-09-25 ENCOUNTER — APPOINTMENT (OUTPATIENT)
Dept: RADIOLOGY | Facility: MEDICAL CENTER | Age: 75
End: 2020-09-25
Attending: NEUROLOGICAL SURGERY
Payer: MEDICARE

## 2020-10-01 ENCOUNTER — PATIENT OUTREACH (OUTPATIENT)
Dept: OTHER | Facility: MEDICAL CENTER | Age: 75
End: 2020-10-01

## 2020-10-01 NOTE — PROGRESS NOTES
Oncology nurse Navigator reached out to patient to follow up.  Patient reports that he received his first chemotherapy treatment on the 28th of September.  He reports that today he feels great.  He reports that he has gained weight and has hired a caregiver that is able to help him and prepare his meals.  The patient will be followed at [facility].

## 2020-10-12 ENCOUNTER — PATIENT MESSAGE (OUTPATIENT)
Dept: MEDICAL GROUP | Facility: MEDICAL CENTER | Age: 75
End: 2020-10-12

## 2020-10-13 NOTE — TELEPHONE ENCOUNTER
From: Martin Briceño  To: Vasu Zamudio M.D.  Sent: 10/12/2020 5:05 PM PDT  Subject: Procedure Question    Hi Dr. Zamudio,  I completed my second infusion at the cancer center in [place] today. All is going well.  I will be completing another session on 10/26 & 28. I would like to try to get the two boosters I have scheduled with your office on the 27th done in [place] on the 28th while I am there. I will need to see if that can be done, but would like to advise them of the two boosters I need. Could you provide me that information? ( Meningococal B and ACWY?)  If I can make the arrangement, I will of course cancel my appointment with your office.  Thank you. Hope all is well.  Tex

## 2020-11-18 ENCOUNTER — TELEMEDICINE (OUTPATIENT)
Dept: MEDICAL GROUP | Facility: MEDICAL CENTER | Age: 75
End: 2020-11-18
Payer: MEDICARE

## 2020-11-18 VITALS — BODY MASS INDEX: 23.52 KG/M2 | WEIGHT: 168 LBS | HEIGHT: 71 IN

## 2020-11-18 DIAGNOSIS — K64.8 OTHER HEMORRHOIDS: ICD-10-CM

## 2020-11-18 DIAGNOSIS — Q89.01 ASPLENIA: ICD-10-CM

## 2020-11-18 DIAGNOSIS — C25.2 MALIGNANT NEOPLASM OF TAIL OF PANCREAS (HCC): ICD-10-CM

## 2020-11-18 PROBLEM — D73.9: Status: ACTIVE | Noted: 2020-11-18

## 2020-11-18 PROCEDURE — 99214 OFFICE O/P EST MOD 30 MIN: CPT | Mod: 95,CR | Performed by: FAMILY MEDICINE

## 2020-11-18 RX ORDER — NITROGLYCERIN 4 MG/G
1 OINTMENT RECTAL 2 TIMES DAILY
Qty: 30 G | Refills: 2 | Status: SHIPPED | OUTPATIENT
Start: 2020-11-18

## 2020-11-18 RX ORDER — HYDROCORTISONE ACETATE SUPPOSITORY 30 MG/1
30 SUPPOSITORY RECTAL
Qty: 28 SUPPOSITORY | Refills: 2 | Status: SHIPPED
Start: 2020-11-18 | End: 2020-11-25

## 2020-11-18 ASSESSMENT — FIBROSIS 4 INDEX: FIB4 SCORE: 1.47

## 2020-11-18 NOTE — PROGRESS NOTES
Telemedicine Visit: Established Patient     This encounter was conducted via Zoom.   Verbal consent was obtained. Patient's identity was verified.    Subjective:   CC: Asplenia, hemorrhoids  aMrtin Briceño is a 75 y.o. male presenting for evaluation and management of:    Asplenia  Patient is getting vaccinations through [facility]. He is needing 2 more Meninginitis B vaccinations. Then he will need Pneumovax 23 and Meningitis A vaccine every 5 years.    Other hemorrhoids  Patient is having significant loose stools because of pancreatic insufficiencies.  He was post to have surgery for hemorrhoids but unfortunately he need to prioritize chemotherapy for pancreatic cancer.  Hemorrhoids are bothersome and painful.  He has been using Preparation H and doing a warm sitz bath's at night.  He states that his baths are helpful and Preparation H is helpful but he is looking for alternative treatments and so he does not overuse Preparation H.    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        ROS   Denies any recent fevers or chills. No chest pains or shortness of breath.     Allergies   Allergen Reactions   • Mirtazapine Unspecified     Psychological issues   • Norco [Hydrocodone-Acetaminophen] Shortness of Breath and Swelling   • Tape Itching     Paper tape ok   • Tapentadol Itching     Paper tape ok       Current medicines (including changes today)  Current Outpatient Medications   Medication Sig Dispense Refill   • Nitroglycerin 0.4 % Ointment Insert 1 g into the rectum 2 Times a Day. 30 g 2   • HYDROCORTISONE ACE, RECTAL, 30 MG Suppos Insert 1 Suppository into the rectum 1 time daily as needed (hemorrhoids). 28 Suppository 2   • zolpidem (AMBIEN) 10 MG Tab Take 0.5 Tabs by mouth at bedtime as needed for Sleep for up to 90 days. 45 Tab 1   • tadalafil (CIALIS) 5 MG tablet Take 1 Tab by mouth every day. 90 Tab 3   •  aspirin-dipyridamole (AGGRENOX)  MG CAPSULE SR 12 HR Take 1 Cap by mouth 2 times a day. 180 Cap 3   • levothyroxine (SYNTHROID) 125 MCG Tab Take 1 Tab by mouth Every morning on an empty stomach. 90 Tab 3   • atorvastatin (LIPITOR) 20 MG Tab Take 1 Tab by mouth every day. 90 Tab 3   • hydroxychloroquine (PLAQUENIL) 200 MG Tab TAKE 1 TABLET BY MOUTH TWICE A  Tab 3   • atorvastatin (LIPITOR) 10 MG Tab Take 1 Tab by mouth every morning. For 30 mg total 90 Tab 3   • albuterol 108 (90 Base) MCG/ACT Aero Soln inhalation aerosol Inhale 2 Puffs by mouth every four hours as needed for Shortness of Breath. 48 g 3   • azelastine (ASTELIN) 137 MCG/SPRAY nasal spray Fannin 1 Spray in nose 2 times a day as needed for Rhinitis. 90 mL 3   • liothyronine (CYTOMEL) 5 MCG Tab Take 1 Tab by mouth every day. 90 Tab 3   • omeprazole (PRILOSEC) 40 MG delayed-release capsule Take 1 Cap by mouth 2 times a day. 90 Cap 3   • temazepam (RESTORIL) 15 MG Cap Take 1 Cap by mouth at bedtime as needed for Sleep for up to 90 days. 90 Cap 3   • promethazine (PHENERGAN) 25 MG Tab Take 0.5 Tabs by mouth every 6 hours as needed. 30 Tab 0   • Pancrelipase, Lip-Prot-Amyl, (CREON) 75357 units Cap DR Particles Take 1-2 Caps by mouth 5 Times a Day. Take 2 caps for meals and 1 cap for snacks 270 Cap 3   • CEQUA 0.09 % Solution Place 1 Drop in both eyes 2 Times a Day.     • Turmeric 500 MG Cap Take  by mouth 2 Times a Day.     • calcium carbonate (TUMS) 500 MG Chew Tab Take 500-1,000 mg by mouth as needed.     • Non Formulary Request 2 Times a Day. HYDOEYE FOR DRY EYES      • Ascorbic Acid (VITAMIN C) 1000 MG Tab Take 1,000 mg by mouth.     • Lifitegrast (XIIDRA) 5 % Solution Place 1 Drop in both eyes 2 Times a Day. Indications: Dry Eyes caused by Deficiency of Tears     • fluticasone (FLONASE) 50 MCG/ACT nasal spray Spray 1 Spray in nose as needed. Indications: Signs and Symptoms of Nose Diseases     • Bepotastine Besilate (BEPREVE) 1.5 % Solution  Place 1 Drop in both eyes as needed (seasonal allergies). Seasonal eye drops for dry eyes     • Cyanocobalamin (B-12) 1000 MCG Cap Take  by mouth.     • cholecalciferol (D-3-5) 5000 UNIT Cap Take 5,000 Units by mouth every day.     • Coenzyme Q10 (COQ10) 400 MG Cap Take  by mouth 2 Times a Day.       No current facility-administered medications for this visit.        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   • Plantar wart of left foot 07/21/2020   • Nasal congestion 07/21/2020   • Chronic nonintractable headache 07/21/2020   • Elevated testosterone level in male 05/14/2020   • Anemia of chronic disease 03/31/2020   • Essential hypertension 01/27/2020   • Pure hypercholesterolemia 01/27/2020   • Primary insomnia 01/27/2020   • Gastroesophageal reflux disease without esophagitis 01/27/2020   • Seasonal allergies 01/27/2020   • Left lower quadrant abdominal pain 01/27/2020   • Chronic left shoulder pain 01/27/2020   • Long-term use of Plaquenil 07/29/2019   • Hypothyroidism 11/05/2018   • Hyperparathyroidism ([facility]) 11/05/2018   • Fatigue 11/05/2018   • Sjogren's syndrome ([facility]) 09/21/2018       Family History   Problem Relation Age of Onset   • COPD Mother    • Heart Disease Father         A. Fib       He  has a past medical history of Adverse effect of anesthesia, Anemia, Anesthesia, Bowel habit changes, Cancer ([facility]) (08/11/2020), Cataract, Environmental and seasonal allergies, GERD (gastroesophageal reflux disease), High cholesterol, History of BPH, Hypertension, Pancreatic mass, Post-nasal drip, Sjogren's syndrome (HCC), Stroke ([facility]) (2007), and Unspecified disorder of  "thyroid.  He  has a past surgical history that includes gastroscopy with biopsy (6/11/08); egd w/endoscopic ultrasound (6/11/08); thyroidectomy (2003); shoulder arthroscopy (Right, 2015); hernia repair; inguinal hernia laparoscopic (Left, 2016); pr shldr arthroscop,surg,w/rotat cuff repr (Left, 6/1/2020); pr shldr arthroscop,part acromioplas (Left, 6/1/2020); pr endoscopic us exam, esoph (8/5/2020); gastroscopy-endo (8/5/2020); egd with asp/bx (8/5/2020); pancreatectomy (8/13/2020); splenectomy (8/13/2020); appendectomy (1976); and cath placement (Right, 9/3/2020).       Objective:   Vitals obtained by patient:  Ht 1.791 m (5' 10.5\")   Wt 76.2 kg (168 lb)     Physical Exam:  Constitutional: Alert, no distress, well-groomed.  Skin: No rashes in visible areas.  Eye: Round. Conjunctiva clear, lids normal. No icterus.   ENMT: Lips pink without lesions, good dentition, moist mucous membranes. Phonation normal.  Neck: No masses, no thyromegaly. Moves freely without pain.  CV: Pulse as reported by patient  Respiratory: Unlabored respiratory effort, no cough or audible wheeze  Psych: Alert and oriented x3, normal affect and mood.       Assessment and Plan:   The following treatment plan was discussed:     1. Malignant neoplasm of tail of pancreas (HCC)  Doing well. Continue Chemo.    2. Asplenia  Advised continuing to receive immunizations through [facility] for consistency on meningitis B vaccine.    3. Other hemorrhoids  Uncontrolled.  Advised doing sitz bath's up to 3 times daily.  We will add nitroglycerin and hydrocortisone suppositories.  - Nitroglycerin 0.4 % Ointment; Insert 1 g into the rectum 2 Times a Day.  Dispense: 30 g; Refill: 2  - HYDROCORTISONE ACE, RECTAL, 30 MG Suppos; Insert 1 Suppository into the rectum 1 time daily as needed (hemorrhoids).  Dispense: 28 Suppository; Refill: 2        Follow-up: Return if symptoms worsen or fail to improve.         "

## 2020-11-18 NOTE — ASSESSMENT & PLAN NOTE
Patient is getting vaccinations through [facility]. He is needing 2 more Meninginitis B vaccinations. Then he will need Pneumovax 23 and Meningitis A vaccine every 5 years.

## 2020-11-18 NOTE — ASSESSMENT & PLAN NOTE
Patient is having significant loose stools because of pancreatic insufficiencies.  He was post to have surgery for hemorrhoids but unfortunately he need to prioritize chemotherapy for pancreatic cancer.  Hemorrhoids are bothersome and painful.  He has been using Preparation H and doing a warm sitz bath's at night.  He states that his baths are helpful and Preparation H is helpful but he is looking for alternative treatments and so he does not overuse Preparation H.

## 2020-11-24 ENCOUNTER — PATIENT MESSAGE (OUTPATIENT)
Dept: MEDICAL GROUP | Facility: MEDICAL CENTER | Age: 75
End: 2020-11-24

## 2020-11-24 RX ORDER — OMEPRAZOLE 40 MG/1
40 CAPSULE, DELAYED RELEASE ORAL 2 TIMES DAILY
Qty: 180 CAP | Refills: 3 | Status: SHIPPED | OUTPATIENT
Start: 2020-11-24 | End: 2021-02-08 | Stop reason: SDUPTHER

## 2020-11-24 NOTE — TELEPHONE ENCOUNTER
From: Martin Briceño  To: Vasu Zamudio M.D.  Sent: 11/24/2020 9:00 AM [facility]  Subject: Prescription Question    Good morning Dr. layton,  I received my final refill on Omeprazole DR 40mg capsule. I inadvertently noticed the the order is for 1/day.  Because of my stomach issues of the past many years, I have been taking 2/day. One in morning and one at bedtime. This was recommend by both my GI in [facility] and Dr. Wolf over at [facility].  Knowing you will likely be in the holiday and transitioning in the second of December and into early January,  I wanted to see if I could be written a new 12month/90day/2 per day prescription. To B[facility].   Incidentally, I will have my second and final meningitis injection on the 9th.  I wish you as happy a Thanksgiving as possible, and a very Merry [place].  See you next year.  Tex

## 2020-11-25 RX ORDER — HYDROCORTISONE ACETATE 25 MG/1
25 SUPPOSITORY RECTAL EVERY 12 HOURS
Qty: 24 SUPPOSITORY | Refills: 3 | Status: SHIPPED | OUTPATIENT
Start: 2020-11-25

## 2020-11-25 NOTE — PATIENT COMMUNICATION
[facility] unable to dispense Nitroglycerin Ointment in 0.4% concentration. They can do 0.125% or 2%. Please advise and e-scribe if one of those alternatives are accepted.

## 2020-11-27 ENCOUNTER — PATIENT MESSAGE (OUTPATIENT)
Dept: MEDICAL GROUP | Facility: MEDICAL CENTER | Age: 75
End: 2020-11-27

## 2020-11-27 DIAGNOSIS — C25.2 MALIGNANT NEOPLASM OF TAIL OF PANCREAS (HCC): ICD-10-CM

## 2020-11-27 DIAGNOSIS — K86.89 PANCREATIC INSUFFICIENCY: ICD-10-CM

## 2020-11-30 RX ORDER — PANCRELIPASE 36000; 180000; 114000 [USP'U]/1; [USP'U]/1; [USP'U]/1
2 CAPSULE, DELAYED RELEASE PELLETS ORAL
Qty: 300 CAP | Refills: 11 | Status: SHIPPED | OUTPATIENT
Start: 2020-11-30

## 2021-01-15 DIAGNOSIS — Z23 NEED FOR VACCINATION: ICD-10-CM

## 2021-02-08 RX ORDER — OMEPRAZOLE 40 MG/1
40 CAPSULE, DELAYED RELEASE ORAL 2 TIMES DAILY
Qty: 180 CAP | Refills: 0 | Status: SHIPPED | OUTPATIENT
Start: 2021-02-08

## 2021-04-08 ENCOUNTER — TELEPHONE (OUTPATIENT)
Dept: RHEUMATOLOGY | Facility: MEDICAL CENTER | Age: 76
End: 2021-04-08

## 2021-04-08 NOTE — TELEPHONE ENCOUNTER
Please notify patient that if he is not having symptoms then we can postpone his follow-up.  Looks like patient is getting reevaluated for a recurrence of pancreatic cancer.    Please postpone patient's appointment for 6 months, do schedule to have a follow-up in 6 months, we can always see patient sooner if needed.

## 2021-04-08 NOTE — TELEPHONE ENCOUNTER
----- Message from René Patiño, Med Ass't sent at 4/7/2021  3:59 PM PDT -----  Regarding: FW: Non-Urgent Medical Question  Contact: [phone]    ----- Message -----  From: Martin Briceño  Sent: 4/7/2021   3:13 PM PDT  To: Rheumatology [facility]  Subject: Non-Urgent Medical Question                      Hello,    I am scheduled to meet Dr. Rodríguez on the 26th, and am wondering if that is necessary.  Other than dry eye syndrome, I have not been experiencing any symptoms associated with Sjogren's that I am aware of.  Also, I was diagnosed with pancreatic cancer in August 2020, and received 6 months of chemo. Unfortunately, a recent scan suggests that it may have metastasized to my liver.  I am aware of the prognosis.     Thank you,  Tex

## 2021-04-23 ENCOUNTER — HOSPITAL ENCOUNTER (OUTPATIENT)
Dept: LAB | Facility: MEDICAL CENTER | Age: 76
End: 2021-04-23
Attending: UROLOGY
Payer: MEDICARE

## 2021-04-23 LAB
ANION GAP SERPL CALC-SCNC: 10 MMOL/L (ref 7–16)
BUN SERPL-MCNC: 16 MG/DL (ref 8–22)
CALCIUM SERPL-MCNC: 9.3 MG/DL (ref 8.4–10.2)
CHLORIDE SERPL-SCNC: 101 MMOL/L (ref 96–112)
CO2 SERPL-SCNC: 26 MMOL/L (ref 20–33)
CREAT SERPL-MCNC: 0.86 MG/DL (ref 0.5–1.4)
GLUCOSE SERPL-MCNC: 103 MG/DL (ref 65–99)
POTASSIUM SERPL-SCNC: 4 MMOL/L (ref 3.6–5.5)
SODIUM SERPL-SCNC: 137 MMOL/L (ref 135–145)

## 2021-04-23 PROCEDURE — 84153 ASSAY OF PSA TOTAL: CPT | Mod: GA

## 2021-04-23 PROCEDURE — 80048 BASIC METABOLIC PNL TOTAL CA: CPT

## 2021-04-23 PROCEDURE — 36415 COLL VENOUS BLD VENIPUNCTURE: CPT

## 2021-04-24 LAB — PSA SERPL-MCNC: 1.11 NG/ML (ref 0–4)

## 2021-06-24 ENCOUNTER — HOSPITAL ENCOUNTER (EMERGENCY)
Facility: MEDICAL CENTER | Age: 76
End: 2021-06-24
Attending: EMERGENCY MEDICINE
Payer: MEDICARE

## 2021-06-24 ENCOUNTER — APPOINTMENT (OUTPATIENT)
Dept: RADIOLOGY | Facility: MEDICAL CENTER | Age: 76
End: 2021-06-24
Attending: EMERGENCY MEDICINE
Payer: MEDICARE

## 2021-06-24 VITALS
BODY MASS INDEX: 26.57 KG/M2 | TEMPERATURE: 98.8 F | RESPIRATION RATE: 16 BRPM | OXYGEN SATURATION: 93 % | HEART RATE: 67 BPM | HEIGHT: 70 IN | WEIGHT: 185.63 LBS | DIASTOLIC BLOOD PRESSURE: 70 MMHG | SYSTOLIC BLOOD PRESSURE: 119 MMHG

## 2021-06-24 DIAGNOSIS — C25.1 MALIGNANT NEOPLASM OF BODY OF PANCREAS (HCC): ICD-10-CM

## 2021-06-24 DIAGNOSIS — R50.81 FEVER IN OTHER DISEASES: ICD-10-CM

## 2021-06-24 DIAGNOSIS — R05.9 COUGH: ICD-10-CM

## 2021-06-24 LAB
ALBUMIN SERPL BCP-MCNC: 4.1 G/DL (ref 3.2–4.9)
ALBUMIN/GLOB SERPL: 1.2 G/DL
ALP SERPL-CCNC: 153 U/L (ref 30–99)
ALT SERPL-CCNC: 16 U/L (ref 2–50)
ANION GAP SERPL CALC-SCNC: 14 MMOL/L (ref 7–16)
APPEARANCE UR: CLEAR
APTT PPP: 32.6 SEC (ref 24.7–36)
AST SERPL-CCNC: 17 U/L (ref 12–45)
BASOPHILS # BLD AUTO: 0.7 % (ref 0–1.8)
BASOPHILS # BLD: 0.06 K/UL (ref 0–0.12)
BILIRUB SERPL-MCNC: 0.3 MG/DL (ref 0.1–1.5)
BILIRUB UR QL STRIP.AUTO: NEGATIVE
BUN SERPL-MCNC: 10 MG/DL (ref 8–22)
CALCIUM SERPL-MCNC: 8.8 MG/DL (ref 8.4–10.2)
CHLORIDE SERPL-SCNC: 96 MMOL/L (ref 96–112)
CO2 SERPL-SCNC: 22 MMOL/L (ref 20–33)
COLOR UR: YELLOW
CREAT SERPL-MCNC: 0.91 MG/DL (ref 0.5–1.4)
EOSINOPHIL # BLD AUTO: 0.74 K/UL (ref 0–0.51)
EOSINOPHIL NFR BLD: 8.1 % (ref 0–6.9)
ERYTHROCYTE [DISTWIDTH] IN BLOOD BY AUTOMATED COUNT: 51.8 FL (ref 35.9–50)
GLOBULIN SER CALC-MCNC: 3.5 G/DL (ref 1.9–3.5)
GLUCOSE SERPL-MCNC: 115 MG/DL (ref 65–99)
GLUCOSE UR STRIP.AUTO-MCNC: NEGATIVE MG/DL
HCT VFR BLD AUTO: 34 % (ref 42–52)
HGB BLD-MCNC: 11.6 G/DL (ref 14–18)
IMM GRANULOCYTES # BLD AUTO: 0.05 K/UL (ref 0–0.11)
IMM GRANULOCYTES NFR BLD AUTO: 0.5 % (ref 0–0.9)
INR PPP: 1.09 (ref 0.87–1.13)
KETONES UR STRIP.AUTO-MCNC: ABNORMAL MG/DL
LACTATE BLD-SCNC: 2 MMOL/L (ref 0.5–2)
LEUKOCYTE ESTERASE UR QL STRIP.AUTO: NEGATIVE
LYMPHOCYTES # BLD AUTO: 1.91 K/UL (ref 1–4.8)
LYMPHOCYTES NFR BLD: 20.9 % (ref 22–41)
MCH RBC QN AUTO: 35 PG (ref 27–33)
MCHC RBC AUTO-ENTMCNC: 34.1 G/DL (ref 33.7–35.3)
MCV RBC AUTO: 102.7 FL (ref 81.4–97.8)
MICRO URNS: ABNORMAL
MONOCYTES # BLD AUTO: 1.86 K/UL (ref 0–0.85)
MONOCYTES NFR BLD AUTO: 20.4 % (ref 0–13.4)
NEUTROPHILS # BLD AUTO: 4.52 K/UL (ref 1.82–7.42)
NEUTROPHILS NFR BLD: 49.4 % (ref 44–72)
NITRITE UR QL STRIP.AUTO: NEGATIVE
NRBC # BLD AUTO: 0.03 K/UL
NRBC BLD-RTO: 0.3 /100 WBC
PH UR STRIP.AUTO: 5 [PH] (ref 5–8)
PLATELET # BLD AUTO: 301 K/UL (ref 164–446)
PMV BLD AUTO: 9.3 FL (ref 9–12.9)
POTASSIUM SERPL-SCNC: 4.3 MMOL/L (ref 3.6–5.5)
PROT SERPL-MCNC: 7.6 G/DL (ref 6–8.2)
PROT UR QL STRIP: NEGATIVE MG/DL
PROTHROMBIN TIME: 13.8 SEC (ref 12–14.6)
RBC # BLD AUTO: 3.31 M/UL (ref 4.7–6.1)
RBC UR QL AUTO: NEGATIVE
SODIUM SERPL-SCNC: 132 MMOL/L (ref 135–145)
SP GR UR STRIP.AUTO: >=1.03
WBC # BLD AUTO: 9.1 K/UL (ref 4.8–10.8)

## 2021-06-24 PROCEDURE — 96366 THER/PROPH/DIAG IV INF ADDON: CPT

## 2021-06-24 PROCEDURE — A9270 NON-COVERED ITEM OR SERVICE: HCPCS | Performed by: EMERGENCY MEDICINE

## 2021-06-24 PROCEDURE — 700111 HCHG RX REV CODE 636 W/ 250 OVERRIDE (IP): Performed by: EMERGENCY MEDICINE

## 2021-06-24 PROCEDURE — 96365 THER/PROPH/DIAG IV INF INIT: CPT

## 2021-06-24 PROCEDURE — 85025 COMPLETE CBC W/AUTO DIFF WBC: CPT

## 2021-06-24 PROCEDURE — 700105 HCHG RX REV CODE 258: Performed by: EMERGENCY MEDICINE

## 2021-06-24 PROCEDURE — 87086 URINE CULTURE/COLONY COUNT: CPT

## 2021-06-24 PROCEDURE — 80053 COMPREHEN METABOLIC PANEL: CPT

## 2021-06-24 PROCEDURE — 96367 TX/PROPH/DG ADDL SEQ IV INF: CPT

## 2021-06-24 PROCEDURE — 85610 PROTHROMBIN TIME: CPT

## 2021-06-24 PROCEDURE — 81003 URINALYSIS AUTO W/O SCOPE: CPT

## 2021-06-24 PROCEDURE — 36415 COLL VENOUS BLD VENIPUNCTURE: CPT

## 2021-06-24 PROCEDURE — 85730 THROMBOPLASTIN TIME PARTIAL: CPT

## 2021-06-24 PROCEDURE — 700102 HCHG RX REV CODE 250 W/ 637 OVERRIDE(OP): Performed by: EMERGENCY MEDICINE

## 2021-06-24 PROCEDURE — 99285 EMERGENCY DEPT VISIT HI MDM: CPT

## 2021-06-24 PROCEDURE — 83605 ASSAY OF LACTIC ACID: CPT

## 2021-06-24 PROCEDURE — 87040 BLOOD CULTURE FOR BACTERIA: CPT | Mod: 91

## 2021-06-24 PROCEDURE — 71045 X-RAY EXAM CHEST 1 VIEW: CPT

## 2021-06-24 RX ORDER — AMOXICILLIN AND CLAVULANATE POTASSIUM 875; 125 MG/1; MG/1
1 TABLET, FILM COATED ORAL 2 TIMES DAILY
Qty: 14 TABLET | Refills: 0 | Status: SHIPPED | OUTPATIENT
Start: 2021-06-24 | End: 2021-07-01

## 2021-06-24 RX ORDER — ACETAMINOPHEN 325 MG/1
650 TABLET ORAL ONCE
Status: COMPLETED | OUTPATIENT
Start: 2021-06-24 | End: 2021-06-24

## 2021-06-24 RX ADMIN — CEFEPIME 2 G: 2 INJECTION, POWDER, FOR SOLUTION INTRAVENOUS at 17:03

## 2021-06-24 RX ADMIN — ACETAMINOPHEN 650 MG: 325 TABLET, FILM COATED ORAL at 18:11

## 2021-06-24 RX ADMIN — VANCOMYCIN HYDROCHLORIDE 2000 MG: 500 INJECTION, POWDER, LYOPHILIZED, FOR SOLUTION INTRAVENOUS at 18:01

## 2021-06-24 ASSESSMENT — FIBROSIS 4 INDEX: FIB4 SCORE: 1.49

## 2021-06-24 NOTE — ED PROVIDER NOTES
ED Provider Note    CHIEF COMPLAINT  Chief Complaint   Patient presents with   • Fever   • Cough   • Constipation       HPI  Martin Briceño is a 76 y.o. 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  He is currently undergoing chemotherapy, he presents to the emergency department with a cough and fever that started this morning.  He is febrile here on arrival to 101.4.  The patient is also status post splenectomy.    REVIEW OF SYSTEMS  As above, all other systems negative.  PAST MEDICAL HISTORY   has a past medical history of Adverse effect of anesthesia, Anemia, Anesthesia, Bowel habit changes, Cancer (HCC) (2020), Cataract, Environmental and seasonal allergies, GERD (gastroesophageal reflux disease), High cholesterol, History of BPH, Hypertension, Pancreatic mass, Post-nasal drip, Sjogren's syndrome (HCC), Stroke (HCC) (), and Unspecified disorder of thyroid.    SOCIAL HISTORY  Social History     Tobacco Use   • Smoking status: Former Smoker     Packs/day: 1.00     Years: 30.00     Pack years: 30.00     Types: Cigarettes     Start date:      Quit date:      Years since quittin.4   • Smokeless tobacco: Never Used   Vaping Use   • Vaping Use: Never used   Substance and Sexual Activity   • Alcohol use: Not Currently   • Drug use: Yes     Types: Marijuana, Inhaled     Comment: estela for sleep/ THC/CBD   • Sexual activity: Not Currently       SURGICAL HISTORY   has a past surgical history that includes gastroscopy with biopsy (08); egd w/endoscopic ultrasound (08); thyroidectomy (); shoulder arthroscopy (Right, 2015); hernia repair; inguinal hernia laparoscopic (Left, 2016); shldr arthroscop,surg,w/rotat cuff repr (Left, 2020); shldr arthroscop,part acromioplas (Left, 2020); endoscopic us exam, ramsey  "(8/5/2020); gastroscopy-endo (8/5/2020); egd with asp/bx (8/5/2020); pancreatectomy (8/13/2020); splenectomy (8/13/2020); appendectomy (1976); and cath placement (Right, 9/3/2020).    CURRENT MEDICATIONS  Reviewed.  See Encounter Summary.     ALLERGIES  Allergies   Allergen Reactions   • Mirtazapine Unspecified     Psychological issues   • Norco [Hydrocodone-Acetaminophen] Shortness of Breath and Swelling   • Tape Itching     Paper tape ok   • Tapentadol Itching     Paper tape ok       PHYSICAL EXAM  VITAL SIGNS: /69   Pulse 69   Temp 37.7 °C (99.9 °F) (Temporal)   Resp 18   Ht 1.778 m (5' 10\")   Wt 84.2 kg (185 lb 10 oz)   SpO2 93%   BMI 26.63 kg/m²   Constitutional: Pleasant, febrile alert in no apparent distress.  HENT: Normocephalic, Bilateral external ears normal. Nose normal.   Eyes: Pupils are equal and reactive. Conjunctiva normal, non-icteric.   Thorax & Lungs: Easy unlabored respirations  Abdomen:  No gross signs of peritonitis, no pain with movement   Skin: Visualized skin is  Dry, No erythema, No rash.   Extremities:   No edema, No asymmetry  Neurologic: Alert, Grossly non-focal.   Psychiatric: Affect and Mood normal      COURSE & MEDICAL DECISION MAKING  Nursing notes and vital signs were reviewed. (See chart for details)    The patient presents to the Emergency Department with fever cough on chemotherapy concern for fever neutropenia he is also immunocompromised from prior splenectomy.  He was started on IV cefepime and IV vancomycin empirically.  Lactic acid has been ordered from sepsis protocol he is not hypotensive.  He is not tachycardic.    DX-CHEST-PORTABLE (1 VIEW)   Final Result      No acute cardiopulmonary abnormality identified.        Results for orders placed or performed during the hospital encounter of 06/24/21   CBC with Differential   Result Value Ref Range    WBC 9.1 4.8 - 10.8 K/uL    RBC 3.31 (L) 4.70 - 6.10 M/uL    Hemoglobin 11.6 (L) 14.0 - 18.0 g/dL    Hematocrit " 34.0 (L) 42.0 - 52.0 %    .7 (H) 81.4 - 97.8 fL    MCH 35.0 (H) 27.0 - 33.0 pg    MCHC 34.1 33.7 - 35.3 g/dL    RDW 51.8 (H) 35.9 - 50.0 fL    Platelet Count 301 164 - 446 K/uL    MPV 9.3 9.0 - 12.9 fL    Neutrophils-Polys 49.40 44.00 - 72.00 %    Lymphocytes 20.90 (L) 22.00 - 41.00 %    Monocytes 20.40 (H) 0.00 - 13.40 %    Eosinophils 8.10 (H) 0.00 - 6.90 %    Basophils 0.70 0.00 - 1.80 %    Immature Granulocytes 0.50 0.00 - 0.90 %    Nucleated RBC 0.30 /100 WBC    Neutrophils (Absolute) 4.52 1.82 - 7.42 K/uL    Lymphs (Absolute) 1.91 1.00 - 4.80 K/uL    Monos (Absolute) 1.86 (H) 0.00 - 0.85 K/uL    Eos (Absolute) 0.74 (H) 0.00 - 0.51 K/uL    Baso (Absolute) 0.06 0.00 - 0.12 K/uL    Immature Granulocytes (abs) 0.05 0.00 - 0.11 K/uL    NRBC (Absolute) 0.03 K/uL   Comp Metabolic Panel (CMP)   Result Value Ref Range    Sodium 132 (L) 135 - 145 mmol/L    Potassium 4.3 3.6 - 5.5 mmol/L    Chloride 96 96 - 112 mmol/L    Co2 22 20 - 33 mmol/L    Anion Gap 14.0 7.0 - 16.0    Glucose 115 (H) 65 - 99 mg/dL    Bun 10 8 - 22 mg/dL    Creatinine 0.91 0.50 - 1.40 mg/dL    Calcium 8.8 8.4 - 10.2 mg/dL    AST(SGOT) 17 12 - 45 U/L    ALT(SGPT) 16 2 - 50 U/L    Alkaline Phosphatase 153 (H) 30 - 99 U/L    Total Bilirubin 0.3 0.1 - 1.5 mg/dL    Albumin 4.1 3.2 - 4.9 g/dL    Total Protein 7.6 6.0 - 8.2 g/dL    Globulin 3.5 1.9 - 3.5 g/dL    A-G Ratio 1.2 g/dL   Prothrombin Time (INR)   Result Value Ref Range    PT 13.8 12.0 - 14.6 sec    INR 1.09 0.87 - 1.13   APTT (PTT)   Result Value Ref Range    APTT 32.6 24.7 - 36.0 sec   Urinalysis    Specimen: Urine, Clean Catch   Result Value Ref Range    Color Yellow     Character Clear     Specific Gravity >=1.030 <1.035    Ph 5.0 5.0 - 8.0    Glucose Negative Negative mg/dL    Ketones Trace (A) Negative mg/dL    Protein Negative Negative mg/dL    Bilirubin Negative Negative    Nitrite Negative Negative    Leukocyte Esterase Negative Negative    Occult Blood Negative Negative    Micro  Urine Req see below    LACTIC ACID   Result Value Ref Range    Lactic Acid 2.0 0.5 - 2.0 mmol/L   ESTIMATED GFR   Result Value Ref Range    GFR If African American >60 >60 mL/min/1.73 m 2    GFR If Non African American >60 >60 mL/min/1.73 m 2     Patient has no obvious source at this time he has had blood cultures and urine culture sent I have spoken with Dr. GEORGE MARIA who is the oncologist who is on-call for his oncologist in [place].  She and I agree that the patient is safe to be discharged home she will follow up on the patient tomorrow to see how he is feeling.  He has received IV antibiotics here and she is recommended discharge on Augmentin.  If he has any new or worsening symptoms to return to the ER  Discharge Medications:    .  DISPOSITION:    Patient will be discharged home in stable condition.    FOLLOW UP:  Dr Telles-  The office will call tomorrow to see how you are doing, you can do your CTs and they want you to start on antibiotic that I will prescribe called Augmentin that you can start tomorrow          OUTPATIENT MEDICATIONS:  New Prescriptions    AMOXICILLIN-CLAVULANATE (AUGMENTIN) 875-125 MG TAB    Take 1 tablet by mouth 2 times a day for 7 days.         FINAL IMPRESSION   1. Cough    2. Fever in other diseases    3. Malignant neoplasm of body of pancreas (HCC)

## 2021-06-24 NOTE — ED TRIAGE NOTES
Pt bib self with c/o new onset cough and fever since this AM. Pt reports he is currently receiving treatment for pancreatic cancer.     Pt has a fever and neutropenic protocol was ordered. Charge RN notified and pt roomed immediately.

## 2021-06-25 NOTE — ED NOTES
1705 Iv placed , labs  bld cx x 2 done drawn and taken to lab. poc update given to pt, results pending at this time  1708 abx infusing as ordered

## 2021-06-25 NOTE — ED NOTES
This RN introduced self to patient. Patient is resting calmly on gurney, A+O x 4, no complaints or questions at this time. IV Vanc currently infusing. Patient updated on plan of care. Safety precautions are in place including gurney locked and in lowest position, call light within reach.

## 2021-06-25 NOTE — ED NOTES
Vancomycin infusing, pt medicated with tylenol for headache.  Pt to be d/c home after abx infusion

## 2021-06-25 NOTE — ED NOTES
Discharge instructions reviewed with patient. AVS signed by patient. PIV removed. Paper prescription handed to patient. Patient understands need for follow-up appointment with healthcare team and to return to ED for worsening symptoms. All questions answered at this time. Patient ambulated to exit with belongings. Patient in stable condition with no signs of distress. Patient agreeable to discharge instructions.

## 2021-06-27 LAB
BACTERIA UR CULT: NORMAL
SIGNIFICANT IND 70042: NORMAL
SITE SITE: NORMAL
SOURCE SOURCE: NORMAL

## 2021-06-29 LAB
BACTERIA BLD CULT: NORMAL
BACTERIA BLD CULT: NORMAL
SIGNIFICANT IND 70042: NORMAL
SIGNIFICANT IND 70042: NORMAL
SITE SITE: NORMAL
SITE SITE: NORMAL
SOURCE SOURCE: NORMAL
SOURCE SOURCE: NORMAL

## 2021-07-01 NOTE — DISCHARGE SUMMARY
HISTORY OF PRESENT ILLNESS:  Patient is a 75-year-old male patient status post   open distal pancreatectomy, splenectomy with node dissection.  He is now   postop day #4, tolerating a general diet, but is having difficulty ambulating   and getting up.  He also has had recent shoulder surgery, difficulty with   self-care, so the patient is being transferred to outside facility for both   skilled nursing care and rehab today.  Patient is being discharged there with   all his medication and he is being given prescription for oxycodone 5 mg p.o.   q. 4 hours p.r.n. pain, along with Celebrex and some Phenergan.  He is now   postop day #4, he was operated on 08/13/2020.  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    PHYSICAL EXAMINATION PRIOR TO DISCHARGE:  HEENT:  Extraocular movements intact.  No sign of jaundice.  NECK:  Soft and supple.  There is no cervical, supraclavicular, or occipital   lymphadenopathy.  LUNGS:  Clear to auscultation, good inspiratory air.  CARDIOVASCULAR:  Regular rate and rhythm.  ABDOMEN:  Soft.  His incision is clean, dry, and intact.  The staples will   also be removed in approximately 10 days.  EXTREMITIES:  No clubbing, cyanosis, or edema.    ASSESSMENT AND PLAN:  Patient will be discharged to the outside facility to   continue his rehabilitation with the aid of nursing care.  He will follow up   in 2 weeks from date of surgery, which is the 08/13, which he should be seeing   me and call for an appointment and will have the staples removed at   approximately 10-14 days as well.  He has been given a prescription to go to   the skilled nursing/rehabilitation center for oxycodone and the rest of his   medications will be transferred there, and he will follow up with me as an   outpatient.       ____________________________________     MD JESSENIA Jefferson / LINDA    DD:  08/17/2020 11:28:08  DT:  08/17/2020  13:11:55    D#:  [id number]  Job#:  [id number]   Epidermal Closure Graft Donor Site (Optional): simple interrupted

## (undated) DEVICE — GLOVE SZ 7.5 BIOGEL PI MICRO - PF LF (50PR/BX)

## (undated) DEVICE — SUTURE 4-0 PROLENE SH 36 (36PK/BX)"

## (undated) DEVICE — MASK WITH FACE SHIELD (25/BX 4BX/CA)

## (undated) DEVICE — BLOCK BITE ENDOSCOPIC 2809 - (100/BX) INTERMEDIATE

## (undated) DEVICE — LIGASURE TISSUE FUSION  - SINGLE USE (6/CA)

## (undated) DEVICE — CANNULA FULLY THREADED 8 X 75 (5EA/BX)

## (undated) DEVICE — PROTECTOR ULNA NERVE - (36PR/CA)

## (undated) DEVICE — DRAPE SHOULDER FLUID CONTROL - 77 X 85 (10/CA)

## (undated) DEVICE — KIT CUSTOM PROCEDURE SINGLE FOR ENDO  (15/CA)

## (undated) DEVICE — SLEEVE SHOULDER DISP(ARTHREX) - (6/BX)

## (undated) DEVICE — TUBING PUMP WITH CONNECTOR REDEUCE (1EA)

## (undated) DEVICE — TUBE CONNECT SUCTION CLEAR 120 X 1/4" (50EA/CA)"

## (undated) DEVICE — SUTURE 3-0 ETHILON FS-1 - (36/BX) 30 INCH

## (undated) DEVICE — ELECTRODE 850 FOAM ADHESIVE - HYDROGEL RADIOTRNSPRNT (50/PK)

## (undated) DEVICE — SPONGE GAUZESTER. 2X2 4-PL - (2/PK 50PK/BX 30BX/CS)

## (undated) DEVICE — SPONGE LAP XR ST 36X36 LF - (1/PK40PK/CA)

## (undated) DEVICE — CHLORAPREP 26 ML APPLICATOR - ORANGE TINT(25/CA)

## (undated) DEVICE — GOWN SURGEONS LARGE - (32/CA)

## (undated) DEVICE — SPONGE GAUZE NON-STERILE 4X4 - (2000/CA 10PK/CA)

## (undated) DEVICE — WATER IRRIGATION STERILE 1000ML (12EA/CA)

## (undated) DEVICE — CATHETER IV SAFETY 22 GA X 1 (50EA/BX)

## (undated) DEVICE — CATHETER IV SAFETY 20 GA X 1-1/4 (50/BX)

## (undated) DEVICE — SYRINGE SAFETY 10 ML 18 GA X 1 1/2 BLUNT LL (100/BX 4BX/CA)

## (undated) DEVICE — LACTATED RINGERS INJ 1000 ML - (14EA/CA 60CA/PF)

## (undated) DEVICE — DRAPE IOBAN II INCISE 23X17 - (10EA/BX 4BX/CA)

## (undated) DEVICE — CLIP LG INTNL HRZN TI ESCP LGT - (20/BX)

## (undated) DEVICE — CANISTER SUCTION RIGID RED 1500CC (40EA/CA)

## (undated) DEVICE — SYRINGE 12 CC LUER TIP - (80/BX) OBSOLETE ITEM

## (undated) DEVICE — TUBE CONNECTING SUCTION - CLEAR PLASTIC STERILE 72 IN (50EA/CA)

## (undated) DEVICE — SENSOR SPO2 NEO LNCS ADHESIVE (20/BX) SEE USER NOTES

## (undated) DEVICE — TUBING CLEARLINK DUO-VENT - C-FLO (48EA/CA)

## (undated) DEVICE — MASK ANESTHESIA ADULT  - (100/CA)

## (undated) DEVICE — GLOVE, LITE (PAIR)

## (undated) DEVICE — STAPLER SKIN DISP - (6/BX 10BX/CA) VISISTAT

## (undated) DEVICE — KIT ANESTHESIA W/CIRCUIT & 3/LT BAG W/FILTER (20EA/CA)

## (undated) DEVICE — CANNULA THREADED 5X75 (5EA/BX)

## (undated) DEVICE — DRAPE C-ARM LARGE 41IN X 74 IN - (10/BX 2BX/CA)

## (undated) DEVICE — GLOVE BIOGEL SZ 8 SURGICAL PF LTX - (50PR/BX 4BX/CA)

## (undated) DEVICE — DECANTER FLD BLS - (50/CA)

## (undated) DEVICE — SET LEADWIRE 5 LEAD BEDSIDE DISPOSABLE ECG (1SET OF 5/EA)

## (undated) DEVICE — DRAPE LARGE 3 QUARTER - (20/CA)

## (undated) DEVICE — SUTURE 3-0 VICRYL PLUS SH - 8X 18 INCH (12/BX)

## (undated) DEVICE — TRAY ANESTHESIA - CONTINUOUS EPIDURAL (10EA/CA) THIS IS A CUSTOM PACK

## (undated) DEVICE — SYRINGE SAFETY 3 ML 18 GA X 1 1/2 BLUNT LL (100/BX 8BX/CA)

## (undated) DEVICE — TUBE E-T HI-LO CUFF 7.0MM (10EA/PK)

## (undated) DEVICE — SET EXTENSION WITH 2 PORTS (48EA/CA) ***PART #2C8610 IS A SUBSTITUTE*****

## (undated) DEVICE — BOVIE BLADE COATED - (50/PK)

## (undated) DEVICE — SLEEVE, VASO, THIGH, MED

## (undated) DEVICE — BLADE SURGICAL CLIPPER - (50EA/CA)

## (undated) DEVICE — BOVIE  BLADE 6 EXTENDED - (50/PK)

## (undated) DEVICE — ELECTRODE DUAL RETURN W/ CORD - (50/PK)

## (undated) DEVICE — NEEDLE MULTIFIRE (5EA/BX)

## (undated) DEVICE — CLIP MED LG INTNL HRZN TI ESCP - (20/BX)

## (undated) DEVICE — PAD LAP STERILE 18 X 18 - (5/PK 40PK/CA)

## (undated) DEVICE — HEAD HOLDER JUNIOR/ADULT

## (undated) DEVICE — NEPTUNE 4 PORT MANIFOLD - (20/PK)

## (undated) DEVICE — GOWN WARMING STANDARD FLEX - (30/CA)

## (undated) DEVICE — SYRINGE SAFETY 5 ML 18 GA X 1-1/2 BLUNT LL (100/BX 4BX/CA)

## (undated) DEVICE — BLADE SURGICAL #11 - (50/BX)

## (undated) DEVICE — CANISTER SUCTION 3000ML MECHANICAL FILTER AUTO SHUTOFF MEDI-VAC NONSTERILE LF DISP  (40EA/CA)

## (undated) DEVICE — SURGIFOAM (SIZE 100) - (6EA/CA)

## (undated) DEVICE — TUBE SUCTION YANKAUER  1/4 X 6FT (20EA/CA)"

## (undated) DEVICE — DRAIN PENROSE STERILE 1/4 X - 18 IN  (25EA/BX)

## (undated) DEVICE — STAPLE 60MM WHTE 2.6MM - (12/BX) WAS PART #ECR60W

## (undated) DEVICE — SUTURE 1 PDS II PLUS TP-1 - (12PK/BX)

## (undated) DEVICE — SHAVER4.0 RESECTOR FORMULA - (5EA/BX)

## (undated) DEVICE — SPONGE GAUZESTER 4 X 4 4PLY - (128PK/CA)

## (undated) DEVICE — HANDPIECE THUNDERBEAT 5MM 20CM FRONT GRIP TYPE S (5EA/BX)

## (undated) DEVICE — SODIUM CHL. INJ. 0.9% 500ML (24EA/CA 50CA/PF)

## (undated) DEVICE — SUTURE 3-0 SILK SH (12PK/BX)

## (undated) DEVICE — SUTURE 4-0 MONOCRYL PLUS PS-2 - 27 INCH (36/BX)

## (undated) DEVICE — PROBE VULCAN 90 DEG W/SUCTION

## (undated) DEVICE — DRESSING LEUKOMED STERILE 11.75X4IN - (50/CA)

## (undated) DEVICE — DRAPESURG STERI-DRAPE LONG - (10/BX 4BX/CA)

## (undated) DEVICE — DRESSING NON-ADHERING 8 X 3 - (50/BX)

## (undated) DEVICE — CLOSURE SKIN STRIP 1/2 X 4 IN - (STERI STRIP) (50/BX 4BX/CA)

## (undated) DEVICE — SUTURE GENERAL

## (undated) DEVICE — PACK MINOR BASIN - (2EA/CA)

## (undated) DEVICE — TRAY SURESTEP FOLEY TEMP SENSING 16FR (10EA/CA) ORDER  #18764 FOR TEMP FOLEY ONLY

## (undated) DEVICE — GLOVE BIOGEL ECLIPSE PF LATEX SIZE 8.0  (50PR/BX)

## (undated) DEVICE — BITE BLOCK ADULT 60FR (100EA/CA)

## (undated) DEVICE — FORCEP RADIAL JAW 4 STANDARD CAPACITY W/NEEDLE 240CM (40EA/BX)

## (undated) DEVICE — SUCTION INSTRUMENT YANKAUER BULBOUS TIP W/O VENT (50EA/CA)

## (undated) DEVICE — CLIP MED INTNL HRZN TI ESCP - (25/BX)

## (undated) DEVICE — TOWELS CLOTH SURGICAL - (4/PK 20PK/CA)

## (undated) DEVICE — HUMID-VENT HEAT AND MOISTURE EXCHANGE- (50/BX)

## (undated) DEVICE — TUBING PATIENT W/CONNECTOR REDEUCE (1EA)

## (undated) DEVICE — DRESSING TRANSPARENT FILM TEGADERM 4 X 4.75" (50EA/BX)"

## (undated) DEVICE — DRAPE LAPAROTOMY T SHEET - (12EA/CA)

## (undated) DEVICE — VESSELOOP MAXI BLUE STERILE- SURG-I-LOOP (10EA/BX)

## (undated) DEVICE — SYRINGE 30 ML LL (56/BX)

## (undated) DEVICE — SYRINGE DISP. 60 CC LL - (30/BX, 12BX/CA)**WHEN THESE ARE GONE ORDER #500206**

## (undated) DEVICE — GOWN SURGICAL X-LARGE ULTRA - FILM-REINFORCED (20/CA)

## (undated) DEVICE — KIT  I.V. START (100EA/CA)

## (undated) DEVICE — SODIUM CHL IRRIGATION 0.9% 1000ML (12EA/CA)

## (undated) DEVICE — PACK MAJOR BASIN - (2EA/CA)

## (undated) DEVICE — MASK, LARYNGEAL AIRWAY #4

## (undated) DEVICE — SOD. CHL. INJ. 0.9% 1000 ML - (14EA/CA 60CA/PF)

## (undated) DEVICE — SUTURE 3-0 PDS II PLUS SH 27 IN (36EA/BX)

## (undated) DEVICE — NEEDLE EUS DELIVERY SYSTEM FNB PRE LOADED 22 GA

## (undated) DEVICE — BAG SPONGE COUNT 10.25 X 32 - BLUE (250/CA)

## (undated) DEVICE — GLOVE BIOGEL PI ORTHO SZ 7.5 PF LF (40PR/BX)

## (undated) DEVICE — SUTURE 4-0 PROLENE RB-1 D/A 36 (36PK/BX)"

## (undated) DEVICE — SUTURE 2-0 PROLENE SH (36PK/BX)

## (undated) DEVICE — DRAPE U ORTHOPEDIC - (10/BX)

## (undated) DEVICE — STAPLER 60MM ENDO SHORT ARTICULATING (3EA/BX)

## (undated) DEVICE — SPONGE XRAY 8X4 STERL. 12PL - (10EA/TY 80TY/CA)

## (undated) DEVICE — SHAVER4.0 AGGRESSIVE + FORMLA (5EA/BX)

## (undated) DEVICE — PACK SHOULDER ARTHROSCOPY SM - (2EA/CA)

## (undated) DEVICE — DRAPE MEDI-SLUSH STERILE  - (40/CA)

## (undated) DEVICE — SUTURE 2-0 SILK SH 30 IN C/R (12PK/BX)

## (undated) DEVICE — SYRINGE 10 ML CONTROL LL (25EA/BX 4BX/CA)

## (undated) DEVICE — SENSOR SPO2 ADULT LNCS ADTX (20/BX) ORDER ITEM #19593

## (undated) DEVICE — TUBE NG SALEM SUMP 16FR (50EA/CA)